# Patient Record
Sex: MALE | Race: WHITE | NOT HISPANIC OR LATINO | Employment: OTHER | URBAN - METROPOLITAN AREA
[De-identification: names, ages, dates, MRNs, and addresses within clinical notes are randomized per-mention and may not be internally consistent; named-entity substitution may affect disease eponyms.]

---

## 2019-11-19 ENCOUNTER — HOSPITAL ENCOUNTER (EMERGENCY)
Facility: HOSPITAL | Age: 64
Discharge: HOME | End: 2019-11-19
Attending: EMERGENCY MEDICINE
Payer: MEDICARE

## 2019-11-19 VITALS
HEART RATE: 77 BPM | RESPIRATION RATE: 18 BRPM | SYSTOLIC BLOOD PRESSURE: 148 MMHG | TEMPERATURE: 98 F | BODY MASS INDEX: 31.81 KG/M2 | WEIGHT: 240 LBS | OXYGEN SATURATION: 94 % | DIASTOLIC BLOOD PRESSURE: 71 MMHG | HEIGHT: 73 IN

## 2019-11-19 DIAGNOSIS — K56.609 SMALL BOWEL OBSTRUCTION: ICD-10-CM

## 2019-11-19 DIAGNOSIS — R10.9 ABDOMINAL PAIN, UNSPECIFIED ABDOMINAL LOCATION: Primary | ICD-10-CM

## 2019-11-19 DIAGNOSIS — B19.20 HEPATITIS C VIRUS INFECTION WITHOUT HEPATIC COMA, UNSPECIFIED CHRONICITY: ICD-10-CM

## 2019-11-19 DIAGNOSIS — K74.60 HEPATIC CIRRHOSIS, UNSPECIFIED HEPATIC CIRRHOSIS TYPE, UNSPECIFIED WHETHER ASCITES PRESENT: ICD-10-CM

## 2019-11-19 LAB
ALBUMIN SERPL BCP-MCNC: 3.8 G/DL (ref 3.5–5.2)
ALP SERPL-CCNC: 84 U/L (ref 55–135)
ALT SERPL W/O P-5'-P-CCNC: 26 U/L (ref 10–44)
ANION GAP SERPL CALC-SCNC: 11 MMOL/L (ref 8–16)
AST SERPL-CCNC: 31 U/L (ref 10–40)
BASOPHILS # BLD AUTO: 0.02 K/UL (ref 0–0.2)
BASOPHILS NFR BLD: 0.3 % (ref 0–1.9)
BILIRUB SERPL-MCNC: 1.5 MG/DL (ref 0.1–1)
BILIRUB UR QL STRIP: NEGATIVE
BNP SERPL-MCNC: 49 PG/ML (ref 0–99)
BUN SERPL-MCNC: 16 MG/DL (ref 8–23)
CALCIUM SERPL-MCNC: 8.6 MG/DL (ref 8.7–10.5)
CHLORIDE SERPL-SCNC: 103 MMOL/L (ref 95–110)
CLARITY UR: CLEAR
CO2 SERPL-SCNC: 26 MMOL/L (ref 23–29)
COLOR UR: YELLOW
CREAT SERPL-MCNC: 1 MG/DL (ref 0.5–1.4)
DIFFERENTIAL METHOD: ABNORMAL
EOSINOPHIL # BLD AUTO: 0.1 K/UL (ref 0–0.5)
EOSINOPHIL NFR BLD: 0.8 % (ref 0–8)
ERYTHROCYTE [DISTWIDTH] IN BLOOD BY AUTOMATED COUNT: 14.2 % (ref 11.5–14.5)
EST. GFR  (AFRICAN AMERICAN): >60 ML/MIN/1.73 M^2
EST. GFR  (NON AFRICAN AMERICAN): >60 ML/MIN/1.73 M^2
GLUCOSE SERPL-MCNC: 117 MG/DL (ref 70–110)
GLUCOSE UR QL STRIP: NEGATIVE
HCT VFR BLD AUTO: 41.9 % (ref 40–54)
HGB BLD-MCNC: 13.8 G/DL (ref 14–18)
HGB UR QL STRIP: ABNORMAL
IMM GRANULOCYTES # BLD AUTO: 0.03 K/UL (ref 0–0.04)
IMM GRANULOCYTES NFR BLD AUTO: 0.4 % (ref 0–0.5)
KETONES UR QL STRIP: NEGATIVE
LEUKOCYTE ESTERASE UR QL STRIP: NEGATIVE
LIPASE SERPL-CCNC: 38 U/L (ref 4–60)
LYMPHOCYTES # BLD AUTO: 0.3 K/UL (ref 1–4.8)
LYMPHOCYTES NFR BLD: 3.2 % (ref 18–48)
MCH RBC QN AUTO: 29.6 PG (ref 27–31)
MCHC RBC AUTO-ENTMCNC: 32.9 G/DL (ref 32–36)
MCV RBC AUTO: 90 FL (ref 82–98)
MONOCYTES # BLD AUTO: 0.4 K/UL (ref 0.3–1)
MONOCYTES NFR BLD: 4.7 % (ref 4–15)
NEUTROPHILS # BLD AUTO: 7.2 K/UL (ref 1.8–7.7)
NEUTROPHILS NFR BLD: 90.6 % (ref 38–73)
NITRITE UR QL STRIP: NEGATIVE
NRBC BLD-RTO: 0 /100 WBC
PH UR STRIP: 6 [PH] (ref 5–8)
PLATELET # BLD AUTO: 112 K/UL (ref 150–350)
PMV BLD AUTO: 11 FL (ref 9.2–12.9)
POTASSIUM SERPL-SCNC: 3.5 MMOL/L (ref 3.5–5.1)
PROT SERPL-MCNC: 7.3 G/DL (ref 6–8.4)
PROT UR QL STRIP: NEGATIVE
RBC # BLD AUTO: 4.67 M/UL (ref 4.6–6.2)
SODIUM SERPL-SCNC: 140 MMOL/L (ref 136–145)
SP GR UR STRIP: >=1.03 (ref 1–1.03)
TROPONIN I SERPL DL<=0.01 NG/ML-MCNC: <0.03 NG/ML (ref 0.02–0.04)
TROPONIN I SERPL DL<=0.01 NG/ML-MCNC: <0.03 NG/ML (ref 0.02–0.04)
URN SPEC COLLECT METH UR: ABNORMAL
UROBILINOGEN UR STRIP-ACNC: NEGATIVE EU/DL
WBC # BLD AUTO: 7.93 K/UL (ref 3.9–12.7)

## 2019-11-19 PROCEDURE — 25500020 PHARM REV CODE 255: Performed by: EMERGENCY MEDICINE

## 2019-11-19 PROCEDURE — 93005 ELECTROCARDIOGRAM TRACING: CPT

## 2019-11-19 PROCEDURE — 84484 ASSAY OF TROPONIN QUANT: CPT | Mod: 91

## 2019-11-19 PROCEDURE — 96361 HYDRATE IV INFUSION ADD-ON: CPT

## 2019-11-19 PROCEDURE — 99285 EMERGENCY DEPT VISIT HI MDM: CPT | Mod: 25

## 2019-11-19 PROCEDURE — 96375 TX/PRO/DX INJ NEW DRUG ADDON: CPT

## 2019-11-19 PROCEDURE — 85025 COMPLETE CBC W/AUTO DIFF WBC: CPT

## 2019-11-19 PROCEDURE — C9113 INJ PANTOPRAZOLE SODIUM, VIA: HCPCS | Performed by: EMERGENCY MEDICINE

## 2019-11-19 PROCEDURE — 96374 THER/PROPH/DIAG INJ IV PUSH: CPT

## 2019-11-19 PROCEDURE — 36415 COLL VENOUS BLD VENIPUNCTURE: CPT

## 2019-11-19 PROCEDURE — 83880 ASSAY OF NATRIURETIC PEPTIDE: CPT

## 2019-11-19 PROCEDURE — 63600175 PHARM REV CODE 636 W HCPCS: Performed by: EMERGENCY MEDICINE

## 2019-11-19 PROCEDURE — 83690 ASSAY OF LIPASE: CPT

## 2019-11-19 PROCEDURE — 80053 COMPREHEN METABOLIC PANEL: CPT

## 2019-11-19 PROCEDURE — 81003 URINALYSIS AUTO W/O SCOPE: CPT

## 2019-11-19 RX ORDER — PANTOPRAZOLE SODIUM 40 MG/10ML
40 INJECTION, POWDER, LYOPHILIZED, FOR SOLUTION INTRAVENOUS
Status: COMPLETED | OUTPATIENT
Start: 2019-11-19 | End: 2019-11-19

## 2019-11-19 RX ORDER — ONDANSETRON 2 MG/ML
4 INJECTION INTRAMUSCULAR; INTRAVENOUS
Status: COMPLETED | OUTPATIENT
Start: 2019-11-19 | End: 2019-11-19

## 2019-11-19 RX ORDER — HYDROMORPHONE HYDROCHLORIDE 1 MG/ML
0.5 INJECTION, SOLUTION INTRAMUSCULAR; INTRAVENOUS; SUBCUTANEOUS
Status: COMPLETED | OUTPATIENT
Start: 2019-11-19 | End: 2019-11-19

## 2019-11-19 RX ORDER — SODIUM CHLORIDE, SODIUM LACTATE, POTASSIUM CHLORIDE, CALCIUM CHLORIDE 600; 310; 30; 20 MG/100ML; MG/100ML; MG/100ML; MG/100ML
1000 INJECTION, SOLUTION INTRAVENOUS
Status: COMPLETED | OUTPATIENT
Start: 2019-11-19 | End: 2019-11-19

## 2019-11-19 RX ADMIN — SODIUM CHLORIDE, SODIUM LACTATE, POTASSIUM CHLORIDE, AND CALCIUM CHLORIDE 1000 ML: .6; .31; .03; .02 INJECTION, SOLUTION INTRAVENOUS at 09:11

## 2019-11-19 RX ADMIN — HYDROMORPHONE HYDROCHLORIDE 0.5 MG: 1 INJECTION, SOLUTION INTRAMUSCULAR; INTRAVENOUS; SUBCUTANEOUS at 09:11

## 2019-11-19 RX ADMIN — IOHEXOL 100 ML: 350 INJECTION, SOLUTION INTRAVENOUS at 10:11

## 2019-11-19 RX ADMIN — PANTOPRAZOLE SODIUM 40 MG: 40 INJECTION, POWDER, LYOPHILIZED, FOR SOLUTION INTRAVENOUS at 09:11

## 2019-11-19 RX ADMIN — ONDANSETRON 4 MG: 2 INJECTION INTRAMUSCULAR; INTRAVENOUS at 09:11

## 2019-11-19 NOTE — ED PROVIDER NOTES
Encounter Date: 11/19/2019       History     Chief Complaint   Patient presents with    Abdominal Pain     ONSET THIS AM @ 3    Vomiting     64-year-old male with history of hepatitis-C cirrhotic liver disease, ileocolonic Crohn's disease diagnosed at the age of 9 s/p multiple abdominal surgeries for SBO, recurring small-bowel obstructions.  never Patient followed by [facility].  Patient presents to the emergency department with complaint of nausea vomiting abdominal pain which began approximately 3 him last night.  Patient states has had numerous episodes of vomiting.  Last emesis was 1 hr prior to arrival.  Patient denied fever, no melena, no diarrhea.  No other constitutional symptoms. Patient states pain is diffuse in nature.  Patient is requesting if he has to be admitted he wishes transferred to [facility] for continuity of care.  States that he cannot drive himself to [facility] secondary to persistent pain and vomiting.        Review of patient's allergies indicates:  No Known Allergies  Past Medical History:   Diagnosis Date    Crohn disease     Hard of hearing     Hepatitis C     Hypertension      Past Surgical History:   Procedure Laterality Date    ABDOMINAL SURGERY      BOWEL RESECTION     No family history on file.  Social History     Tobacco Use    Smoking status: Never Smoker   Substance Use Topics    Alcohol use: Never     Frequency: Never    Drug use: Not on file     Review of Systems   Constitutional: Negative for fever.   HENT: Negative for sore throat.    Respiratory: Negative for shortness of breath.    Cardiovascular: Negative for chest pain.   Gastrointestinal: Positive for abdominal pain, nausea and vomiting. Negative for blood in stool and diarrhea.   Genitourinary: Negative for dysuria.   Musculoskeletal: Negative for back pain.   Skin: Negative for rash.   Neurological: Negative for weakness and light-headedness.   Hematological: Does not bruise/bleed easily.   Psychiatric/Behavioral: The patient  is not nervous/anxious.        Physical Exam     Initial Vitals [11/19/19 0823]   BP Pulse Resp Temp SpO2   (!) 149/76 85 18 98 °F (36.7 °C) 96 %      MAP       --         Physical Exam    Nursing note and vitals reviewed.  Constitutional: He appears well-developed and well-nourished. He appears ill.   HENT:   Head: Normocephalic and atraumatic.   Nose: Nose normal.   Mouth/Throat: Oropharynx is clear and moist.   Eyes: Conjunctivae and EOM are normal. Pupils are equal, round, and reactive to light. No scleral icterus.   Neck: Normal range of motion. Neck supple.   Cardiovascular: Normal rate, regular rhythm, normal heart sounds and intact distal pulses. Exam reveals no gallop and no friction rub.    No murmur heard.  Pulmonary/Chest: Breath sounds normal. No stridor. No respiratory distress.   Abdominal: Soft. Bowel sounds are normal. He exhibits distension. He exhibits no shifting dullness and no mass. There is hepatosplenomegaly. There is generalized tenderness. There is no rebound and no guarding. No hernia.   Musculoskeletal: Normal range of motion. He exhibits no edema.   Lymphadenopathy:     He has no cervical adenopathy.   Neurological: He is alert and oriented to person, place, and time. He has normal strength and normal reflexes. No cranial nerve deficit or sensory deficit. GCS score is 15. GCS eye subscore is 4. GCS verbal subscore is 5. GCS motor subscore is 6.   Skin: Skin is warm and dry. Capillary refill takes less than 2 seconds. No rash noted.   Psychiatric: He has a normal mood and affect. His behavior is normal. Judgment and thought content normal.         ED Course   Procedures  Labs Reviewed   CBC W/ AUTO DIFFERENTIAL   COMPREHENSIVE METABOLIC PANEL   URINALYSIS, REFLEX TO URINE CULTURE   LIPASE   TROPONIN I   B-TYPE NATRIURETIC PEPTIDE          Imaging Results          X-Ray Chest AP Portable (Final result)  Result time 11/19/19 08:45:21    Final result by Kolby Mathias MD (11/19/19  08:45:21)                 Impression:      1. No acute radiographic abnormalities.      Electronically signed by: Kolby Mathias MD  Date:    11/19/2019  Time:    08:45             Narrative:    EXAMINATION:  XR CHEST AP PORTABLE    CLINICAL HISTORY:  Chest pain    COMPARISON:  March 2018    FINDINGS:  Heart size is normal.  The mediastinum is unremarkable.  No infiltrates or effusions are identified.                                 Medical Decision Making:   Initial Assessment:   64-year-old male with history of hepatitis cirrhotic liver disease, Crohn's disease with recurrence palpitations.  Patient presents with nausea vomiting generalized abdominal pain since this morning.  Differential Diagnosis:   Colitis, bowel obstruction, Crohn's exacerbation, enteritis, viral syndrome  Clinical Tests:   Lab Tests: Ordered and Reviewed  Radiological Study: Ordered and Reviewed  Medical Tests: Ordered and Reviewed  ED Management:  Patient evaluated emergency department found to have subsequent small bowel obstruction.  The patient with noticeable treatment in care being done at [facility].  At this time patient was offered transfer to [facility] and had gained acceptance. However patient had a bowel movement in the emergency department status abdominal pain had decreased markedly.  Patient at this time decided not to be transferred or admitted for small-bowel obstruction.  Patient was given risks benefits of signing against medical advice.  Patient was advised to return to the emergency department if increased abdominal pain, vomiting, fever, symptoms persist or worsen.  Patient instructed to follow up with [facility] this week general surgery.  And primary care physician.                                 Clinical Impression:       ICD-10-CM ICD-9-CM   1. Abdominal pain, unspecified abdominal location R10.9 789.00   2. Small bowel obstruction K56.609 560.9   3. Hepatitis C virus infection without hepatic coma, unspecified chronicity B19.20  070.70   4. Hepatic cirrhosis, unspecified hepatic cirrhosis type, unspecified whether ascites present K74.60 571.5                             Arslan Craig MD  11/19/19 5332

## 2019-11-19 NOTE — ED NOTES
Severe abdominal pain reported since 200 am.  +  N/V PTA.  Abdomen obese.  Protruding area to the right of midline incisional type scar.  States old finding.  BS x 4 WNL.  Last BM 0600.  Reports HX of Crohns disease.

## 2021-02-13 ENCOUNTER — HOSPITAL ENCOUNTER (INPATIENT)
Facility: HOSPITAL | Age: 66
LOS: 1 days | Discharge: HOME | DRG: 683 | End: 2021-02-14
Attending: EMERGENCY MEDICINE | Admitting: STUDENT IN AN ORGANIZED HEALTH CARE EDUCATION/TRAINING PROGRAM
Payer: MEDICARE

## 2021-02-13 DIAGNOSIS — N17.0 ACUTE RENAL FAILURE WITH TUBULAR NECROSIS: ICD-10-CM

## 2021-02-13 DIAGNOSIS — E87.1 HYPONATREMIA: ICD-10-CM

## 2021-02-13 DIAGNOSIS — K52.9 GASTROENTERITIS: ICD-10-CM

## 2021-02-13 DIAGNOSIS — R19.7 VOMITING AND DIARRHEA: ICD-10-CM

## 2021-02-13 DIAGNOSIS — E86.0 DEHYDRATION: Primary | ICD-10-CM

## 2021-02-13 DIAGNOSIS — K56.600 PARTIAL INTESTINAL OBSTRUCTION, UNSPECIFIED CAUSE: ICD-10-CM

## 2021-02-13 DIAGNOSIS — E87.6 HYPOKALEMIA: ICD-10-CM

## 2021-02-13 DIAGNOSIS — R11.10 VOMITING AND DIARRHEA: ICD-10-CM

## 2021-02-13 DIAGNOSIS — N17.9 AKI (ACUTE KIDNEY INJURY): ICD-10-CM

## 2021-02-13 PROBLEM — A41.9 SEPSIS: Status: ACTIVE | Noted: 2021-02-13

## 2021-02-13 PROBLEM — K56.609 SMALL BOWEL OBSTRUCTION: Status: ACTIVE | Noted: 2021-02-13

## 2021-02-13 LAB
ALBUMIN SERPL BCP-MCNC: 4.2 G/DL (ref 3.5–5.2)
ALP SERPL-CCNC: 99 U/L (ref 55–135)
ALT SERPL W/O P-5'-P-CCNC: 21 U/L (ref 10–44)
ANION GAP SERPL CALC-SCNC: 22 MMOL/L (ref 8–16)
AST SERPL-CCNC: 26 U/L (ref 10–40)
BASOPHILS # BLD AUTO: 0.08 K/UL (ref 0–0.2)
BASOPHILS NFR BLD: 0.6 % (ref 0–1.9)
BILIRUB SERPL-MCNC: 2.7 MG/DL (ref 0.1–1)
BUN SERPL-MCNC: 19 MG/DL (ref 8–23)
CALCIUM SERPL-MCNC: 8.9 MG/DL (ref 8.7–10.5)
CHLORIDE SERPL-SCNC: 84 MMOL/L (ref 95–110)
CO2 SERPL-SCNC: 28 MMOL/L (ref 23–29)
CREAT SERPL-MCNC: 2.8 MG/DL (ref 0.5–1.4)
DIFFERENTIAL METHOD: ABNORMAL
EOSINOPHIL # BLD AUTO: 0 K/UL (ref 0–0.5)
EOSINOPHIL NFR BLD: 0.2 % (ref 0–8)
ERYTHROCYTE [DISTWIDTH] IN BLOOD BY AUTOMATED COUNT: 14.3 % (ref 11.5–14.5)
EST. GFR  (AFRICAN AMERICAN): 25.8 ML/MIN/1.73 M^2
EST. GFR  (NON AFRICAN AMERICAN): 22.3 ML/MIN/1.73 M^2
GLUCOSE SERPL-MCNC: 138 MG/DL (ref 70–110)
HCT VFR BLD AUTO: 46.2 % (ref 40–54)
HGB BLD-MCNC: 15.8 G/DL (ref 14–18)
IMM GRANULOCYTES # BLD AUTO: 0.06 K/UL (ref 0–0.04)
IMM GRANULOCYTES NFR BLD AUTO: 0.5 % (ref 0–0.5)
LACTATE SERPL-SCNC: 3.2 MMOL/L (ref 0.5–1.9)
LIPASE SERPL-CCNC: 21 U/L (ref 4–60)
LYMPHOCYTES # BLD AUTO: 0.9 K/UL (ref 1–4.8)
LYMPHOCYTES NFR BLD: 7.3 % (ref 18–48)
MAGNESIUM SERPL-MCNC: 1 MG/DL (ref 1.6–2.6)
MCH RBC QN AUTO: 28.8 PG (ref 27–31)
MCHC RBC AUTO-ENTMCNC: 34.2 G/DL (ref 32–36)
MCV RBC AUTO: 84 FL (ref 82–98)
MONOCYTES # BLD AUTO: 0.7 K/UL (ref 0.3–1)
MONOCYTES NFR BLD: 5.7 % (ref 4–15)
NEUTROPHILS # BLD AUTO: 11 K/UL (ref 1.8–7.7)
NEUTROPHILS NFR BLD: 85.7 % (ref 38–73)
NRBC BLD-RTO: 0 /100 WBC
OB PNL STL: POSITIVE
PLATELET # BLD AUTO: 345 K/UL (ref 150–350)
PMV BLD AUTO: 11.6 FL (ref 9.2–12.9)
POTASSIUM SERPL-SCNC: 2.5 MMOL/L (ref 3.5–5.1)
PROT SERPL-MCNC: 9 G/DL (ref 6–8.4)
RBC # BLD AUTO: 5.49 M/UL (ref 4.6–6.2)
SARS-COV-2 RDRP RESP QL NAA+PROBE: NEGATIVE
SODIUM SERPL-SCNC: 134 MMOL/L (ref 136–145)
WBC # BLD AUTO: 12.81 K/UL (ref 3.9–12.7)
WBC #/AREA STL HPF: NORMAL /[HPF]

## 2021-02-13 PROCEDURE — 12000002 HC ACUTE/MED SURGE SEMI-PRIVATE ROOM

## 2021-02-13 PROCEDURE — 25000003 PHARM REV CODE 250: Performed by: EMERGENCY MEDICINE

## 2021-02-13 PROCEDURE — 87045 FECES CULTURE AEROBIC BACT: CPT

## 2021-02-13 PROCEDURE — 85025 COMPLETE CBC W/AUTO DIFF WBC: CPT

## 2021-02-13 PROCEDURE — 89055 LEUKOCYTE ASSESSMENT FECAL: CPT

## 2021-02-13 PROCEDURE — C9113 INJ PANTOPRAZOLE SODIUM, VIA: HCPCS | Performed by: EMERGENCY MEDICINE

## 2021-02-13 PROCEDURE — U0002 COVID-19 LAB TEST NON-CDC: HCPCS

## 2021-02-13 PROCEDURE — 96360 HYDRATION IV INFUSION INIT: CPT

## 2021-02-13 PROCEDURE — S0030 INJECTION, METRONIDAZOLE: HCPCS | Performed by: EMERGENCY MEDICINE

## 2021-02-13 PROCEDURE — 82272 OCCULT BLD FECES 1-3 TESTS: CPT

## 2021-02-13 PROCEDURE — 87209 SMEAR COMPLEX STAIN: CPT

## 2021-02-13 PROCEDURE — 80053 COMPREHEN METABOLIC PANEL: CPT

## 2021-02-13 PROCEDURE — 87040 BLOOD CULTURE FOR BACTERIA: CPT

## 2021-02-13 PROCEDURE — 99285 EMERGENCY DEPT VISIT HI MDM: CPT | Mod: 25

## 2021-02-13 PROCEDURE — 83735 ASSAY OF MAGNESIUM: CPT

## 2021-02-13 PROCEDURE — 83605 ASSAY OF LACTIC ACID: CPT

## 2021-02-13 PROCEDURE — 87015 SPECIMEN INFECT AGNT CONCNTJ: CPT

## 2021-02-13 PROCEDURE — 63600175 PHARM REV CODE 636 W HCPCS: Performed by: NURSE PRACTITIONER

## 2021-02-13 PROCEDURE — 87046 STOOL CULTR AEROBIC BACT EA: CPT | Mod: 59

## 2021-02-13 PROCEDURE — 36415 COLL VENOUS BLD VENIPUNCTURE: CPT

## 2021-02-13 PROCEDURE — 96374 THER/PROPH/DIAG INJ IV PUSH: CPT

## 2021-02-13 PROCEDURE — 63600175 PHARM REV CODE 636 W HCPCS: Performed by: EMERGENCY MEDICINE

## 2021-02-13 PROCEDURE — 96375 TX/PRO/DX INJ NEW DRUG ADDON: CPT

## 2021-02-13 PROCEDURE — 83690 ASSAY OF LIPASE: CPT

## 2021-02-13 RX ORDER — MAGNESIUM SULFATE HEPTAHYDRATE 40 MG/ML
2 INJECTION, SOLUTION INTRAVENOUS
Status: DISCONTINUED | OUTPATIENT
Start: 2021-02-13 | End: 2021-02-15 | Stop reason: HOSPADM

## 2021-02-13 RX ORDER — POTASSIUM CHLORIDE 20 MEQ/1
20 TABLET, EXTENDED RELEASE ORAL
Status: DISCONTINUED | OUTPATIENT
Start: 2021-02-13 | End: 2021-02-15 | Stop reason: HOSPADM

## 2021-02-13 RX ORDER — ERGOCALCIFEROL 1.25 MG/1
50000 CAPSULE ORAL
COMMUNITY

## 2021-02-13 RX ORDER — CALCIUM CHLORIDE IN 0.9 % NACL 1 G/100 ML
1 INTRAVENOUS SOLUTION, PIGGYBACK (ML) INTRAVENOUS
Status: DISCONTINUED | OUTPATIENT
Start: 2021-02-13 | End: 2021-02-15 | Stop reason: HOSPADM

## 2021-02-13 RX ORDER — AMLODIPINE BESYLATE 5 MG/1
10 TABLET ORAL DAILY
Status: DISCONTINUED | OUTPATIENT
Start: 2021-02-14 | End: 2021-02-15 | Stop reason: HOSPADM

## 2021-02-13 RX ORDER — POTASSIUM CHLORIDE 7.45 MG/ML
40 INJECTION INTRAVENOUS
Status: DISCONTINUED | OUTPATIENT
Start: 2021-02-13 | End: 2021-02-15 | Stop reason: HOSPADM

## 2021-02-13 RX ORDER — MAGNESIUM SULFATE 1 G/100ML
1 INJECTION INTRAVENOUS
Status: DISCONTINUED | OUTPATIENT
Start: 2021-02-13 | End: 2021-02-15 | Stop reason: HOSPADM

## 2021-02-13 RX ORDER — ONDANSETRON 2 MG/ML
4 INJECTION INTRAMUSCULAR; INTRAVENOUS
Status: COMPLETED | OUTPATIENT
Start: 2021-02-13 | End: 2021-02-13

## 2021-02-13 RX ORDER — ONDANSETRON 2 MG/ML
4 INJECTION INTRAMUSCULAR; INTRAVENOUS EVERY 8 HOURS PRN
Status: DISCONTINUED | OUTPATIENT
Start: 2021-02-13 | End: 2021-02-15 | Stop reason: HOSPADM

## 2021-02-13 RX ORDER — SODIUM CHLORIDE AND POTASSIUM CHLORIDE 150; 900 MG/100ML; MG/100ML
INJECTION, SOLUTION INTRAVENOUS
Status: COMPLETED | OUTPATIENT
Start: 2021-02-13 | End: 2021-02-13

## 2021-02-13 RX ORDER — PANTOPRAZOLE SODIUM 40 MG/10ML
40 INJECTION, POWDER, LYOPHILIZED, FOR SOLUTION INTRAVENOUS
Status: COMPLETED | OUTPATIENT
Start: 2021-02-13 | End: 2021-02-13

## 2021-02-13 RX ORDER — ACETAMINOPHEN 325 MG/1
650 TABLET ORAL EVERY 4 HOURS PRN
Status: DISCONTINUED | OUTPATIENT
Start: 2021-02-13 | End: 2021-02-15 | Stop reason: HOSPADM

## 2021-02-13 RX ORDER — LEVOFLOXACIN 5 MG/ML
500 INJECTION, SOLUTION INTRAVENOUS
Status: DISCONTINUED | OUTPATIENT
Start: 2021-02-14 | End: 2021-02-15 | Stop reason: HOSPADM

## 2021-02-13 RX ORDER — AMLODIPINE BESYLATE 10 MG/1
10 TABLET ORAL DAILY
COMMUNITY

## 2021-02-13 RX ORDER — MAGNESIUM SULFATE HEPTAHYDRATE 40 MG/ML
2 INJECTION, SOLUTION INTRAVENOUS ONCE
Status: COMPLETED | OUTPATIENT
Start: 2021-02-14 | End: 2021-02-14

## 2021-02-13 RX ORDER — LANOLIN ALCOHOL/MO/W.PET/CERES
800 CREAM (GRAM) TOPICAL
Status: DISCONTINUED | OUTPATIENT
Start: 2021-02-13 | End: 2021-02-15 | Stop reason: HOSPADM

## 2021-02-13 RX ORDER — MAGNESIUM SULFATE HEPTAHYDRATE 40 MG/ML
4 INJECTION, SOLUTION INTRAVENOUS
Status: DISCONTINUED | OUTPATIENT
Start: 2021-02-13 | End: 2021-02-15 | Stop reason: HOSPADM

## 2021-02-13 RX ORDER — MORPHINE SULFATE 4 MG/ML
4 INJECTION, SOLUTION INTRAMUSCULAR; INTRAVENOUS
Status: COMPLETED | OUTPATIENT
Start: 2021-02-13 | End: 2021-02-13

## 2021-02-13 RX ORDER — SODIUM CHLORIDE 0.9 % (FLUSH) 0.9 %
10 SYRINGE (ML) INJECTION
Status: DISCONTINUED | OUTPATIENT
Start: 2021-02-13 | End: 2021-02-15 | Stop reason: HOSPADM

## 2021-02-13 RX ORDER — POTASSIUM CHLORIDE 20 MEQ/1
40 TABLET, EXTENDED RELEASE ORAL
Status: DISCONTINUED | OUTPATIENT
Start: 2021-02-13 | End: 2021-02-15 | Stop reason: HOSPADM

## 2021-02-13 RX ORDER — POTASSIUM CHLORIDE 7.45 MG/ML
20 INJECTION INTRAVENOUS
Status: DISCONTINUED | OUTPATIENT
Start: 2021-02-13 | End: 2021-02-15 | Stop reason: HOSPADM

## 2021-02-13 RX ORDER — ACETAMINOPHEN 325 MG/1
650 TABLET ORAL EVERY 8 HOURS PRN
Status: DISCONTINUED | OUTPATIENT
Start: 2021-02-13 | End: 2021-02-15 | Stop reason: HOSPADM

## 2021-02-13 RX ORDER — SODIUM CHLORIDE AND POTASSIUM CHLORIDE 150; 900 MG/100ML; MG/100ML
INJECTION, SOLUTION INTRAVENOUS CONTINUOUS
Status: DISCONTINUED | OUTPATIENT
Start: 2021-02-14 | End: 2021-02-14 | Stop reason: HOSPADM

## 2021-02-13 RX ORDER — LEVOFLOXACIN 5 MG/ML
500 INJECTION, SOLUTION INTRAVENOUS
Status: COMPLETED | OUTPATIENT
Start: 2021-02-13 | End: 2021-02-13

## 2021-02-13 RX ORDER — METRONIDAZOLE 500 MG/100ML
500 INJECTION, SOLUTION INTRAVENOUS
Status: DISCONTINUED | OUTPATIENT
Start: 2021-02-15 | End: 2021-02-15 | Stop reason: HOSPADM

## 2021-02-13 RX ORDER — METRONIDAZOLE 500 MG/100ML
500 INJECTION, SOLUTION INTRAVENOUS
Status: COMPLETED | OUTPATIENT
Start: 2021-02-13 | End: 2021-02-13

## 2021-02-13 RX ADMIN — LEVOFLOXACIN 500 MG: 500 INJECTION, SOLUTION INTRAVENOUS at 10:02

## 2021-02-13 RX ADMIN — PANTOPRAZOLE SODIUM 40 MG: 40 INJECTION, POWDER, FOR SOLUTION INTRAVENOUS at 08:02

## 2021-02-13 RX ADMIN — SODIUM CHLORIDE AND POTASSIUM CHLORIDE: .9; .15 SOLUTION INTRAVENOUS at 09:02

## 2021-02-13 RX ADMIN — SODIUM CHLORIDE, SODIUM LACTATE, POTASSIUM CHLORIDE, AND CALCIUM CHLORIDE 1000 ML: .6; .31; .03; .02 INJECTION, SOLUTION INTRAVENOUS at 08:02

## 2021-02-13 RX ADMIN — SODIUM CHLORIDE 1000 ML: 0.9 INJECTION, SOLUTION INTRAVENOUS at 09:02

## 2021-02-13 RX ADMIN — ONDANSETRON 4 MG: 2 INJECTION INTRAMUSCULAR; INTRAVENOUS at 08:02

## 2021-02-13 RX ADMIN — MORPHINE SULFATE 4 MG: 4 INJECTION INTRAVENOUS at 08:02

## 2021-02-13 RX ADMIN — METRONIDAZOLE 500 MG: 500 INJECTION, SOLUTION INTRAVENOUS at 09:02

## 2021-02-13 RX ADMIN — SODIUM CHLORIDE AND POTASSIUM CHLORIDE: .9; .15 SOLUTION INTRAVENOUS at 11:02

## 2021-02-14 VITALS
DIASTOLIC BLOOD PRESSURE: 77 MMHG | OXYGEN SATURATION: 94 % | HEIGHT: 73 IN | TEMPERATURE: 97 F | RESPIRATION RATE: 18 BRPM | HEART RATE: 74 BPM | BODY MASS INDEX: 31.14 KG/M2 | SYSTOLIC BLOOD PRESSURE: 137 MMHG | WEIGHT: 235 LBS

## 2021-02-14 PROBLEM — K56.609 SMALL BOWEL OBSTRUCTION: Status: RESOLVED | Noted: 2021-02-13 | Resolved: 2021-02-14

## 2021-02-14 PROBLEM — K52.9 GASTROENTERITIS: Status: RESOLVED | Noted: 2021-02-13 | Resolved: 2021-02-14

## 2021-02-14 PROBLEM — A41.9 SEPSIS: Status: RESOLVED | Noted: 2021-02-13 | Resolved: 2021-02-14

## 2021-02-14 PROBLEM — N17.9 AKI (ACUTE KIDNEY INJURY): Status: RESOLVED | Noted: 2021-02-13 | Resolved: 2021-02-14

## 2021-02-14 LAB
ANION GAP SERPL CALC-SCNC: 14 MMOL/L (ref 8–16)
ANION GAP SERPL CALC-SCNC: 15 MMOL/L (ref 8–16)
BASOPHILS # BLD AUTO: 0.02 K/UL (ref 0–0.2)
BASOPHILS NFR BLD: 0.3 % (ref 0–1.9)
BUN SERPL-MCNC: 24 MG/DL (ref 8–23)
BUN SERPL-MCNC: 25 MG/DL (ref 8–23)
CALCIUM SERPL-MCNC: 7.4 MG/DL (ref 8.7–10.5)
CALCIUM SERPL-MCNC: 7.5 MG/DL (ref 8.7–10.5)
CHLORIDE SERPL-SCNC: 93 MMOL/L (ref 95–110)
CHLORIDE SERPL-SCNC: 93 MMOL/L (ref 95–110)
CO2 SERPL-SCNC: 26 MMOL/L (ref 23–29)
CO2 SERPL-SCNC: 28 MMOL/L (ref 23–29)
CREAT SERPL-MCNC: 1.7 MG/DL (ref 0.5–1.4)
CREAT SERPL-MCNC: 2.2 MG/DL (ref 0.5–1.4)
DIFFERENTIAL METHOD: ABNORMAL
EOSINOPHIL # BLD AUTO: 0 K/UL (ref 0–0.5)
EOSINOPHIL NFR BLD: 0 % (ref 0–8)
ERYTHROCYTE [DISTWIDTH] IN BLOOD BY AUTOMATED COUNT: 14.2 % (ref 11.5–14.5)
EST. GFR  (AFRICAN AMERICAN): 34.6 ML/MIN/1.73 M^2
EST. GFR  (AFRICAN AMERICAN): 47.2 ML/MIN/1.73 M^2
EST. GFR  (NON AFRICAN AMERICAN): 29.9 ML/MIN/1.73 M^2
EST. GFR  (NON AFRICAN AMERICAN): 40.8 ML/MIN/1.73 M^2
GLUCOSE SERPL-MCNC: 86 MG/DL (ref 70–110)
GLUCOSE SERPL-MCNC: 93 MG/DL (ref 70–110)
HCT VFR BLD AUTO: 35.8 % (ref 40–54)
HGB BLD-MCNC: 11.7 G/DL (ref 14–18)
IMM GRANULOCYTES # BLD AUTO: 0.02 K/UL (ref 0–0.04)
IMM GRANULOCYTES NFR BLD AUTO: 0.3 % (ref 0–0.5)
LACTATE SERPL-SCNC: 1.2 MMOL/L (ref 0.5–1.9)
LYMPHOCYTES # BLD AUTO: 0.6 K/UL (ref 1–4.8)
LYMPHOCYTES NFR BLD: 10 % (ref 18–48)
MAGNESIUM SERPL-MCNC: 0.8 MG/DL (ref 1.6–2.6)
MAGNESIUM SERPL-MCNC: 1.4 MG/DL (ref 1.6–2.6)
MCH RBC QN AUTO: 28.1 PG (ref 27–31)
MCHC RBC AUTO-ENTMCNC: 32.7 G/DL (ref 32–36)
MCV RBC AUTO: 86 FL (ref 82–98)
MONOCYTES # BLD AUTO: 0.5 K/UL (ref 0.3–1)
MONOCYTES NFR BLD: 8.3 % (ref 4–15)
NEUTROPHILS # BLD AUTO: 4.9 K/UL (ref 1.8–7.7)
NEUTROPHILS NFR BLD: 81.1 % (ref 38–73)
NRBC BLD-RTO: 0 /100 WBC
PLATELET # BLD AUTO: 161 K/UL (ref 150–350)
PMV BLD AUTO: 11.4 FL (ref 9.2–12.9)
POTASSIUM SERPL-SCNC: 3 MMOL/L (ref 3.5–5.1)
POTASSIUM SERPL-SCNC: 3.3 MMOL/L (ref 3.5–5.1)
RBC # BLD AUTO: 4.16 M/UL (ref 4.6–6.2)
SODIUM SERPL-SCNC: 133 MMOL/L (ref 136–145)
SODIUM SERPL-SCNC: 136 MMOL/L (ref 136–145)
WBC # BLD AUTO: 6.03 K/UL (ref 3.9–12.7)

## 2021-02-14 PROCEDURE — 63600175 PHARM REV CODE 636 W HCPCS: Performed by: NURSE PRACTITIONER

## 2021-02-14 PROCEDURE — 12000002 HC ACUTE/MED SURGE SEMI-PRIVATE ROOM

## 2021-02-14 PROCEDURE — 36415 COLL VENOUS BLD VENIPUNCTURE: CPT

## 2021-02-14 PROCEDURE — 99223 1ST HOSP IP/OBS HIGH 75: CPT | Mod: ,,, | Performed by: STUDENT IN AN ORGANIZED HEALTH CARE EDUCATION/TRAINING PROGRAM

## 2021-02-14 PROCEDURE — 83735 ASSAY OF MAGNESIUM: CPT

## 2021-02-14 PROCEDURE — 83735 ASSAY OF MAGNESIUM: CPT | Mod: 91

## 2021-02-14 PROCEDURE — 63600175 PHARM REV CODE 636 W HCPCS: Performed by: INTERNAL MEDICINE

## 2021-02-14 PROCEDURE — 99223 PR INITIAL HOSPITAL CARE,LEVL III: ICD-10-PCS | Mod: ,,, | Performed by: STUDENT IN AN ORGANIZED HEALTH CARE EDUCATION/TRAINING PROGRAM

## 2021-02-14 PROCEDURE — 80048 BASIC METABOLIC PNL TOTAL CA: CPT | Mod: 91

## 2021-02-14 PROCEDURE — 85025 COMPLETE CBC W/AUTO DIFF WBC: CPT

## 2021-02-14 PROCEDURE — 80048 BASIC METABOLIC PNL TOTAL CA: CPT

## 2021-02-14 PROCEDURE — 83605 ASSAY OF LACTIC ACID: CPT

## 2021-02-14 PROCEDURE — 25000003 PHARM REV CODE 250: Performed by: INTERNAL MEDICINE

## 2021-02-14 RX ORDER — MAGNESIUM SULFATE HEPTAHYDRATE 40 MG/ML
2 INJECTION, SOLUTION INTRAVENOUS ONCE
Status: COMPLETED | OUTPATIENT
Start: 2021-02-14 | End: 2021-02-14

## 2021-02-14 RX ORDER — TRAMADOL HYDROCHLORIDE 50 MG/1
50 TABLET ORAL EVERY 6 HOURS PRN
Status: DISCONTINUED | OUTPATIENT
Start: 2021-02-14 | End: 2021-02-15 | Stop reason: HOSPADM

## 2021-02-14 RX ORDER — POTASSIUM CHLORIDE 20 MEQ/1
40 TABLET, EXTENDED RELEASE ORAL
Status: COMPLETED | OUTPATIENT
Start: 2021-02-14 | End: 2021-02-14

## 2021-02-14 RX ORDER — DICYCLOMINE HYDROCHLORIDE 10 MG/1
10 CAPSULE ORAL 4 TIMES DAILY PRN
Status: DISCONTINUED | OUTPATIENT
Start: 2021-02-14 | End: 2021-02-15 | Stop reason: HOSPADM

## 2021-02-14 RX ORDER — POTASSIUM CHLORIDE 20 MEQ/1
20 TABLET, EXTENDED RELEASE ORAL 2 TIMES DAILY
Qty: 14 TABLET | Refills: 0 | Status: SHIPPED | OUTPATIENT
Start: 2021-02-14

## 2021-02-14 RX ORDER — LANOLIN ALCOHOL/MO/W.PET/CERES
400 CREAM (GRAM) TOPICAL ONCE
Status: COMPLETED | OUTPATIENT
Start: 2021-02-14 | End: 2021-02-14

## 2021-02-14 RX ORDER — LANOLIN ALCOHOL/MO/W.PET/CERES
400 CREAM (GRAM) TOPICAL DAILY
Qty: 7 TABLET | Refills: 0 | Status: SHIPPED | OUTPATIENT
Start: 2021-02-14

## 2021-02-14 RX ORDER — PROMETHAZINE HYDROCHLORIDE 25 MG/1
25 TABLET ORAL EVERY 6 HOURS PRN
Qty: 30 TABLET | Refills: 1 | Status: SHIPPED | OUTPATIENT
Start: 2021-02-14

## 2021-02-14 RX ADMIN — POTASSIUM CHLORIDE 40 MEQ: 20 TABLET, EXTENDED RELEASE ORAL at 04:02

## 2021-02-14 RX ADMIN — POTASSIUM CHLORIDE 40 MEQ: 7.46 INJECTION, SOLUTION INTRAVENOUS at 09:02

## 2021-02-14 RX ADMIN — SODIUM CHLORIDE AND POTASSIUM CHLORIDE: .9; .15 SOLUTION INTRAVENOUS at 02:02

## 2021-02-14 RX ADMIN — DICYCLOMINE HYDROCHLORIDE 10 MG: 10 CAPSULE ORAL at 12:02

## 2021-02-14 RX ADMIN — MAGNESIUM SULFATE 2 G: 2 INJECTION INTRAVENOUS at 02:02

## 2021-02-14 RX ADMIN — POTASSIUM CHLORIDE 40 MEQ: 20 TABLET, EXTENDED RELEASE ORAL at 03:02

## 2021-02-14 RX ADMIN — MAGNESIUM OXIDE 400 MG: 400 TABLET ORAL at 03:02

## 2021-02-14 RX ADMIN — MAGNESIUM SULFATE 2 G: 2 INJECTION INTRAVENOUS at 03:02

## 2021-02-15 LAB
STOOL CULTURE: NORMAL
STOOL CULTURE: NORMAL

## 2021-02-18 LAB
BACTERIA BLD CULT: NORMAL
BACTERIA BLD CULT: NORMAL
O+P STL TRI STN: NORMAL

## 2025-01-17 ENCOUNTER — HOSPITAL ENCOUNTER (INPATIENT)
Facility: HOSPITAL | Age: 70
LOS: 2 days | Discharge: HOME | DRG: 389 | End: 2025-01-19
Attending: STUDENT IN AN ORGANIZED HEALTH CARE EDUCATION/TRAINING PROGRAM
Payer: MEDICARE

## 2025-01-17 DIAGNOSIS — R10.9 ABDOMINAL PAIN, UNSPECIFIED ABDOMINAL LOCATION: Primary | ICD-10-CM

## 2025-01-17 DIAGNOSIS — R07.9 CHEST PAIN: ICD-10-CM

## 2025-01-17 DIAGNOSIS — N30.90 CYSTITIS: ICD-10-CM

## 2025-01-17 DIAGNOSIS — R91.1 PULMONARY NODULE: ICD-10-CM

## 2025-01-17 DIAGNOSIS — K50.919 CROHN'S DISEASE WITH COMPLICATION, UNSPECIFIED GASTROINTESTINAL TRACT LOCATION: ICD-10-CM

## 2025-01-17 DIAGNOSIS — K56.600 PARTIAL SMALL BOWEL OBSTRUCTION: ICD-10-CM

## 2025-01-17 LAB
ALBUMIN SERPL BCP-MCNC: 3.3 G/DL (ref 3.5–5.2)
ALP SERPL-CCNC: 82 U/L (ref 55–135)
ALT SERPL W/O P-5'-P-CCNC: 12 U/L (ref 10–44)
ANION GAP SERPL CALC-SCNC: 5 MMOL/L (ref 8–16)
AST SERPL-CCNC: 24 U/L (ref 10–40)
BACTERIA #/AREA URNS HPF: ABNORMAL /HPF
BASOPHILS # BLD AUTO: 0.01 K/UL (ref 0–0.2)
BASOPHILS NFR BLD: 0.2 % (ref 0–1.9)
BILIRUB SERPL-MCNC: 1.4 MG/DL (ref 0.1–1)
BILIRUB UR QL STRIP: ABNORMAL
BNP SERPL-MCNC: 80 PG/ML (ref 0–99)
BUN SERPL-MCNC: 10 MG/DL (ref 8–23)
CALCIUM SERPL-MCNC: 8.3 MG/DL (ref 8.7–10.5)
CHLORIDE SERPL-SCNC: 104 MMOL/L (ref 95–110)
CLARITY UR: ABNORMAL
CO2 SERPL-SCNC: 30 MMOL/L (ref 23–29)
COLOR UR: YELLOW
CREAT SERPL-MCNC: 0.9 MG/DL (ref 0.5–1.4)
CRP SERPL-MCNC: 0.8 MG/DL
DIFFERENTIAL METHOD BLD: ABNORMAL
EOSINOPHIL # BLD AUTO: 0.1 K/UL (ref 0–0.5)
EOSINOPHIL NFR BLD: 1.3 % (ref 0–8)
ERYTHROCYTE [DISTWIDTH] IN BLOOD BY AUTOMATED COUNT: 15 % (ref 11.5–14.5)
EST. GFR  (NO RACE VARIABLE): >60 ML/MIN/1.73 M^2
GLUCOSE SERPL-MCNC: 102 MG/DL (ref 70–110)
GLUCOSE UR QL STRIP: NEGATIVE
HCT VFR BLD AUTO: 32 % (ref 40–54)
HGB BLD-MCNC: 10 G/DL (ref 14–18)
HGB UR QL STRIP: ABNORMAL
IMM GRANULOCYTES # BLD AUTO: 0.01 K/UL (ref 0–0.04)
IMM GRANULOCYTES NFR BLD AUTO: 0.2 % (ref 0–0.5)
INR PPP: 1.1 (ref 0.8–1.2)
KETONES UR QL STRIP: NEGATIVE
LEUKOCYTE ESTERASE UR QL STRIP: ABNORMAL
LIPASE SERPL-CCNC: 40 U/L (ref 4–60)
LYMPHOCYTES # BLD AUTO: 0.2 K/UL (ref 1–4.8)
LYMPHOCYTES NFR BLD: 3.9 % (ref 18–48)
MCH RBC QN AUTO: 27.5 PG (ref 27–31)
MCHC RBC AUTO-ENTMCNC: 31.3 G/DL (ref 32–36)
MCV RBC AUTO: 88 FL (ref 82–98)
MICROSCOPIC COMMENT: ABNORMAL
MONOCYTES # BLD AUTO: 0.3 K/UL (ref 0.3–1)
MONOCYTES NFR BLD: 7.1 % (ref 4–15)
NEUTROPHILS # BLD AUTO: 4.1 K/UL (ref 1.8–7.7)
NEUTROPHILS NFR BLD: 87.3 % (ref 38–73)
NITRITE UR QL STRIP: POSITIVE
NRBC BLD-RTO: 0 /100 WBC
PH UR STRIP: 7 [PH] (ref 5–8)
PLATELET # BLD AUTO: 119 K/UL (ref 150–450)
PMV BLD AUTO: 12.4 FL (ref 9.2–12.9)
POTASSIUM SERPL-SCNC: 3.8 MMOL/L (ref 3.5–5.1)
PROT SERPL-MCNC: 6.4 G/DL (ref 6–8.4)
PROT UR QL STRIP: ABNORMAL
PROTHROMBIN TIME: 12.7 SEC (ref 9–12.5)
RBC # BLD AUTO: 3.64 M/UL (ref 4.6–6.2)
RBC #/AREA URNS HPF: 9 /HPF (ref 0–4)
SODIUM SERPL-SCNC: 139 MMOL/L (ref 136–145)
SP GR UR STRIP: 1.02 (ref 1–1.03)
TROPONIN I SERPL HS-MCNC: 12.9 PG/ML (ref 0–14.9)
UNIDENT CRYS URNS QL MICRO: 2
URN SPEC COLLECT METH UR: ABNORMAL
UROBILINOGEN UR STRIP-ACNC: ABNORMAL EU/DL
WBC # BLD AUTO: 4.65 K/UL (ref 3.9–12.7)
WBC #/AREA URNS HPF: >100 /HPF (ref 0–5)

## 2025-01-17 PROCEDURE — 25000003 PHARM REV CODE 250

## 2025-01-17 PROCEDURE — 86140 C-REACTIVE PROTEIN: CPT

## 2025-01-17 PROCEDURE — 83690 ASSAY OF LIPASE: CPT

## 2025-01-17 PROCEDURE — 63600175 PHARM REV CODE 636 W HCPCS

## 2025-01-17 PROCEDURE — 83880 ASSAY OF NATRIURETIC PEPTIDE: CPT

## 2025-01-17 PROCEDURE — 99285 EMERGENCY DEPT VISIT HI MDM: CPT | Mod: 25

## 2025-01-17 PROCEDURE — 85610 PROTHROMBIN TIME: CPT | Performed by: STUDENT IN AN ORGANIZED HEALTH CARE EDUCATION/TRAINING PROGRAM

## 2025-01-17 PROCEDURE — 96375 TX/PRO/DX INJ NEW DRUG ADDON: CPT

## 2025-01-17 PROCEDURE — 87086 URINE CULTURE/COLONY COUNT: CPT

## 2025-01-17 PROCEDURE — 12000002 HC ACUTE/MED SURGE SEMI-PRIVATE ROOM

## 2025-01-17 PROCEDURE — 85025 COMPLETE CBC W/AUTO DIFF WBC: CPT

## 2025-01-17 PROCEDURE — 87077 CULTURE AEROBIC IDENTIFY: CPT

## 2025-01-17 PROCEDURE — 96374 THER/PROPH/DIAG INJ IV PUSH: CPT

## 2025-01-17 PROCEDURE — 87147 CULTURE TYPE IMMUNOLOGIC: CPT

## 2025-01-17 PROCEDURE — 84484 ASSAY OF TROPONIN QUANT: CPT

## 2025-01-17 PROCEDURE — 25500020 PHARM REV CODE 255: Performed by: STUDENT IN AN ORGANIZED HEALTH CARE EDUCATION/TRAINING PROGRAM

## 2025-01-17 PROCEDURE — 87186 SC STD MICRODIL/AGAR DIL: CPT

## 2025-01-17 PROCEDURE — 81001 URINALYSIS AUTO W/SCOPE: CPT

## 2025-01-17 PROCEDURE — 80053 COMPREHEN METABOLIC PANEL: CPT

## 2025-01-17 RX ORDER — IBUPROFEN 200 MG
16 TABLET ORAL
Status: DISCONTINUED | OUTPATIENT
Start: 2025-01-17 | End: 2025-01-19 | Stop reason: HOSPADM

## 2025-01-17 RX ORDER — CEFTRIAXONE 1 G/1
1 INJECTION, POWDER, FOR SOLUTION INTRAMUSCULAR; INTRAVENOUS
Status: DISCONTINUED | OUTPATIENT
Start: 2025-01-18 | End: 2025-01-19 | Stop reason: HOSPADM

## 2025-01-17 RX ORDER — SODIUM,POTASSIUM PHOSPHATES 280-250MG
2 POWDER IN PACKET (EA) ORAL
Status: DISCONTINUED | OUTPATIENT
Start: 2025-01-17 | End: 2025-01-19 | Stop reason: HOSPADM

## 2025-01-17 RX ORDER — FAMOTIDINE 10 MG/ML
20 INJECTION INTRAVENOUS
Status: COMPLETED | OUTPATIENT
Start: 2025-01-17 | End: 2025-01-17

## 2025-01-17 RX ORDER — GLUCAGON 1 MG
1 KIT INJECTION
Status: DISCONTINUED | OUTPATIENT
Start: 2025-01-17 | End: 2025-01-19 | Stop reason: HOSPADM

## 2025-01-17 RX ORDER — ENOXAPARIN SODIUM 100 MG/ML
40 INJECTION SUBCUTANEOUS EVERY 24 HOURS
Status: DISCONTINUED | OUTPATIENT
Start: 2025-01-17 | End: 2025-01-19 | Stop reason: HOSPADM

## 2025-01-17 RX ORDER — ALUMINUM HYDROXIDE, MAGNESIUM HYDROXIDE, AND SIMETHICONE 1200; 120; 1200 MG/30ML; MG/30ML; MG/30ML
30 SUSPENSION ORAL 4 TIMES DAILY PRN
Status: DISCONTINUED | OUTPATIENT
Start: 2025-01-17 | End: 2025-01-19 | Stop reason: HOSPADM

## 2025-01-17 RX ORDER — LANOLIN ALCOHOL/MO/W.PET/CERES
800 CREAM (GRAM) TOPICAL
Status: DISCONTINUED | OUTPATIENT
Start: 2025-01-17 | End: 2025-01-19 | Stop reason: HOSPADM

## 2025-01-17 RX ORDER — AMOXICILLIN 250 MG
1 CAPSULE ORAL DAILY PRN
Status: DISCONTINUED | OUTPATIENT
Start: 2025-01-17 | End: 2025-01-19 | Stop reason: HOSPADM

## 2025-01-17 RX ORDER — TALC
6 POWDER (GRAM) TOPICAL NIGHTLY PRN
Status: DISCONTINUED | OUTPATIENT
Start: 2025-01-17 | End: 2025-01-19 | Stop reason: HOSPADM

## 2025-01-17 RX ORDER — ACETAMINOPHEN 325 MG/1
650 TABLET ORAL EVERY 8 HOURS PRN
Status: DISCONTINUED | OUTPATIENT
Start: 2025-01-17 | End: 2025-01-19 | Stop reason: HOSPADM

## 2025-01-17 RX ORDER — ONDANSETRON HYDROCHLORIDE 2 MG/ML
4 INJECTION, SOLUTION INTRAVENOUS EVERY 6 HOURS PRN
Status: DISCONTINUED | OUTPATIENT
Start: 2025-01-17 | End: 2025-01-19 | Stop reason: HOSPADM

## 2025-01-17 RX ORDER — IBUPROFEN 200 MG
24 TABLET ORAL
Status: DISCONTINUED | OUTPATIENT
Start: 2025-01-17 | End: 2025-01-19 | Stop reason: HOSPADM

## 2025-01-17 RX ORDER — SODIUM CHLORIDE, SODIUM LACTATE, POTASSIUM CHLORIDE, CALCIUM CHLORIDE 600; 310; 30; 20 MG/100ML; MG/100ML; MG/100ML; MG/100ML
INJECTION, SOLUTION INTRAVENOUS CONTINUOUS
Status: DISCONTINUED | OUTPATIENT
Start: 2025-01-17 | End: 2025-01-18

## 2025-01-17 RX ORDER — MORPHINE SULFATE 2 MG/ML
2 INJECTION, SOLUTION INTRAMUSCULAR; INTRAVENOUS
Status: DISCONTINUED | OUTPATIENT
Start: 2025-01-17 | End: 2025-01-19 | Stop reason: HOSPADM

## 2025-01-17 RX ORDER — ACETAMINOPHEN 500 MG
1000 TABLET ORAL
Status: COMPLETED | OUTPATIENT
Start: 2025-01-17 | End: 2025-01-17

## 2025-01-17 RX ORDER — CEFTRIAXONE 2 G/1
2 INJECTION, POWDER, FOR SOLUTION INTRAMUSCULAR; INTRAVENOUS ONCE
Status: COMPLETED | OUTPATIENT
Start: 2025-01-17 | End: 2025-01-17

## 2025-01-17 RX ORDER — ONDANSETRON HYDROCHLORIDE 2 MG/ML
4 INJECTION, SOLUTION INTRAVENOUS
Status: COMPLETED | OUTPATIENT
Start: 2025-01-17 | End: 2025-01-17

## 2025-01-17 RX ORDER — NALOXONE HCL 0.4 MG/ML
0.02 VIAL (ML) INJECTION
Status: DISCONTINUED | OUTPATIENT
Start: 2025-01-17 | End: 2025-01-19 | Stop reason: HOSPADM

## 2025-01-17 RX ORDER — ACETAMINOPHEN 325 MG/1
650 TABLET ORAL EVERY 4 HOURS PRN
Status: DISCONTINUED | OUTPATIENT
Start: 2025-01-17 | End: 2025-01-19 | Stop reason: HOSPADM

## 2025-01-17 RX ORDER — HYDROCODONE BITARTRATE AND ACETAMINOPHEN 5; 325 MG/1; MG/1
1 TABLET ORAL EVERY 6 HOURS PRN
Status: DISCONTINUED | OUTPATIENT
Start: 2025-01-17 | End: 2025-01-19 | Stop reason: HOSPADM

## 2025-01-17 RX ADMIN — IOHEXOL 100 ML: 350 INJECTION, SOLUTION INTRAVENOUS at 05:01

## 2025-01-17 RX ADMIN — ONDANSETRON 4 MG: 2 INJECTION INTRAMUSCULAR; INTRAVENOUS at 04:01

## 2025-01-17 RX ADMIN — FAMOTIDINE 20 MG: 10 INJECTION, SOLUTION INTRAVENOUS at 04:01

## 2025-01-17 RX ADMIN — CEFTRIAXONE 2 G: 2 INJECTION, POWDER, FOR SOLUTION INTRAMUSCULAR; INTRAVENOUS at 06:01

## 2025-01-17 RX ADMIN — SODIUM CHLORIDE, POTASSIUM CHLORIDE, SODIUM LACTATE AND CALCIUM CHLORIDE: 600; 310; 30; 20 INJECTION, SOLUTION INTRAVENOUS at 05:01

## 2025-01-17 RX ADMIN — SODIUM CHLORIDE, POTASSIUM CHLORIDE, SODIUM LACTATE AND CALCIUM CHLORIDE: 600; 310; 30; 20 INJECTION, SOLUTION INTRAVENOUS at 09:01

## 2025-01-17 RX ADMIN — ACETAMINOPHEN 1000 MG: 500 TABLET, FILM COATED ORAL at 04:01

## 2025-01-17 RX ADMIN — ENOXAPARIN SODIUM 40 MG: 40 INJECTION SUBCUTANEOUS at 05:01

## 2025-01-17 NOTE — ED PROVIDER NOTES
Encounter Date: 1/17/2025       History     Chief Complaint   Patient presents with    Abdominal Pain     Abd pain along with N/V denies diarrhea. Hx. Of chrons. 4 mg of zofran given by EMS. Pt. Concerned for food poisoning      HPI  Patient is a 69-year-old male with past medical history of Crohn's disease presenting for abdominal pain.  Reports that he suffers from long-term abdominal pain.  He reports that this episode started 2 days ago.  He describes epigastric abdominal pain that is unbearable with abdominal distention.  Symptoms are worse after eating and improved after vomiting.  Reports has had 7 episodes of emesis today.  Last bowel movement was today and passing gas.  He also reports that he has been dealing with a urinary infection for the past 2 months with on and off dysuria.  He denies any chest pain shortness of breath.      Review of patient's allergies indicates:  No Known Allergies  Past Medical History:   Diagnosis Date    Colon polyp     Crohn disease     Hard of hearing     Hepatitis C     Hepatitis C     Hypertension     TB lung, latent      Past Surgical History:   Procedure Laterality Date    ABDOMINAL SURGERY      BOWEL RESECTION    APPENDECTOMY      CHOLECYSTECTOMY       Family History   Problem Relation Name Age of Onset    COPD Mother      Cancer Mother       Social History     Tobacco Use    Smoking status: Never    Smokeless tobacco: Never   Substance Use Topics    Alcohol use: Never    Drug use: Never     Review of Systems   Constitutional:  Negative for fever.   HENT:  Negative for sore throat.    Respiratory:  Negative for shortness of breath.    Cardiovascular:  Negative for chest pain.   Gastrointestinal:  Positive for abdominal pain, nausea and vomiting.   Genitourinary:  Negative for dysuria.   Musculoskeletal:  Negative for back pain.   Skin:  Negative for rash.   Neurological:  Negative for weakness.   Hematological:  Does not bruise/bleed easily.       Physical Exam      Initial Vitals [01/17/25 0307]   BP Pulse Resp Temp SpO2   (!) 164/72 96 18 98.7 °F (37.1 °C) (!) 91 %      MAP       --         Physical Exam    Nursing note and vitals reviewed.  Constitutional: He appears well-developed.   HENT:   Head: Normocephalic.   Eyes: Pupils are equal, round, and reactive to light.   Neck:   Normal range of motion.  Cardiovascular:  Normal rate and normal heart sounds.           Pulmonary/Chest: Breath sounds normal.   Abdominal: Abdomen is soft.   Midline surgical scar clean dry intact.  Abdomen is distended.  Epigastric abdominal tenderness.  No rebound or guarding   Musculoskeletal:         General: Normal range of motion.      Cervical back: Normal range of motion.     Neurological: He is alert and oriented to person, place, and time.   Skin: Skin is warm.   Left lower extremity with previous skin graft.  Healing ulcer in the right shin         ED Course   Procedures  Labs Reviewed   CBC W/ AUTO DIFFERENTIAL - Abnormal       Result Value    WBC 4.65      RBC 3.64 (*)     Hemoglobin 10.0 (*)     Hematocrit 32.0 (*)     MCV 88      MCH 27.5      MCHC 31.3 (*)     RDW 15.0 (*)     Platelets 119 (*)     MPV 12.4      Immature Granulocytes 0.2      Gran # (ANC) 4.1      Immature Grans (Abs) 0.01      Lymph # 0.2 (*)     Mono # 0.3      Eos # 0.1      Baso # 0.01      nRBC 0      Gran % 87.3 (*)     Lymph % 3.9 (*)     Mono % 7.1      Eosinophil % 1.3      Basophil % 0.2      Differential Method Automated      Narrative:     Release to patient->Immediate   COMPREHENSIVE METABOLIC PANEL - Abnormal    Sodium 139      Potassium 3.8      Chloride 104      CO2 30 (*)     Glucose 102      BUN 10      Creatinine 0.9      Calcium 8.3 (*)     Total Protein 6.4      Albumin 3.3 (*)     Total Bilirubin 1.4 (*)     Alkaline Phosphatase 82      AST 24      ALT 12      eGFR >60.0      Anion Gap 5 (*)     Narrative:     Release to patient->Immediate   URINALYSIS, REFLEX TO URINE CULTURE - Abnormal     Specimen UA Urine, Clean Catch      Color, UA Yellow      Appearance, UA Hazy (*)     pH, UA 7.0      Specific Gravity, UA 1.020      Protein, UA Trace (*)     Glucose, UA Negative      Ketones, UA Negative      Bilirubin (UA) 1+ (*)     Occult Blood UA TRACE      Nitrite, UA Positive (*)     Urobilinogen, UA 2.0-3.0 (*)     Leukocytes, UA 3+ (*)     Narrative:     Specimen Source->Urine   PROTIME-INR - Abnormal    Prothrombin Time 12.7 (*)     INR 1.1     URINALYSIS MICROSCOPIC - Abnormal    RBC, UA 9 (*)     WBC, UA >100 (*)     Bacteria Few (*)     Unclass Noemi UA 2      Microscopic Comment SEE COMMENT      Narrative:     Specimen Source->Urine   CULTURE, URINE   LIPASE    Lipase 40      Narrative:     Release to patient->Immediate   C-REACTIVE PROTEIN    CRP 0.80      Narrative:     Release to patient->Immediate   TROPONIN I HIGH SENSITIVITY    Troponin I High Sensitivity 12.9     B-TYPE NATRIURETIC PEPTIDE    BNP 80     HEPATITIS C ANTIBODY   HIV 1 / 2 ANTIBODY          Imaging Results              CT Abdomen Pelvis With IV Contrast NO Oral Contrast (Final result)  Result time 01/17/25 07:08:58      Final result by Matthew Elder MD (01/17/25 07:08:58)                   Impression:      1. Dilated small intestines containing scattered air-fluid levels.  This pattern remains unchanged since 02/13/2021.  Small bowel obstruction or ileus can be considered in the appropriate clinical setting.  2. Subtle nodularity of liver surface contour with increased relative volume of left hepatic lobe.  This can be seen with cirrhosis in the appropriate clinical setting.  3. Increased splenomegaly.  4. Bladder wall thickening can be seen with infectious or inflammatory cystitis in the appropriate clinical setting.  Alternatively, this could be due to chronic bladder outlet obstruction.  5. Trace ascites.  6. New clustered reticulonodular opacities in posterior basilar right lower lobe.  Infectious or inflammatory bronchiolitis  is most likely and clinical correlation is requested.  CT thorax without IV contrast follow-up is recommended in 3 months to evaluate for resolution.      Electronically signed by: Matthew Elder  Date:    01/17/2025  Time:    07:08               Narrative:    EXAMINATION:  CT ABDOMEN PELVIS WITH IV CONTRAST    CLINICAL HISTORY:  Epigastric abdominal pain;    TECHNIQUE:  CT abdomen and pelvis with 100 mL Omnipaque 350.    COMPARISON:  12/31/2021    FINDINGS:  CT ABDOMEN:    Clustered reticulonodular opacities are new and posterior basilar right lower lobe.  Minimal subpleural reticular opacities in basilar left lower lobe.    There is question of nodularity of liver surface contour with relative increased volume of left hepatic lobe.  Scattered tiny hypodensities throughout the right hepatic lobe have not significantly changed.  Portal vein remains patent.  Spleen is enlarged, measuring 18.7 cm, increased from prior measurement of 16.8 cm.  Gallbladder is absent.  Pancreas is normal.  Bilateral adrenal glands are normal.  Tiny renal cortical hypodensities are too small to characterize but likely to reflect cysts.    Minor aortoiliac calcifications are present.    Dilated loops of small intestines reach up to 3.9 cm in diameter, with scattered small intestine air-fluid levels.  The appearance remains similar to that on prior exam.  Postsurgical changes of distal small intestine and ascending colon anastomosis is intact.  Large intestines otherwise unremarkable.  No free intraperitoneal gas.  Trace free intraperitoneal fluid is present.    Multifocal small to moderate size fat containing ventral hernias occur in supraumbilical location, unchanged.    No acute osseous abnormality.    CT PELVIS:    Circumferential bladder wall thickening is mild.  Prostate is slightly enlarged.  Small fat containing bilateral inguinal hernias are unchanged.  Surgical hardware transfixing right proximal femur with osseous remodeling from  old healed fracture is evident in partially visualized.  No acute osseous abnormality.                                       Medications   melatonin tablet 6 mg (has no administration in time range)   ondansetron injection 4 mg (has no administration in time range)   senna-docusate 8.6-50 mg per tablet 1 tablet (has no administration in time range)   acetaminophen tablet 650 mg (has no administration in time range)   aluminum-magnesium hydroxide-simethicone 200-200-20 mg/5 mL suspension 30 mL (has no administration in time range)   acetaminophen tablet 650 mg (has no administration in time range)   HYDROcodone-acetaminophen 5-325 mg per tablet 1 tablet (has no administration in time range)   naloxone 0.4 mg/mL injection 0.02 mg (has no administration in time range)   potassium bicarbonate disintegrating tablet 50 mEq (has no administration in time range)   potassium bicarbonate disintegrating tablet 35 mEq (has no administration in time range)   potassium bicarbonate disintegrating tablet 60 mEq (has no administration in time range)   magnesium oxide tablet 800 mg (has no administration in time range)   magnesium oxide tablet 800 mg (has no administration in time range)   potassium, sodium phosphates 280-160-250 mg packet 2 packet (has no administration in time range)   potassium, sodium phosphates 280-160-250 mg packet 2 packet (has no administration in time range)   potassium, sodium phosphates 280-160-250 mg packet 2 packet (has no administration in time range)   glucose chewable tablet 16 g (has no administration in time range)   glucose chewable tablet 24 g (has no administration in time range)   dextrose 50% injection 12.5 g (has no administration in time range)   dextrose 50% injection 25 g (has no administration in time range)   glucagon (human recombinant) injection 1 mg (has no administration in time range)   enoxaparin injection 40 mg (40 mg Subcutaneous Given 1/17/25 9720)   lactated ringers infusion (  "Intravenous New Bag 1/17/25 4384)   cefTRIAXone injection 1 g (has no administration in time range)   ondansetron injection 4 mg (4 mg Intravenous Given 1/17/25 0105)   acetaminophen tablet 1,000 mg (1,000 mg Oral Given 1/17/25 9254)   famotidine (PF) injection 20 mg (20 mg Intravenous Given 1/17/25 2126)   iohexoL (OMNIPAQUE 350) injection 100 mL (100 mLs Intravenous Given 1/17/25 8507)   cefTRIAXone injection 2 g (2 g Intravenous Given 1/17/25 6711)       [facility]  Patient is a 69 y.o. male past medical history of Crohn's disease presenting for abdominal pain.  Patient reports that he suffers from chronic lower abdominal pain however today his pain episode is more epigastric.    Initial Vitals:  BP: (!) 164/72  Pulse: 96  Temp: 98.7 °F (37.1 °C)  Resp: 18  Height: 6' 1" (185.4 cm)  Weight: 106.6 kg (235 lb 0.2 oz)  BMI (Calculated): 31  SpO2: (!) 91 % physical exam as above    Differential Diagnosis: I considered Crohn's disease, pancreatitis, small-bowel obstruction, ACS, among other diagnoses    Update:  CBC shows stable hemoglobin and no leukocytosis.  CMP without acute electrolyte derangement except for slightly elevated bilirubin of 1.4.  Lipase 40.  UA shows infection with positive nitrites, leukocytes, and white blood cells.  Started on ceftriaxone.    Cardiac workup reassuring given normal EKG, i-STAT troponin 12.9, BNP of 80.    CT abdomen and pelvis shows dilated small bowel with air-fluid levels concerning for small bowel obstruction/ileus, increased splenomegaly, cystitis, trace ascites, new clustered reticular nodule opacities in the posterior basilar right lower lobe.    Medicine consulted for admission.    See ED course for updates. Patients seen and discussed with attending physician. Attestation to follow.    Jomar Ying MD  [facility]      Medical Decision Making  Amount and/or Complexity of Data Reviewed  Labs: ordered.  Radiology: ordered.    Risk  OTC " drugs.  Prescription drug management.  Decision regarding hospitalization.              Attending Attestation:   Physician Attestation Statement for Resident:  As the supervising MD   Physician Attestation Statement: I have personally seen and examined this patient.   I agree with the above history.  -:   As the supervising MD I agree with the above PE.     As the supervising MD I agree with the above treatment, course, plan, and disposition.   -: I had a face to face encounter with the patient.  I examined the patient.  I have reviewed and agree with the resident's findings, including all diagnostic interpretations and plans as written and medical decision making.  I was present for the key portions of the exam and procedures.     Abdominal pain nausea or concerned he may have a UTI, vitals are within normal limits, on exam his abdomen is distended but soft, he says this is how it always looks.  She has imaging shows possible ileus versus small-bowel obstruction.  It looks unchanged from 2021.  He said he always has issues like this.  He reports passing a normal bowel movement today, clinically I do not think he is obstructed.  He may have a UA developing partial obstruction or this may be a chronic finding.  He also has a UTI.  With his history I think it is reasonable to bring him in for admission.  I do not have an indication for antibiotics.  I do not think he needs an NG tube at this time.  Discussed with [facility] medicine    Enrique Palacoi  Chart dictated using voice recognition software, possible dictation errors present      I have reviewed the following: old records at this facility.                ED Course as of 01/17/25 2010 Fri Jan 17, 2025   0325 EKG shows normal sinus rhythm with ventricular rate of 90.  Right bundle branch block.  No STEMI. []      ED Course User Index  [AH] Jomar Ying MD                           Clinical Impression:  Final diagnoses:  [R10.9] Abdominal pain, unspecified  abdominal location (Primary)  [K56.600] Partial small bowel obstruction  [N30.90] Cystitis  [K50.919] Crohn's disease with complication, unspecified gastrointestinal tract location  [R91.1] Pulmonary nodule          ED Disposition Condition    Admit                 Jomar Ying MD  Resident  01/17/25 0722       Enrique Palacio MD  01/17/25 2010

## 2025-01-17 NOTE — SUBJECTIVE & OBJECTIVE
Past Medical History:   Diagnosis Date    Colon polyp     Crohn disease     Hard of hearing     Hepatitis C     Hepatitis C     Hypertension     TB lung, latent        Past Surgical History:   Procedure Laterality Date    ABDOMINAL SURGERY      BOWEL RESECTION    APPENDECTOMY      CHOLECYSTECTOMY         Review of patient's allergies indicates:  No Known Allergies    No current facility-administered medications on file prior to encounter.     Current Outpatient Medications on File Prior to Encounter   Medication Sig    methadone HCl (METHADONE ORAL) Take 1 tablet by mouth once daily.    [DISCONTINUED] amLODIPine (NORVASC) 10 MG tablet Take 10 mg by mouth once daily.    [DISCONTINUED] ergocalciferol (ERGOCALCIFEROL) 50,000 unit Cap Take 50,000 Units by mouth every 7 days.    [DISCONTINUED] magnesium oxide (MAG-OX) 400 mg (241.3 mg magnesium) tablet Take 1 tablet (400 mg total) by mouth once daily.    [DISCONTINUED] potassium chloride SA (K-DUR,KLOR-CON) 20 MEQ tablet Take 1 tablet (20 mEq total) by mouth 2 (two) times daily.    [DISCONTINUED] promethazine (PHENERGAN) 25 MG tablet Take 1 tablet (25 mg total) by mouth every 6 (six) hours as needed for Nausea.     Family History       Problem Relation (Age of Onset)    COPD Mother    Cancer Mother          Tobacco Use    Smoking status: Never    Smokeless tobacco: Never   Substance and Sexual Activity    Alcohol use: Never    Drug use: Never    Sexual activity: Not on file     Review of Systems   Constitutional:  Negative for chills, fatigue and fever.   Respiratory:  Negative for shortness of breath.    Cardiovascular:  Negative for chest pain and leg swelling.   Gastrointestinal:  Positive for abdominal pain, nausea and vomiting.   Neurological:  Positive for weakness. Negative for dizziness.   Psychiatric/Behavioral:  Negative for agitation and confusion.      Objective:     Vital Signs (Most Recent):  Temp: 98.7 °F (37.1 °C) (01/17/25 0307)  Pulse: 85 (01/17/25  0615)  Resp: 18 (01/17/25 0615)  BP: (!) 142/67 (01/17/25 0615)  SpO2: (!) 94 % (01/17/25 0615) Vital Signs (24h Range):  Temp:  [98.7 °F (37.1 °C)] 98.7 °F (37.1 °C)  Pulse:  [85-96] 85  Resp:  [18] 18  SpO2:  [91 %-96 %] 94 %  BP: (142-164)/(67-72) 142/67        Body mass index is 31 kg/m².     Physical Exam  Constitutional:       General: He is not in acute distress.     Appearance: Normal appearance. He is not toxic-appearing.   HENT:      Head: Normocephalic and atraumatic.      Nose: No rhinorrhea.   Eyes:      Extraocular Movements: Extraocular movements intact.   Cardiovascular:      Rate and Rhythm: Normal rate.   Pulmonary:      Effort: Pulmonary effort is normal. No respiratory distress.      Breath sounds: Normal breath sounds. No wheezing or rales.   Abdominal:      General: There is distension.      Tenderness: There is no abdominal tenderness. There is no guarding or rebound.   Musculoskeletal:      Right lower leg: No edema.      Left lower leg: No edema.   Skin:     General: Skin is warm and dry.   Neurological:      General: No focal deficit present.      Mental Status: He is oriented to person, place, and time.   Psychiatric:         Mood and Affect: Mood normal.         Behavior: Behavior normal.                Significant Labs: All pertinent labs within the past 24 hours have been reviewed.  BMP:   Recent Labs   Lab 01/17/25  0341         K 3.8      CO2 30*   BUN 10   CREATININE 0.9   CALCIUM 8.3*     CBC:   Recent Labs   Lab 01/17/25  0341   WBC 4.65   HGB 10.0*   HCT 32.0*   *       Significant Imaging: I have reviewed all pertinent imaging results/findings within the past 24 hours.

## 2025-01-17 NOTE — H&P
[facility] - Emergency Dept  Hospital Medicine  History & Physical    Patient Name: Stevie Cristina  MRN: [id number]  Patient Class: IP- Inpatient  Admission Date: 1/17/2025  Attending Physician: Shereen Salmon MD   Primary Care Provider: [facility]         Patient information was obtained from patient and ER records.     Subjective:     Principal Problem:Partial small bowel obstruction    Chief Complaint:   Chief Complaint   Patient presents with    Abdominal Pain     Abd pain along with N/V denies diarrhea. Hx. Of chrons. 4 mg of zofran given by EMS. Pt. Concerned for food poisoning         HPI: Very pleasant 69-year-old gentleman with history of Crohn's disease no longer follows with gastroenterologist he states that all of his previous provider so since passed away.  He does not take any disease modifying agents for this.  Who presents with complaints of nausea and emesis of 1 day duration.  He denies any subjective fever, chills.  Abdominal pain is felt to be mild.  He states that his distention on exam is at his baseline.  Imaging consistent partial small-bowel, however, he is passing stool.  We will admit for partial small bowel obstruction.  No indication for NG tube, bowel rest, antiemetics and IV fluids.  Additionally, patient is found to have a UTI, following urine culture and continuing Rocephin.    Past Medical History:   Diagnosis Date    Colon polyp     Crohn disease     Hard of hearing     Hepatitis C     Hepatitis C     Hypertension     TB lung, latent        Past Surgical History:   Procedure Laterality Date    ABDOMINAL SURGERY      BOWEL RESECTION    APPENDECTOMY      CHOLECYSTECTOMY         Review of patient's allergies indicates:  No Known Allergies    No current facility-administered medications on file prior to encounter.     Current Outpatient Medications on File Prior to Encounter   Medication Sig    methadone HCl (METHADONE ORAL)  Take 1 tablet by mouth once daily.    [DISCONTINUED] amLODIPine (NORVASC) 10 MG tablet Take 10 mg by mouth once daily.    [DISCONTINUED] ergocalciferol (ERGOCALCIFEROL) 50,000 unit Cap Take 50,000 Units by mouth every 7 days.    [DISCONTINUED] magnesium oxide (MAG-OX) 400 mg (241.3 mg magnesium) tablet Take 1 tablet (400 mg total) by mouth once daily.    [DISCONTINUED] potassium chloride SA (K-DUR,KLOR-CON) 20 MEQ tablet Take 1 tablet (20 mEq total) by mouth 2 (two) times daily.    [DISCONTINUED] promethazine (PHENERGAN) 25 MG tablet Take 1 tablet (25 mg total) by mouth every 6 (six) hours as needed for Nausea.     Family History       Problem Relation (Age of Onset)    COPD Mother    Cancer Mother          Tobacco Use    Smoking status: Never    Smokeless tobacco: Never   Substance and Sexual Activity    Alcohol use: Never    Drug use: Never    Sexual activity: Not on file     Review of Systems   Constitutional:  Negative for chills, fatigue and fever.   Respiratory:  Negative for shortness of breath.    Cardiovascular:  Negative for chest pain and leg swelling.   Gastrointestinal:  Positive for abdominal pain, nausea and vomiting.   Neurological:  Positive for weakness. Negative for dizziness.   Psychiatric/Behavioral:  Negative for agitation and confusion.      Objective:     Vital Signs (Most Recent):  Temp: 98.7 °F (37.1 °C) (01/17/25 0307)  Pulse: 85 (01/17/25 0615)  Resp: 18 (01/17/25 0615)  BP: (!) 142/67 (01/17/25 0615)  SpO2: (!) 94 % (01/17/25 0615) Vital Signs (24h Range):  Temp:  [98.7 °F (37.1 °C)] 98.7 °F (37.1 °C)  Pulse:  [85-96] 85  Resp:  [18] 18  SpO2:  [91 %-96 %] 94 %  BP: (142-164)/(67-72) 142/67        Body mass index is 31 kg/m².     Physical Exam  Constitutional:       General: He is not in acute distress.     Appearance: Normal appearance. He is not toxic-appearing.   HENT:      Head: Normocephalic and atraumatic.      Nose: No rhinorrhea.   Eyes:      Extraocular Movements: Extraocular  movements intact.   Cardiovascular:      Rate and Rhythm: Normal rate.   Pulmonary:      Effort: Pulmonary effort is normal. No respiratory distress.      Breath sounds: Normal breath sounds. No wheezing or rales.   Abdominal:      General: There is distension.      Tenderness: There is no abdominal tenderness. There is no guarding or rebound.   Musculoskeletal:      Right lower leg: No edema.      Left lower leg: No edema.   Skin:     General: Skin is warm and dry.   Neurological:      General: No focal deficit present.      Mental Status: He is oriented to person, place, and time.   Psychiatric:         Mood and Affect: Mood normal.         Behavior: Behavior normal.                Significant Labs: All pertinent labs within the past 24 hours have been reviewed.  BMP:   Recent Labs   Lab 01/17/25  0341         K 3.8      CO2 30*   BUN 10   CREATININE 0.9   CALCIUM 8.3*     CBC:   Recent Labs   Lab 01/17/25  0341   WBC 4.65   HGB 10.0*   HCT 32.0*   *       Significant Imaging: I have reviewed all pertinent imaging results/findings within the past 24 hours.  Assessment/Plan:     * Partial small bowel obstruction  Recurrent admissions for the same, has previously self resolved  Currently passing stool  Bowel rest  IVF  Antiemetics  No NG indicated at this time, will hold off on general surgery      Pulmonary nodule  Incidental finding, will need repeat CT in 3 months with PCP      Cystitis  Continue ceftriaxone, follow culture        VTE Risk Mitigation (From admission, onward)           Ordered     enoxaparin injection 40 mg  Daily         01/17/25 0742     IP VTE HIGH RISK PATIENT  Once         01/17/25 0742     Place sequential compression device  Until discontinued         01/17/25 0742                                    Shereen Salmon MD  Department of Hospital Medicine  [facility] - Emergency Dept

## 2025-01-17 NOTE — PLAN OF CARE
[facility] - Emergency Dept  Initial Discharge Assessment     [facility] called daughter to complete assessment. Left vm.  Primary Care Provider: [facility] - Family    Admission Diagnosis: Abdominal pain, unspecified abdominal location [R10.9]    Admission Date: 1/17/2025  Expected Discharge Date: 1/20/2025         Payor: [employer] MGD [facility] / Plan: [employer] / Product Type: Medicare Advantage /     Extended Emergency Contact Information  Primary Emergency Contact: Carey Lovelace  Address: [address] [place] [address] [place] [address] [facility]  Home Phone: [phone]  Mobile Phone: [phone]  Relation: Daughter  Preferred language: English   needed? No              [facility] [place] - [address]  [address] [address] [facility]  [place] [address]  Phone: [phone] Fax: [phone]    [id number] - [place] - [address] [facility] AT [facility] [address] [place] [address]  Phone: [phone] Fax: [phone]      Initial Assessment (most recent)       Adult Discharge Assessment - 01/17/25 1440          Discharge Assessment    Assessment Type Discharge Planning Assessment

## 2025-01-17 NOTE — HPI
Very pleasant 69-year-old gentleman with history of Crohn's disease no longer follows with gastroenterologist he states that all of his previous provider so since passed away.  He does not take any disease modifying agents for this.  Who presents with complaints of nausea and emesis of 1 day duration.  He denies any subjective fever, chills.  Abdominal pain is felt to be mild.  He states that his distention on exam is at his baseline.  Imaging consistent partial small-bowel, however, he is passing stool.  We will admit for partial small bowel obstruction.  No indication for NG tube, bowel rest, antiemetics and IV fluids.  Additionally, patient is found to have a UTI, following urine culture and continuing Rocephin.

## 2025-01-17 NOTE — ASSESSMENT & PLAN NOTE
Recurrent admissions for the same, has previously self resolved  Currently passing stool  Bowel rest  IVF  Antiemetics  No NG indicated at this time, will hold off on general surgery

## 2025-01-18 LAB
ALBUMIN SERPL BCP-MCNC: 2.8 G/DL (ref 3.5–5.2)
ALP SERPL-CCNC: 63 U/L (ref 55–135)
ALT SERPL W/O P-5'-P-CCNC: 9 U/L (ref 10–44)
ANION GAP SERPL CALC-SCNC: 4 MMOL/L (ref 8–16)
ANISOCYTOSIS BLD QL SMEAR: SLIGHT
AST SERPL-CCNC: 17 U/L (ref 10–40)
BASOPHILS # BLD AUTO: ABNORMAL K/UL (ref 0–0.2)
BASOPHILS NFR BLD: 0 % (ref 0–1.9)
BILIRUB SERPL-MCNC: 1.1 MG/DL (ref 0.1–1)
BUN SERPL-MCNC: 9 MG/DL (ref 8–23)
CALCIUM SERPL-MCNC: 7.9 MG/DL (ref 8.7–10.5)
CHLORIDE SERPL-SCNC: 105 MMOL/L (ref 95–110)
CO2 SERPL-SCNC: 29 MMOL/L (ref 23–29)
CREAT SERPL-MCNC: 0.8 MG/DL (ref 0.5–1.4)
DIFFERENTIAL METHOD BLD: ABNORMAL
EOSINOPHIL # BLD AUTO: ABNORMAL K/UL (ref 0–0.5)
EOSINOPHIL NFR BLD: 5 % (ref 0–8)
ERYTHROCYTE [DISTWIDTH] IN BLOOD BY AUTOMATED COUNT: 15.2 % (ref 11.5–14.5)
EST. GFR  (NO RACE VARIABLE): >60 ML/MIN/1.73 M^2
GLUCOSE SERPL-MCNC: 64 MG/DL (ref 70–110)
HCT VFR BLD AUTO: 27.6 % (ref 40–54)
HGB BLD-MCNC: 8.4 G/DL (ref 14–18)
HYPOCHROMIA BLD QL SMEAR: ABNORMAL
IMM GRANULOCYTES # BLD AUTO: ABNORMAL K/UL (ref 0–0.04)
IMM GRANULOCYTES NFR BLD AUTO: ABNORMAL % (ref 0–0.5)
LYMPHOCYTES # BLD AUTO: ABNORMAL K/UL (ref 1–4.8)
LYMPHOCYTES NFR BLD: 20 % (ref 18–48)
MAGNESIUM SERPL-MCNC: 1.5 MG/DL (ref 1.6–2.6)
MCH RBC QN AUTO: 26.5 PG (ref 27–31)
MCHC RBC AUTO-ENTMCNC: 30.4 G/DL (ref 32–36)
MCV RBC AUTO: 87 FL (ref 82–98)
MONOCYTES # BLD AUTO: ABNORMAL K/UL (ref 0.3–1)
MONOCYTES NFR BLD: 6 % (ref 4–15)
NEUTROPHILS NFR BLD: 69 % (ref 38–73)
NRBC BLD-RTO: 0 /100 WBC
PLATELET # BLD AUTO: 84 K/UL (ref 150–450)
PLATELET BLD QL SMEAR: ABNORMAL
PMV BLD AUTO: 12.1 FL (ref 9.2–12.9)
POTASSIUM SERPL-SCNC: 3.7 MMOL/L (ref 3.5–5.1)
PROT SERPL-MCNC: 5.5 G/DL (ref 6–8.4)
RBC # BLD AUTO: 3.17 M/UL (ref 4.6–6.2)
SODIUM SERPL-SCNC: 138 MMOL/L (ref 136–145)
WBC # BLD AUTO: 1.36 K/UL (ref 3.9–12.7)

## 2025-01-18 PROCEDURE — 36415 COLL VENOUS BLD VENIPUNCTURE: CPT

## 2025-01-18 PROCEDURE — 12000002 HC ACUTE/MED SURGE SEMI-PRIVATE ROOM

## 2025-01-18 PROCEDURE — 63600175 PHARM REV CODE 636 W HCPCS

## 2025-01-18 PROCEDURE — 85027 COMPLETE CBC AUTOMATED: CPT

## 2025-01-18 PROCEDURE — 25000003 PHARM REV CODE 250: Performed by: INTERNAL MEDICINE

## 2025-01-18 PROCEDURE — 80053 COMPREHEN METABOLIC PANEL: CPT

## 2025-01-18 PROCEDURE — 85007 BL SMEAR W/DIFF WBC COUNT: CPT

## 2025-01-18 PROCEDURE — 83735 ASSAY OF MAGNESIUM: CPT

## 2025-01-18 RX ORDER — METHADONE HYDROCHLORIDE 10 MG/1
110 TABLET ORAL DAILY
Status: DISCONTINUED | OUTPATIENT
Start: 2025-01-18 | End: 2025-01-19 | Stop reason: HOSPADM

## 2025-01-18 RX ORDER — METHADONE HYDROCHLORIDE 10 MG/1
110 TABLET ORAL DAILY
Status: DISCONTINUED | OUTPATIENT
Start: 2025-01-19 | End: 2025-01-18

## 2025-01-18 RX ADMIN — ENOXAPARIN SODIUM 40 MG: 40 INJECTION SUBCUTANEOUS at 05:01

## 2025-01-18 RX ADMIN — CEFTRIAXONE 1 G: 1 INJECTION, POWDER, FOR SOLUTION INTRAMUSCULAR; INTRAVENOUS at 06:01

## 2025-01-18 RX ADMIN — METHADONE HYDROCHLORIDE 110 MG: 10 TABLET ORAL at 05:01

## 2025-01-18 RX ADMIN — SODIUM CHLORIDE, POTASSIUM CHLORIDE, SODIUM LACTATE AND CALCIUM CHLORIDE: 600; 310; 30; 20 INJECTION, SOLUTION INTRAVENOUS at 09:01

## 2025-01-18 NOTE — SUBJECTIVE & OBJECTIVE
Interval History: Patient is feeling well. He is passing flatus. Abdominal pain and nausea and vomiting is resolved. He is requesting his chronic methadone. No other overnight events.     Review of Systems  Objective:     Vital Signs (Most Recent):  Temp: 99.4 °F (37.4 °C) (01/18/25 1603)  Pulse: 80 (01/18/25 1603)  Resp: 18 (01/18/25 1603)  BP: (!) 159/72 (01/18/25 1603)  SpO2: (!) 93 % (01/18/25 1603) Vital Signs (24h Range):  Temp:  [98.1 °F (36.7 °C)-99.4 °F (37.4 °C)] 99.4 °F (37.4 °C)  Pulse:  [70-80] 80  Resp:  [18-19] 18  SpO2:  [91 %-95 %] 93 %  BP: (113-159)/(57-77) 159/72     Weight: 106.6 kg (235 lb 0.2 oz)  Body mass index is 31.01 kg/m².    Intake/Output Summary (Last 24 hours) at 1/18/2025 1655  Last data filed at 1/18/2025 1300  Gross per 24 hour   Intake 0 ml   Output 1050 ml   Net -1050 ml         Physical Exam  Constitutional:       General: He is not in acute distress.     Appearance: Normal appearance. He is not toxic-appearing.   HENT:      Head: Normocephalic and atraumatic.      Nose: No rhinorrhea.   Eyes:      Extraocular Movements: Extraocular movements intact.   Cardiovascular:      Rate and Rhythm: Normal rate.   Pulmonary:      Effort: Pulmonary effort is normal. No respiratory distress.      Breath sounds: Normal breath sounds. No wheezing or rales.   Abdominal:      Tenderness: There is no abdominal tenderness. There is no guarding or rebound.   Musculoskeletal:      Right lower leg: No edema.      Left lower leg: No edema.   Skin:     General: Skin is warm and dry.   Neurological:      General: No focal deficit present.      Mental Status: He is oriented to person, place, and time.   Psychiatric:         Mood and Affect: Mood normal.         Behavior: Behavior normal.     Significant Labs: All pertinent labs within the past 24 hours have been reviewed.  CBC:   Recent Labs   Lab 01/17/25  0341 01/18/25  0723   WBC 4.65 1.36*   HGB 10.0* 8.4*   HCT 32.0* 27.6*   * 84*     CMP:    Recent Labs   Lab 01/17/25  0341 01/18/25  0723    138   K 3.8 3.7    105   CO2 30* 29    64*   BUN 10 9   CREATININE 0.9 0.8   CALCIUM 8.3* 7.9*   PROT 6.4 5.5*   ALBUMIN 3.3* 2.8*   BILITOT 1.4* 1.1*   ALKPHOS 82 63   AST 24 17   ALT 12 9*   ANIONGAP 5* 4*       Significant Imaging: I have reviewed all pertinent imaging results/findings within the past 24 hours.

## 2025-01-18 NOTE — PLAN OF CARE
[facility]  Initial Discharge Assessment       Primary Care Provider: [facility] - Family    Admission Diagnosis: Abdominal pain, unspecified abdominal location [R10.9]    Admission Date: 1/17/2025  Expected Discharge Date: 1/21/2025    met with Pt at bedside to complete discharge assessment. Pt AAOx4s. Demographics, PCP, and insurance verified. No home health. No dialysis. Pt reports ability to complete ADLs without assistance. Pt verbalized needing help  to discharge home  Pt has no other needs to be addressed at this time.    Transition of Care Barriers: None    Payor: [employer] MGD [facility] / Plan: [employer] SNP / Product Type: Medicare Advantage /     Extended Emergency Contact Information  Primary Emergency Contact: Carey Lovelace  Address: [address] [place] [address] [place] [address] [facility]  Home Phone: [phone]  Mobile Phone: [phone]  Relation: Daughter  Preferred language: English   needed? No    Discharge Plan A: Home with family  Discharge Plan B: Home with family      [facility] [place] - [address] [address]  [address] [facility] [address]  Phone: [phone] Fax: [phone]    [id number] [place], [place] - [address] [facility] AT [facility] & [address] [address]  Phone: [phone] Fax: [phone]      Initial Assessment (most recent)       Adult Discharge Assessment - 01/18/25 1008          Discharge Assessment    Assessment Type Discharge Planning Assessment     Confirmed/corrected address, phone number and insurance Yes     Confirmed Demographics Correct on Facesheet     Source of Information patient     Communicated JUNE with patient/caregiver Date not available/Unable to determine     Reason For Admission Partial small bowel obstruction     People in Home spouse;child(dior), adult     Do you expect to return to your  current living situation? Yes     Do you have help at home or someone to help you manage your care at home? Yes     Who are your caregiver(s) and their phone number(s)? Carey Lovelace (Daughter)  [phone]     Prior to hospitilization cognitive status: Alert/Oriented     Current cognitive status: Alert/Oriented     Walking or Climbing Stairs Difficulty yes     Dressing/Bathing Difficulty no     Home Accessibility wheelchair accessible     Home Layout Able to live on 1st floor     Equipment Currently Used at Home none     Readmission within 30 days? No     Patient currently being followed by outpatient case management? No     Do you currently have service(s) that help you manage your care at home? No     Do you take prescription medications? Yes     Do you have prescription coverage? Yes     Coverage Payor: [employer] [facility]GD [facility]     Do you have any problems affording any of your prescribed medications? No     Is the patient taking medications as prescribed? yes     Who is going to help you get home at discharge? Carey Lovelace (Daughter)  [phone]     How do you get to doctors appointments? family or friend will provide     Are you on dialysis? No     Do you take coumadin? No     Discharge Plan A Home with family     Discharge Plan B Home with family     DME Needed Upon Discharge  none     Discharge Plan discussed with: Patient     Transition of Care Barriers None

## 2025-01-18 NOTE — HOSPITAL COURSE
Patient is a 69 year old male with previous history of crohn's disease, admitted with partial SBO and UTI. SBO resolved spontaneously. Patient was started on liquid diet. Patient tolerating diet and advanced to soft low fiber diet. Urine culture is growing MSSA, patient received Rocephin and will DC with 5 more days of Keflex. Patient was found to be leukopenic, which seems to be chronic looking back at the labs from 2023. CBC will be repeated at PCP appt in a week.

## 2025-01-18 NOTE — PROGRESS NOTES
[facility]  Progress Note    Patient Name: Stevie Cristina  MRN: [id number]  Patient Class: IP- Inpatient   Admission Date: 1/17/2025  Length of Stay: 1 days  Attending Physician: Jennifer Crowley MD  Primary Care Provider: Medicine, [facility] - Family        Subjective     Principal Problem:Partial small bowel obstruction        HPI:  Very pleasant 69-year-old gentleman with history of Crohn's disease no longer follows with gastroenterologist he states that all of his previous provider so since passed away.  He does not take any disease modifying agents for this.  Who presents with complaints of nausea and emesis of 1 day duration.  He denies any subjective fever, chills.  Abdominal pain is felt to be mild.  He states that his distention on exam is at his baseline.  Imaging consistent partial small-bowel, however, he is passing stool.  We will admit for partial small bowel obstruction.  No indication for NG tube, bowel rest, antiemetics and IV fluids.  Additionally, patient is found to have a UTI, following urine culture and continuing Rocephin.    Overview/Hospital Course:  Patient is a 69 year old male with previous history of crohn's disease, admitted with partial SBO and UTI. SBO resolved spontaneously. Patient was started on liquid diet,.     Interval History: Patient is feeling well. He is passing flatus. Abdominal pain and nausea and vomiting is resolved. He is requesting his chronic methadone. No other overnight events.     Review of Systems  Objective:     Vital Signs (Most Recent):  Temp: 99.4 °F (37.4 °C) (01/18/25 1603)  Pulse: 80 (01/18/25 1603)  Resp: 18 (01/18/25 1603)  BP: (!) 159/72 (01/18/25 1603)  SpO2: (!) 93 % (01/18/25 1603) Vital Signs (24h Range):  Temp:  [98.1 °F (36.7 °C)-99.4 °F (37.4 °C)] 99.4 °F (37.4 °C)  Pulse:  [70-80] 80  Resp:  [18-19] 18  SpO2:  [91 %-95 %] 93 %  BP: (113-159)/(57-77) 159/72     Weight: 106.6 kg (235 lb 0.2  oz)  Body mass index is 31.01 kg/m².    Intake/Output Summary (Last 24 hours) at 1/18/2025 1655  Last data filed at 1/18/2025 1300  Gross per 24 hour   Intake 0 ml   Output 1050 ml   Net -1050 ml         Physical Exam  Constitutional:       General: He is not in acute distress.     Appearance: Normal appearance. He is not toxic-appearing.   HENT:      Head: Normocephalic and atraumatic.      Nose: No rhinorrhea.   Eyes:      Extraocular Movements: Extraocular movements intact.   Cardiovascular:      Rate and Rhythm: Normal rate.   Pulmonary:      Effort: Pulmonary effort is normal. No respiratory distress.      Breath sounds: Normal breath sounds. No wheezing or rales.   Abdominal:      Tenderness: There is no abdominal tenderness. There is no guarding or rebound.   Musculoskeletal:      Right lower leg: No edema.      Left lower leg: No edema.   Skin:     General: Skin is warm and dry.   Neurological:      General: No focal deficit present.      Mental Status: He is oriented to person, place, and time.   Psychiatric:         Mood and Affect: Mood normal.         Behavior: Behavior normal.     Significant Labs: All pertinent labs within the past 24 hours have been reviewed.  CBC:   Recent Labs   Lab 01/17/25  0341 01/18/25  0723   WBC 4.65 1.36*   HGB 10.0* 8.4*   HCT 32.0* 27.6*   * 84*     CMP:   Recent Labs   Lab 01/17/25  0341 01/18/25  0723    138   K 3.8 3.7    105   CO2 30* 29    64*   BUN 10 9   CREATININE 0.9 0.8   CALCIUM 8.3* 7.9*   PROT 6.4 5.5*   ALBUMIN 3.3* 2.8*   BILITOT 1.4* 1.1*   ALKPHOS 82 63   AST 24 17   ALT 12 9*   ANIONGAP 5* 4*       Significant Imaging: I have reviewed all pertinent imaging results/findings within the past 24 hours.    Assessment and Plan     * Partial small bowel obstruction  Hx of crohn's disease, now on remission   Resolved   Cont symptomatic Rx   Started clear liquid diet, advance as tolerated       Pulmonary nodule  Incidental finding, will  need repeat CT in 3 months with PCP      Cystitis  Continue ceftriaxone  Urine culture staph species, follow final         VTE Risk Mitigation (From admission, onward)           Ordered     enoxaparin injection 40 mg  Daily         01/17/25 0742     IP VTE HIGH RISK PATIENT  Once         01/17/25 0742     Place sequential compression device  Until discontinued         01/17/25 0742                    Discharge Planning   JUNE: 1/21/2025     Code Status: Full Code   Medical Readiness for Discharge Date:   Discharge Plan A: Home with family                        Jennifer Crowley MD  Department of Hospital Medicine   [facility]

## 2025-01-18 NOTE — ASSESSMENT & PLAN NOTE
Hx of crohn's disease, now on remission   Resolved   Cont symptomatic Rx   Started clear liquid diet, advance as tolerated

## 2025-01-19 VITALS
HEART RATE: 73 BPM | SYSTOLIC BLOOD PRESSURE: 151 MMHG | DIASTOLIC BLOOD PRESSURE: 74 MMHG | TEMPERATURE: 98 F | HEIGHT: 73 IN | BODY MASS INDEX: 31.14 KG/M2 | WEIGHT: 235 LBS | OXYGEN SATURATION: 93 % | RESPIRATION RATE: 18 BRPM

## 2025-01-19 LAB
ALBUMIN SERPL BCP-MCNC: 2.8 G/DL (ref 3.5–5.2)
ALP SERPL-CCNC: 59 U/L (ref 55–135)
ALT SERPL W/O P-5'-P-CCNC: 8 U/L (ref 10–44)
ANION GAP SERPL CALC-SCNC: 5 MMOL/L (ref 8–16)
ANISOCYTOSIS BLD QL SMEAR: SLIGHT
AST SERPL-CCNC: 15 U/L (ref 10–40)
BACTERIA UR CULT: ABNORMAL
BASOPHILS # BLD AUTO: 0.01 K/UL (ref 0–0.2)
BASOPHILS NFR BLD: 0.8 % (ref 0–1.9)
BILIRUB SERPL-MCNC: 0.9 MG/DL (ref 0.1–1)
BUN SERPL-MCNC: 8 MG/DL (ref 8–23)
CALCIUM SERPL-MCNC: 8 MG/DL (ref 8.7–10.5)
CHLORIDE SERPL-SCNC: 103 MMOL/L (ref 95–110)
CO2 SERPL-SCNC: 29 MMOL/L (ref 23–29)
CREAT SERPL-MCNC: 0.7 MG/DL (ref 0.5–1.4)
DIFFERENTIAL METHOD BLD: ABNORMAL
EOSINOPHIL # BLD AUTO: 0.1 K/UL (ref 0–0.5)
EOSINOPHIL NFR BLD: 3.9 % (ref 0–8)
ERYTHROCYTE [DISTWIDTH] IN BLOOD BY AUTOMATED COUNT: 15 % (ref 11.5–14.5)
EST. GFR  (NO RACE VARIABLE): >60 ML/MIN/1.73 M^2
GLUCOSE SERPL-MCNC: 82 MG/DL (ref 70–110)
HCT VFR BLD AUTO: 26.1 % (ref 40–54)
HGB BLD-MCNC: 8.3 G/DL (ref 14–18)
HYPOCHROMIA BLD QL SMEAR: ABNORMAL
IMM GRANULOCYTES # BLD AUTO: 0 K/UL (ref 0–0.04)
IMM GRANULOCYTES NFR BLD AUTO: 0 % (ref 0–0.5)
LYMPHOCYTES # BLD AUTO: 0.3 K/UL (ref 1–4.8)
LYMPHOCYTES NFR BLD: 20.9 % (ref 18–48)
MAGNESIUM SERPL-MCNC: 1.5 MG/DL (ref 1.6–2.6)
MCH RBC QN AUTO: 27.5 PG (ref 27–31)
MCHC RBC AUTO-ENTMCNC: 31.8 G/DL (ref 32–36)
MCV RBC AUTO: 86 FL (ref 82–98)
MONOCYTES # BLD AUTO: 0.1 K/UL (ref 0.3–1)
MONOCYTES NFR BLD: 10.1 % (ref 4–15)
NEUTROPHILS # BLD AUTO: 0.8 K/UL (ref 1.8–7.7)
NEUTROPHILS NFR BLD: 64.3 % (ref 38–73)
NRBC BLD-RTO: 0 /100 WBC
OVALOCYTES BLD QL SMEAR: ABNORMAL
PLATELET # BLD AUTO: 67 K/UL (ref 150–450)
PLATELET BLD QL SMEAR: ABNORMAL
PMV BLD AUTO: 12.8 FL (ref 9.2–12.9)
POTASSIUM SERPL-SCNC: 3.7 MMOL/L (ref 3.5–5.1)
PROT SERPL-MCNC: 5.4 G/DL (ref 6–8.4)
RBC # BLD AUTO: 3.02 M/UL (ref 4.6–6.2)
SODIUM SERPL-SCNC: 137 MMOL/L (ref 136–145)
WBC # BLD AUTO: 1.29 K/UL (ref 3.9–12.7)

## 2025-01-19 PROCEDURE — 25000003 PHARM REV CODE 250

## 2025-01-19 PROCEDURE — 80053 COMPREHEN METABOLIC PANEL: CPT

## 2025-01-19 PROCEDURE — 83735 ASSAY OF MAGNESIUM: CPT

## 2025-01-19 PROCEDURE — 25000003 PHARM REV CODE 250: Performed by: INTERNAL MEDICINE

## 2025-01-19 PROCEDURE — 85025 COMPLETE CBC W/AUTO DIFF WBC: CPT

## 2025-01-19 PROCEDURE — 63600175 PHARM REV CODE 636 W HCPCS

## 2025-01-19 PROCEDURE — 36415 COLL VENOUS BLD VENIPUNCTURE: CPT

## 2025-01-19 RX ORDER — CEPHALEXIN 250 MG/1
250 CAPSULE ORAL 4 TIMES DAILY
Qty: 20 CAPSULE | Refills: 0 | Status: SHIPPED | OUTPATIENT
Start: 2025-01-19 | End: 2025-01-24

## 2025-01-19 RX ORDER — CEPHALEXIN 250 MG/1
250 CAPSULE ORAL 4 TIMES DAILY
Qty: 20 CAPSULE | Refills: 0 | Status: SHIPPED | OUTPATIENT
Start: 2025-01-19 | End: 2025-01-19

## 2025-01-19 RX ADMIN — CEFTRIAXONE 1 G: 1 INJECTION, POWDER, FOR SOLUTION INTRAMUSCULAR; INTRAVENOUS at 06:01

## 2025-01-19 RX ADMIN — Medication 800 MG: at 11:01

## 2025-01-19 RX ADMIN — METHADONE HYDROCHLORIDE 110 MG: 10 TABLET ORAL at 11:01

## 2025-01-19 NOTE — NURSING
Discharge instructions reviewed with pt.  PIV's removed without difficulty, catheter intact.  Pt left unit in stable condition via wheelchair to [place] cab.

## 2025-01-19 NOTE — PLAN OF CARE
Patient cleared for discharge from case management standpoint.     called Pt's PCP office to schedule Pt follow-up appointment; office closed at time    Chart and discharge orders reviewed.  Patient discharged home with no further case management needs.       01/19/25 1053   Final Note   Assessment Type Final Discharge Note   Anticipated Discharge Disposition Home   Post-Acute Status   Discharge Delays None known at this time

## 2025-01-19 NOTE — DISCHARGE SUMMARY
[facility] Medicine  Discharge Summary      Patient Name: Stevie Cristina  MRN: [id number]  ASHLEY: [id number]  Patient Class: IP- Inpatient  Admission Date: 1/17/2025  Hospital Length of Stay: 2 days  Discharge Date and Time:  01/19/2025 10:56 AM  Attending Physician: Jennifer Crowley MD   Discharging Provider: Jennifer Crowley MD  Primary Care Provider: [facility]    Primary Care Team: Networked reference to record PCT     HPI:   Very pleasant 69-year-old gentleman with history of Crohn's disease no longer follows with gastroenterologist he states that all of his previous provider so since passed away.  He does not take any disease modifying agents for this.  Who presents with complaints of nausea and emesis of 1 day duration.  He denies any subjective fever, chills.  Abdominal pain is felt to be mild.  He states that his distention on exam is at his baseline.  Imaging consistent partial small-bowel, however, he is passing stool.  We will admit for partial small bowel obstruction.  No indication for NG tube, bowel rest, antiemetics and IV fluids.  Additionally, patient is found to have a UTI, following urine culture and continuing Rocephin.    * No surgery found *      Hospital Course:   Patient is a 69 year old male with previous history of crohn's disease, admitted with partial SBO and UTI. SBO resolved spontaneously. Patient was started on liquid diet. Patient tolerating diet and advanced to soft low fiber diet. Urine culture is growing MSSA, patient received Rocephin and will DC with 5 more days of Keflex. Patient was found to be leukopenic, which seems to be chronic looking back at the labs from 2023. CBC will be repeated at PCP appt in a week.      Goals of Care Treatment Preferences:  Code Status: Full Code    Physical Exam  Constitutional:       General: He is not in acute distress.     Appearance: Normal appearance. He is not  toxic-appearing.   HENT:      Head: Normocephalic and atraumatic.      Nose: No rhinorrhea.   Eyes:      Extraocular Movements: Extraocular movements intact.   Cardiovascular:      Rate and Rhythm: Normal rate.   Pulmonary:      Effort: Pulmonary effort is normal. No respiratory distress.      Breath sounds: Normal breath sounds. No wheezing or rales.   Abdominal:      Tenderness: There is no abdominal tenderness. There is no guarding or rebound.   Musculoskeletal:      Right lower leg: No edema.      Left lower leg: No edema.   Skin:     General: Skin is warm and dry.   Neurological:      General: No focal deficit present.      Mental Status: He is oriented to person, place, and time.   Psychiatric:         Mood and Affect: Mood normal.         Behavior: Behavior normal.        SDOH Screening:  The patient was screened for utility difficulties, food insecurity, transport difficulties, housing insecurity, and interpersonal safety and there were no concerns identified this admission.     Consults:     * Partial small bowel obstruction  Resolved      Pulmonary nodule  Incidental finding, will need repeat CT in 3 months with PCP      Cystitis  Urine culture MSSA  Received two days of Rocephin, DC with 5 more days of Keflex           Final Active Diagnoses:    Diagnosis Date Noted POA    PRINCIPAL PROBLEM:  Partial small bowel obstruction [K56.600] 02/13/2021 Yes    Cystitis [N30.90] 01/17/2025 Yes    Pulmonary nodule [R91.1] 01/17/2025 Yes      Problems Resolved During this Admission:       Discharged Condition: good    Disposition: Home or Self Care    Follow Up:   Follow-up Information       [facility]    Contact information:  [place] [id number] [phone]                           Patient Instructions:      CBC auto differential   Standing Status: Future Standing Exp. Date: 04/19/26       Significant Diagnostic Studies: Labs: CMP    Recent Labs   Lab 01/18/25  0723 01/19/25  0614    137   K 3.7 3.7    103   CO2 29 29   GLU 64* 82   BUN 9 8   CREATININE 0.8 0.7   CALCIUM 7.9* 8.0*   PROT 5.5* 5.4*   ALBUMIN 2.8* 2.8*   BILITOT 1.1* 0.9   ALKPHOS 63 59   AST 17 15   ALT 9* 8*   ANIONGAP 4* 5*    and CBC   Recent Labs   Lab 01/18/25 0723 01/19/25  0614   WBC 1.36* 1.29*   HGB 8.4* 8.3*   HCT 27.6* 26.1*   PLT 84* 67*       Pending Diagnostic Studies:       Procedure Component Value Units Date/Time    HIV 1/2 Ag/Ab (4th Gen) [[id number]] Collected: 01/17/25 0341    Order Status: Sent Lab Status: No result     Specimen: Blood     Hepatitis C Antibody [[id number]] Collected: 01/17/25 0341    Order Status: Sent Lab Status: No result     Specimen: Blood            Medications:  Reconciled Home Medications:      Medication List        START taking these medications      cephALEXin 250 MG capsule  Commonly known as: KEFLEX  Take 1 capsule (250 mg total) by mouth 4 (four) times daily. for 5 days            CONTINUE taking these medications      METHADONE ORAL  Take 1 tablet by mouth once daily.              Indwelling Lines/Drains at time of discharge:   Lines/Drains/Airways       None                   Time spent on the discharge of patient: 40 minutes         Jennifer Crowley MD  Department of Hospital Medicine  [facility]

## 2025-01-20 LAB
OHS QRS DURATION: 126 MS
OHS QTC CALCULATION: 472 MS

## 2025-06-01 ENCOUNTER — HOSPITAL ENCOUNTER (EMERGENCY)
Facility: HOSPITAL | Age: 70
Discharge: HOME | End: 2025-06-02
Attending: EMERGENCY MEDICINE
Payer: MEDICARE

## 2025-06-01 DIAGNOSIS — M79.89 LEG SWELLING: ICD-10-CM

## 2025-06-01 DIAGNOSIS — T14.8XXA CHRONIC WOUND: ICD-10-CM

## 2025-06-01 DIAGNOSIS — D61.818 PANCYTOPENIA: Primary | ICD-10-CM

## 2025-06-01 LAB
ALBUMIN SERPL-MCNC: 3.4 G/DL (ref 3.5–5.2)
ALP SERPL-CCNC: 86 UNIT/L (ref 55–135)
ALT SERPL-CCNC: 10 UNIT/L (ref 10–44)
ANION GAP (SMH): 5 MMOL/L (ref 8–16)
AST SERPL-CCNC: 20 UNIT/L (ref 10–40)
BILIRUB SERPL-MCNC: 1 MG/DL (ref 0.1–1)
BNP SERPL-MCNC: 149 PG/ML
BUN SERPL-MCNC: 9 MG/DL (ref 8–23)
C-REACTIVE PROTEIN (SMH): 0.3 MG/DL
CALCIUM SERPL-MCNC: 8.4 MG/DL (ref 8.7–10.5)
CHLORIDE SERPL-SCNC: 106 MMOL/L (ref 95–110)
CO2 SERPL-SCNC: 29 MMOL/L (ref 23–29)
CREAT SERPL-MCNC: 0.8 MG/DL (ref 0.5–1.4)
ERYTHROCYTE [DISTWIDTH] IN BLOOD BY AUTOMATED COUNT: 16.6 % (ref 11.5–14.5)
ERYTHROCYTE [SEDIMENTATION RATE] IN BLOOD: 9 MM/HR (ref 0–10)
GFR SERPLBLD CREATININE-BSD FMLA CKD-EPI: >60 ML/MIN/1.73/M2
GLUCOSE SERPL-MCNC: 95 MG/DL (ref 70–110)
HCT VFR BLD AUTO: 27.6 % (ref 40–54)
HGB BLD-MCNC: 8.2 GM/DL (ref 14–18)
MCH RBC QN AUTO: 23.8 PG (ref 27–31)
MCHC RBC AUTO-ENTMCNC: 29.7 G/DL (ref 32–36)
MCV RBC AUTO: 80 FL (ref 82–98)
NUCLEATED RBC (/100WBC) (SMH): 0 /100 WBC
PLATELET # BLD AUTO: 105 K/UL (ref 150–450)
PMV BLD AUTO: ABNORMAL FL
POTASSIUM SERPL-SCNC: 3.3 MMOL/L (ref 3.5–5.1)
PROT SERPL-MCNC: 6.9 GM/DL (ref 6–8.4)
RBC # BLD AUTO: 3.44 M/UL (ref 4.6–6.2)
SODIUM SERPL-SCNC: 140 MMOL/L (ref 136–145)
WBC # BLD AUTO: 1.71 K/UL (ref 3.9–12.7)

## 2025-06-01 PROCEDURE — 86803 HEPATITIS C AB TEST: CPT | Performed by: EMERGENCY MEDICINE

## 2025-06-01 PROCEDURE — 87389 HIV-1 AG W/HIV-1&-2 AB AG IA: CPT | Performed by: EMERGENCY MEDICINE

## 2025-06-01 PROCEDURE — 86140 C-REACTIVE PROTEIN: CPT | Performed by: STUDENT IN AN ORGANIZED HEALTH CARE EDUCATION/TRAINING PROGRAM

## 2025-06-01 PROCEDURE — 93010 ELECTROCARDIOGRAM REPORT: CPT | Mod: ,,, | Performed by: INTERNAL MEDICINE

## 2025-06-01 PROCEDURE — 93005 ELECTROCARDIOGRAM TRACING: CPT | Performed by: INTERNAL MEDICINE

## 2025-06-01 PROCEDURE — 25000003 PHARM REV CODE 250: Performed by: STUDENT IN AN ORGANIZED HEALTH CARE EDUCATION/TRAINING PROGRAM

## 2025-06-01 PROCEDURE — 99285 EMERGENCY DEPT VISIT HI MDM: CPT | Mod: 25

## 2025-06-01 PROCEDURE — 85651 RBC SED RATE NONAUTOMATED: CPT | Performed by: STUDENT IN AN ORGANIZED HEALTH CARE EDUCATION/TRAINING PROGRAM

## 2025-06-01 PROCEDURE — 85045 AUTOMATED RETICULOCYTE COUNT: CPT | Performed by: STUDENT IN AN ORGANIZED HEALTH CARE EDUCATION/TRAINING PROGRAM

## 2025-06-01 PROCEDURE — 87522 HEPATITIS C REVRS TRNSCRPJ: CPT | Performed by: EMERGENCY MEDICINE

## 2025-06-01 PROCEDURE — 80053 COMPREHEN METABOLIC PANEL: CPT | Performed by: STUDENT IN AN ORGANIZED HEALTH CARE EDUCATION/TRAINING PROGRAM

## 2025-06-01 PROCEDURE — 83880 ASSAY OF NATRIURETIC PEPTIDE: CPT | Performed by: STUDENT IN AN ORGANIZED HEALTH CARE EDUCATION/TRAINING PROGRAM

## 2025-06-01 PROCEDURE — 85025 COMPLETE CBC W/AUTO DIFF WBC: CPT | Performed by: STUDENT IN AN ORGANIZED HEALTH CARE EDUCATION/TRAINING PROGRAM

## 2025-06-01 RX ADMIN — POTASSIUM BICARBONATE 20 MEQ: 782 TABLET, EFFERVESCENT ORAL at 11:06

## 2025-06-02 VITALS
RESPIRATION RATE: 16 BRPM | WEIGHT: 200 LBS | HEART RATE: 88 BPM | BODY MASS INDEX: 26.51 KG/M2 | HEIGHT: 73 IN | SYSTOLIC BLOOD PRESSURE: 142 MMHG | TEMPERATURE: 98 F | DIASTOLIC BLOOD PRESSURE: 70 MMHG | OXYGEN SATURATION: 95 %

## 2025-06-02 LAB
ANISOCYTOSIS BLD QL SMEAR: SLIGHT
EOSINOPHIL NFR BLD MANUAL: 2 % (ref 0–8)
HCV AB SERPL QL IA: REACTIVE
HIV 1+2 AB+HIV1 P24 AG SERPL QL IA: NORMAL
LYMPHOCYTES NFR BLD MANUAL: 13 % (ref 18–48)
MONOCYTES NFR BLD MANUAL: 9 % (ref 4–15)
NEUTROPHILS NFR BLD MANUAL: 76 % (ref 38–73)
PLATELET BLD QL SMEAR: ABNORMAL
RETICS/RBC NFR AUTO: 1 % (ref 0.4–2)

## 2025-06-03 ENCOUNTER — RESULTS FOLLOW-UP (OUTPATIENT)
Dept: EMERGENCY MEDICINE | Facility: HOSPITAL | Age: 70
End: 2025-06-03

## 2025-06-03 ENCOUNTER — TELEPHONE (OUTPATIENT)
Facility: CLINIC | Age: 70
End: 2025-06-03
Payer: MEDICAID

## 2025-06-04 ENCOUNTER — TELEPHONE (OUTPATIENT)
Facility: CLINIC | Age: 70
End: 2025-06-04
Payer: MEDICAID

## 2025-06-04 LAB — HCV RNA SERPL NAA+PROBE-ACNC: NORMAL IU/ML

## 2025-06-07 LAB
OHS QRS DURATION: 138 MS
OHS QTC CALCULATION: 500 MS

## 2025-07-11 ENCOUNTER — HOSPITAL ENCOUNTER (INPATIENT)
Facility: HOSPITAL | Age: 70
LOS: 7 days | Discharge: HOME | DRG: 871 | End: 2025-07-19
Attending: EMERGENCY MEDICINE
Payer: MEDICARE

## 2025-07-11 DIAGNOSIS — Z13.6 SCREENING FOR CARDIOVASCULAR CONDITION: ICD-10-CM

## 2025-07-11 DIAGNOSIS — R07.9 CHEST PAIN: ICD-10-CM

## 2025-07-11 DIAGNOSIS — R65.20 SEVERE SEPSIS: ICD-10-CM

## 2025-07-11 DIAGNOSIS — M79.606 LEG PAIN: ICD-10-CM

## 2025-07-11 DIAGNOSIS — A41.9 SEVERE SEPSIS: ICD-10-CM

## 2025-07-11 DIAGNOSIS — R41.82 ALTERED MENTAL STATUS, UNSPECIFIED ALTERED MENTAL STATUS TYPE: Primary | ICD-10-CM

## 2025-07-11 DIAGNOSIS — J18.9 PNEUMONIA: ICD-10-CM

## 2025-07-11 DIAGNOSIS — J18.9 PNEUMONIA DUE TO INFECTIOUS ORGANISM, UNSPECIFIED LATERALITY, UNSPECIFIED PART OF LUNG: ICD-10-CM

## 2025-07-11 DIAGNOSIS — L03.116 LEFT LEG CELLULITIS: ICD-10-CM

## 2025-07-11 DIAGNOSIS — R50.9 FEVER, UNSPECIFIED FEVER CAUSE: ICD-10-CM

## 2025-07-11 PROBLEM — F11.20 METHADONE DEPENDENCE: Status: ACTIVE | Noted: 2025-07-11

## 2025-07-11 PROBLEM — K50.90 CROHN'S DISEASE: Status: ACTIVE | Noted: 2025-07-11

## 2025-07-11 PROBLEM — S81.802A OPEN WOUND OF LEFT LOWER LEG: Status: ACTIVE | Noted: 2025-07-11

## 2025-07-11 PROBLEM — I21.4 NSTEMI (NON-ST ELEVATED MYOCARDIAL INFARCTION): Status: ACTIVE | Noted: 2025-07-11

## 2025-07-11 LAB
ABSOLUTE EOSINOPHIL (SMH): 0.02 K/UL
ABSOLUTE MONOCYTE (SMH): 0.26 K/UL (ref 0.3–1)
ABSOLUTE NEUTROPHIL COUNT (SMH): 4.8 K/UL (ref 1.8–7.7)
ALBUMIN SERPL-MCNC: 3.2 G/DL (ref 3.5–5.2)
ALP SERPL-CCNC: 112 UNIT/L (ref 55–135)
ALT SERPL-CCNC: 25 UNIT/L (ref 10–44)
ANION GAP (SMH): 6 MMOL/L (ref 8–16)
AST SERPL-CCNC: 69 UNIT/L (ref 10–40)
BASOPHILS # BLD AUTO: 0.01 K/UL
BASOPHILS NFR BLD AUTO: 0.2 %
BILIRUB SERPL-MCNC: 1 MG/DL (ref 0.1–1)
BILIRUB UR QL STRIP.AUTO: NEGATIVE
BNP SERPL-MCNC: 136 PG/ML
BUN SERPL-MCNC: 10 MG/DL (ref 8–23)
CALCIUM SERPL-MCNC: 8 MG/DL (ref 8.7–10.5)
CHLORIDE SERPL-SCNC: 104 MMOL/L (ref 95–110)
CLARITY UR: CLEAR
CO2 SERPL-SCNC: 27 MMOL/L (ref 23–29)
COLOR UR AUTO: YELLOW
CREAT SERPL-MCNC: 0.7 MG/DL (ref 0.5–1.4)
ERYTHROCYTE [DISTWIDTH] IN BLOOD BY AUTOMATED COUNT: 17.4 % (ref 11.5–14.5)
GFR SERPLBLD CREATININE-BSD FMLA CKD-EPI: >60 ML/MIN/1.73/M2
GLUCOSE SERPL-MCNC: 95 MG/DL (ref 70–110)
GLUCOSE UR QL STRIP: NEGATIVE
HCT VFR BLD AUTO: 28.3 % (ref 40–54)
HGB BLD-MCNC: 8.5 GM/DL (ref 14–18)
HGB UR QL STRIP: NEGATIVE
IMM GRANULOCYTES # BLD AUTO: 0.02 K/UL (ref 0–0.04)
IMM GRANULOCYTES NFR BLD AUTO: 0.4 % (ref 0–0.5)
INFLUENZA A MOLECULAR (OHS): NEGATIVE
INFLUENZA B MOLECULAR (OHS): NEGATIVE
KETONES UR QL STRIP: NEGATIVE
LDH SERPL L TO P-CCNC: 2.03 MMOL/L (ref 0.5–2.2)
LEUKOCYTE ESTERASE UR QL STRIP: NEGATIVE
LIPASE SERPL-CCNC: 58 U/L (ref 4–60)
LYMPHOCYTES # BLD AUTO: 0.15 K/UL (ref 1–4.8)
MAGNESIUM SERPL-MCNC: 1.4 MG/DL (ref 1.6–2.6)
MAGNESIUM SERPL-MCNC: 1.4 MG/DL (ref 1.6–2.6)
MCH RBC QN AUTO: 23.4 PG (ref 27–31)
MCHC RBC AUTO-ENTMCNC: 30 G/DL (ref 32–36)
MCV RBC AUTO: 78 FL (ref 82–98)
NITRITE UR QL STRIP: NEGATIVE
NUCLEATED RBC (/100WBC) (SMH): 0 /100 WBC
PH UR STRIP: 7 [PH]
PHOSPHATE SERPL-MCNC: 1.6 MG/DL (ref 2.7–4.5)
PLATELET # BLD AUTO: 178 K/UL (ref 150–450)
PMV BLD AUTO: 11.8 FL (ref 9.2–12.9)
POTASSIUM SERPL-SCNC: 3.8 MMOL/L (ref 3.5–5.1)
PROCALCITONIN SERPL-MCNC: 3.78 NG/ML
PROT SERPL-MCNC: 6.5 GM/DL (ref 6–8.4)
PROT UR QL STRIP: NEGATIVE
RBC # BLD AUTO: 3.64 M/UL (ref 4.6–6.2)
RELATIVE EOSINOPHIL (SMH): 0.4 % (ref 0–8)
RELATIVE LYMPHOCYTE (SMH): 2.9 % (ref 18–48)
RELATIVE MONOCYTE (SMH): 4.9 % (ref 4–15)
RELATIVE NEUTROPHIL (SMH): 91.2 % (ref 38–73)
SAMPLE: NORMAL
SARS-COV-2 RDRP RESP QL NAA+PROBE: NEGATIVE
SODIUM SERPL-SCNC: 137 MMOL/L (ref 136–145)
SP GR UR STRIP: 1.01
TROPONIN HIGH SENSITIVE (SMH): 354.2 PG/ML
TROPONIN HIGH SENSITIVE (SMH): 368.3 PG/ML
UROBILINOGEN UR STRIP-ACNC: NEGATIVE EU/DL
WBC # BLD AUTO: 5.26 K/UL (ref 3.9–12.7)

## 2025-07-11 PROCEDURE — 96372 THER/PROPH/DIAG INJ SC/IM: CPT | Performed by: INTERNAL MEDICINE

## 2025-07-11 PROCEDURE — 99900031 HC PATIENT EDUCATION (STAT)

## 2025-07-11 PROCEDURE — 83735 ASSAY OF MAGNESIUM: CPT | Performed by: EMERGENCY MEDICINE

## 2025-07-11 PROCEDURE — 87502 INFLUENZA DNA AMP PROBE: CPT | Performed by: EMERGENCY MEDICINE

## 2025-07-11 PROCEDURE — 83690 ASSAY OF LIPASE: CPT | Performed by: EMERGENCY MEDICINE

## 2025-07-11 PROCEDURE — 25500020 PHARM REV CODE 255: Performed by: EMERGENCY MEDICINE

## 2025-07-11 PROCEDURE — 84145 PROCALCITONIN (PCT): CPT | Performed by: EMERGENCY MEDICINE

## 2025-07-11 PROCEDURE — 84484 ASSAY OF TROPONIN QUANT: CPT | Mod: 91 | Performed by: INTERNAL MEDICINE

## 2025-07-11 PROCEDURE — 84100 ASSAY OF PHOSPHORUS: CPT | Performed by: NURSE PRACTITIONER

## 2025-07-11 PROCEDURE — 87040 BLOOD CULTURE FOR BACTERIA: CPT | Performed by: NURSE PRACTITIONER

## 2025-07-11 PROCEDURE — 99291 CRITICAL CARE FIRST HOUR: CPT

## 2025-07-11 PROCEDURE — 93010 ELECTROCARDIOGRAM REPORT: CPT | Mod: ,,, | Performed by: GENERAL PRACTICE

## 2025-07-11 PROCEDURE — 83735 ASSAY OF MAGNESIUM: CPT | Mod: 59 | Performed by: NURSE PRACTITIONER

## 2025-07-11 PROCEDURE — 36415 COLL VENOUS BLD VENIPUNCTURE: CPT | Performed by: INTERNAL MEDICINE

## 2025-07-11 PROCEDURE — 25000003 PHARM REV CODE 250: Performed by: INTERNAL MEDICINE

## 2025-07-11 PROCEDURE — 63600175 PHARM REV CODE 636 W HCPCS: Performed by: EMERGENCY MEDICINE

## 2025-07-11 PROCEDURE — 94761 N-INVAS EAR/PLS OXIMETRY MLT: CPT

## 2025-07-11 PROCEDURE — 84484 ASSAY OF TROPONIN QUANT: CPT | Performed by: EMERGENCY MEDICINE

## 2025-07-11 PROCEDURE — 85025 COMPLETE CBC W/AUTO DIFF WBC: CPT | Performed by: NURSE PRACTITIONER

## 2025-07-11 PROCEDURE — 83880 ASSAY OF NATRIURETIC PEPTIDE: CPT | Performed by: EMERGENCY MEDICINE

## 2025-07-11 PROCEDURE — U0002 COVID-19 LAB TEST NON-CDC: HCPCS | Performed by: EMERGENCY MEDICINE

## 2025-07-11 PROCEDURE — 93005 ELECTROCARDIOGRAM TRACING: CPT | Performed by: GENERAL PRACTICE

## 2025-07-11 PROCEDURE — G0378 HOSPITAL OBSERVATION PER HR: HCPCS

## 2025-07-11 PROCEDURE — 96366 THER/PROPH/DIAG IV INF ADDON: CPT

## 2025-07-11 PROCEDURE — 81003 URINALYSIS AUTO W/O SCOPE: CPT | Performed by: NURSE PRACTITIONER

## 2025-07-11 PROCEDURE — 96361 HYDRATE IV INFUSION ADD-ON: CPT

## 2025-07-11 PROCEDURE — 63600175 PHARM REV CODE 636 W HCPCS: Performed by: INTERNAL MEDICINE

## 2025-07-11 PROCEDURE — 96365 THER/PROPH/DIAG IV INF INIT: CPT | Mod: 59

## 2025-07-11 PROCEDURE — 25000003 PHARM REV CODE 250: Performed by: EMERGENCY MEDICINE

## 2025-07-11 PROCEDURE — 99900035 HC TECH TIME PER 15 MIN (STAT)

## 2025-07-11 PROCEDURE — 80053 COMPREHEN METABOLIC PANEL: CPT | Performed by: NURSE PRACTITIONER

## 2025-07-11 PROCEDURE — 36415 COLL VENOUS BLD VENIPUNCTURE: CPT | Performed by: NURSE PRACTITIONER

## 2025-07-11 RX ORDER — MAGNESIUM SULFATE HEPTAHYDRATE 40 MG/ML
2 INJECTION, SOLUTION INTRAVENOUS ONCE
Status: COMPLETED | OUTPATIENT
Start: 2025-07-11 | End: 2025-07-11

## 2025-07-11 RX ORDER — ENOXAPARIN SODIUM 100 MG/ML
40 INJECTION SUBCUTANEOUS EVERY 24 HOURS
Status: DISCONTINUED | OUTPATIENT
Start: 2025-07-11 | End: 2025-07-19 | Stop reason: HOSPADM

## 2025-07-11 RX ORDER — TALC
6 POWDER (GRAM) TOPICAL NIGHTLY PRN
Status: DISCONTINUED | OUTPATIENT
Start: 2025-07-11 | End: 2025-07-19 | Stop reason: HOSPADM

## 2025-07-11 RX ORDER — ALUMINUM HYDROXIDE, MAGNESIUM HYDROXIDE, AND SIMETHICONE 1200; 120; 1200 MG/30ML; MG/30ML; MG/30ML
30 SUSPENSION ORAL 4 TIMES DAILY PRN
Status: DISCONTINUED | OUTPATIENT
Start: 2025-07-11 | End: 2025-07-19 | Stop reason: HOSPADM

## 2025-07-11 RX ORDER — SODIUM,POTASSIUM PHOSPHATES 280-250MG
2 POWDER IN PACKET (EA) ORAL
Status: DISCONTINUED | OUTPATIENT
Start: 2025-07-11 | End: 2025-07-19 | Stop reason: HOSPADM

## 2025-07-11 RX ORDER — ACETAMINOPHEN 325 MG/1
650 TABLET ORAL EVERY 8 HOURS PRN
Status: DISCONTINUED | OUTPATIENT
Start: 2025-07-11 | End: 2025-07-19 | Stop reason: HOSPADM

## 2025-07-11 RX ORDER — SODIUM CHLORIDE 9 MG/ML
INJECTION, SOLUTION INTRAVENOUS CONTINUOUS
Status: DISCONTINUED | OUTPATIENT
Start: 2025-07-11 | End: 2025-07-12

## 2025-07-11 RX ORDER — LANOLIN ALCOHOL/MO/W.PET/CERES
800 CREAM (GRAM) TOPICAL
Status: DISCONTINUED | OUTPATIENT
Start: 2025-07-11 | End: 2025-07-19 | Stop reason: HOSPADM

## 2025-07-11 RX ORDER — NALOXONE HCL 0.4 MG/ML
0.02 VIAL (ML) INJECTION
Status: DISCONTINUED | OUTPATIENT
Start: 2025-07-11 | End: 2025-07-19 | Stop reason: HOSPADM

## 2025-07-11 RX ORDER — HYDROMORPHONE HYDROCHLORIDE 1 MG/ML
0.5 INJECTION, SOLUTION INTRAMUSCULAR; INTRAVENOUS; SUBCUTANEOUS EVERY 6 HOURS PRN
Refills: 0 | Status: DISCONTINUED | OUTPATIENT
Start: 2025-07-11 | End: 2025-07-19 | Stop reason: HOSPADM

## 2025-07-11 RX ORDER — ACETAMINOPHEN 325 MG/1
650 TABLET ORAL EVERY 4 HOURS PRN
Status: DISCONTINUED | OUTPATIENT
Start: 2025-07-11 | End: 2025-07-19 | Stop reason: HOSPADM

## 2025-07-11 RX ORDER — ONDANSETRON HYDROCHLORIDE 2 MG/ML
4 INJECTION, SOLUTION INTRAVENOUS EVERY 6 HOURS PRN
Status: DISCONTINUED | OUTPATIENT
Start: 2025-07-11 | End: 2025-07-19 | Stop reason: HOSPADM

## 2025-07-11 RX ORDER — PANTOPRAZOLE SODIUM 40 MG/1
40 TABLET, DELAYED RELEASE ORAL EVERY MORNING
Status: DISCONTINUED | OUTPATIENT
Start: 2025-07-12 | End: 2025-07-19 | Stop reason: HOSPADM

## 2025-07-11 RX ORDER — ENOXAPARIN SODIUM 100 MG/ML
40 INJECTION SUBCUTANEOUS EVERY 24 HOURS
Status: DISCONTINUED | OUTPATIENT
Start: 2025-07-11 | End: 2025-07-11

## 2025-07-11 RX ORDER — ACETAMINOPHEN 325 MG/1
650 TABLET ORAL
Status: COMPLETED | OUTPATIENT
Start: 2025-07-11 | End: 2025-07-11

## 2025-07-11 RX ADMIN — PIPERACILLIN SODIUM AND TAZOBACTAM SODIUM 4.5 G: 4; .5 INJECTION, POWDER, LYOPHILIZED, FOR SOLUTION INTRAVENOUS at 01:07

## 2025-07-11 RX ADMIN — ENOXAPARIN SODIUM 40 MG: 40 INJECTION SUBCUTANEOUS at 08:07

## 2025-07-11 RX ADMIN — ACETAMINOPHEN 650 MG: 325 TABLET ORAL at 01:07

## 2025-07-11 RX ADMIN — PIPERACILLIN SODIUM AND TAZOBACTAM SODIUM 4.5 G: 4; .5 INJECTION, POWDER, LYOPHILIZED, FOR SOLUTION INTRAVENOUS at 08:07

## 2025-07-11 RX ADMIN — IOHEXOL 100 ML: 350 INJECTION, SOLUTION INTRAVENOUS at 03:07

## 2025-07-11 RX ADMIN — VANCOMYCIN HYDROCHLORIDE 2000 MG: 2 INJECTION, POWDER, LYOPHILIZED, FOR SOLUTION INTRAVENOUS at 03:07

## 2025-07-11 RX ADMIN — SODIUM CHLORIDE, POTASSIUM CHLORIDE, SODIUM LACTATE AND CALCIUM CHLORIDE 2721 ML: 600; 310; 30; 20 INJECTION, SOLUTION INTRAVENOUS at 01:07

## 2025-07-11 RX ADMIN — MAGNESIUM SULFATE IN WATER 2 G: 40 INJECTION, SOLUTION INTRAVENOUS at 03:07

## 2025-07-11 RX ADMIN — SODIUM CHLORIDE: 9 INJECTION, SOLUTION INTRAVENOUS at 08:07

## 2025-07-11 NOTE — HPI
70 year old pt getting admitted with severe sepsis/pneumonia  Pt overall is a poor historian and was not confused when saw in ER  He said he started having Nausea/Vomiting/diarrhea to ER physician but denied this c/o to me  He said he only had fever/chills/cough  Imaging showed evidence of pneumonia   His troponin levels were elevated but denied chest pain  Pt will be admitted under observation status as per  instruction

## 2025-07-11 NOTE — ASSESSMENT & PLAN NOTE
Aware      Patient has given consent to record this visit for documentation in their clinical record.  Health Maintenance Due   Topic Date Due   • Traditional Medicare- Medicare Wellness Visit  Never done   • COVID-19 Vaccine (6 - 2023-24 season) 09/01/2023   • Depression Screening  03/03/2024       Patient is due for topics as listed above but is not proceeding with Immunization(s) COVID-19 and MWV (Medicare Wellness Visit) at this time. Education provided for Immunization(s) COVID-19 and MWV (Medicare Wellness Visit).  HD Flu immunization given. Patient tolerated without incident. See immunization grid for documentation.

## 2025-07-11 NOTE — H&P
[facility] - Emergency Dept  Hospital Medicine  History & Physical    Patient Name: Stevie Cristina  MRN: [id number]  Patient Class: OP- Observation  Admission Date: 7/11/2025  Attending Physician: Dominik Perez MD   Primary Care Provider: [facility]         Patient information was obtained from patient, past medical records, ER records, and ER MD .     Subjective:     Principal Problem:Severe sepsis    Chief Complaint:   Chief Complaint   Patient presents with    Altered Mental Status     Last known normal around 3 yesterday, getting more altered, vomited, diarrhea with incontinence. Fever 103 oral temp on arrival        HPI: 70 year old pt getting admitted with severe sepsis/pneumonia  Pt overall is a poor historian and was not confused when saw in ER  He said he started having Nausea/Vomiting/diarrhea to ER physician but denied this c/o to me  He said he only had fever/chills/cough  Imaging showed evidence of pneumonia   His troponin levels were elevated but denied chest pain  Pt will be admitted under observation status as per  instruction     Past Medical History:   Diagnosis Date    Colon polyp     Crohn disease     Hard of hearing     Hepatitis C     Hepatitis C     Hypertension     TB lung, latent        Past Surgical History:   Procedure Laterality Date    ABDOMINAL SURGERY      BOWEL RESECTION    APPENDECTOMY      CHOLECYSTECTOMY         Review of patient's allergies indicates:  No Known Allergies    No current facility-administered medications on file prior to encounter.     Current Outpatient Medications on File Prior to Encounter   Medication Sig    methadone HCl (METHADONE ORAL) Take 1 tablet by mouth once daily.     Family History       Problem Relation (Age of Onset)    COPD Mother    Cancer Mother          Tobacco Use    Smoking status: Never    Smokeless tobacco: Never   Substance and Sexual Activity    Alcohol use: Never    Drug use: Never    Sexual activity:  Not on file     Review of Systems   Constitutional:  Positive for chills, fatigue and fever. Negative for activity change and appetite change.   HENT:  Negative for congestion and dental problem.    Eyes:  Negative for discharge and itching.   Respiratory:  Negative for shortness of breath.    Cardiovascular:  Negative for chest pain.   Gastrointestinal:  Negative for abdominal distention and abdominal pain.   Endocrine: Negative for cold intolerance.   Genitourinary:  Negative for difficulty urinating and dysuria.   Musculoskeletal:  Negative for arthralgias and back pain.   Skin:  Negative for color change.   Neurological:  Negative for dizziness and facial asymmetry.   Hematological:  Negative for adenopathy.   Psychiatric/Behavioral:  Negative for agitation and behavioral problems.      Objective:     Vital Signs (Most Recent):  Temp: 99.8 °F (37.7 °C) (07/11/25 1428)  Pulse: 78 (07/11/25 1700)  Resp: 18 (07/11/25 1401)  BP: (!) 113/55 (07/11/25 1700)  SpO2: 95 % (07/11/25 1700) Vital Signs (24h Range):  Temp:  [99.8 °F (37.7 °C)-103 °F (39.4 °C)] 99.8 °F (37.7 °C)  Pulse:  [] 78  Resp:  [18-20] 18  SpO2:  [95 %-96 %] 95 %  BP: (113-155)/(55-65) 113/55     Weight: 90.7 kg (200 lb)  Body mass index is 26.39 kg/m².     Physical Exam  Vitals and nursing note reviewed.   Constitutional:       Appearance: He is well-developed.   HENT:      Head: Atraumatic.      Right Ear: External ear normal.      Left Ear: External ear normal.      Nose: Nose normal.      Mouth/Throat:      Mouth: Mucous membranes are dry.   Eyes:      Extraocular Movements: Extraocular movements intact.   Cardiovascular:      Rate and Rhythm: Regular rhythm. Tachycardia present.   Pulmonary:      Effort: Pulmonary effort is normal.   Abdominal:      Palpations: Abdomen is soft.   Musculoskeletal:         General: Normal range of motion.      Cervical back: Full passive range of motion without pain and normal range of motion.   Skin:      "General: Skin is warm.      Comments: LLE wound   Neurological:      Mental Status: He is alert and oriented to person, place, and time.   Psychiatric:         Behavior: Behavior normal.                Significant Labs: All pertinent labs within the past 24 hours have been reviewed.  CBC:   Recent Labs   Lab 07/11/25  1256   WBC 5.26   HGB 8.5*   HCT 28.3*        CMP:   Recent Labs   Lab 07/11/25  1256      K 3.8      CO2 27   GLU 95   BUN 10   CREATININE 0.7   CALCIUM 8.0*   PROT 6.5   ALBUMIN 3.2*   BILITOT 1.0   ALKPHOS 112   AST 69*   ALT 25   ANIONGAP 6*       Significant Imaging: I have reviewed all pertinent imaging results/findings within the past 24 hours.  Assessment/Plan:     Assessment & Plan  Severe sepsis  POA  Source mainly pneumonia and has to r/o aspiration pneumonia  Has LLE wound and has to r/o septic source from here  Maintain iv abx  Iv fluids  Follow up cultures  If in need transfer to ICU     Current Antibiotics    vancomycin - pharmacy to dose, pharmacy to manage frequency, Intravenous  piperacillin-tazobactam (ZOSYN) 4.5 g in D5W 100 mL IVPB (MB+), Every 8 hours (non-standard times), Intravenous    Lactate  Recent Labs   Lab 07/11/25  1305   POCLAC 2.03     Acute pneumonia      Antibiotics (From admission, onward)      Start     Stop Route Frequency Ordered    07/11/25 2100  piperacillin-tazobactam (ZOSYN) 4.5 g in D5W 100 mL IVPB (MB+)         -- IV Every 8 hours (non-standard times) 07/11/25 1705    07/11/25 1359  vancomycin - pharmacy to dose  (vancomycin IVPB (PEDS and ADULTS))        Placed in "And" Linked Group    -- IV pharmacy to manage frequency 07/11/25 1259            Microbiology Results (last 7 days)       Procedure Component Value Units Date/Time    Influenza A & B by Molecular [[id number]]  (Normal) Collected: 07/11/25 1409    Order Status: Completed Specimen: Nasopharyngeal Swab Updated: 07/11/25 1435     INFLUENZA A MOLECULAR Negative     INFLUENZA B " MOLECULAR  Negative    Blood culture x two cultures. Draw prior to antibiotics [[id number]] Collected: 07/11/25 1254    Order Status: Sent Specimen: Blood from Peripheral, Forearm, Left Updated: 07/11/25 1309    Blood culture x two cultures. Draw prior to antibiotics [[id number]] Collected: 07/11/25 1259    Order Status: Sent Specimen: Blood from Peripheral, Hand, Left Updated: 07/11/25 1309    Blood culture x two cultures. Draw prior to antibiotics. [[id number]]     Order Status: Canceled Specimen: Blood     Blood culture x two cultures. Draw prior to antibiotics. [[id number]]     Order Status: Canceled Specimen: Blood           Crohn's disease  Aware     Open wound of left lower leg  Chronic issue for 2 years     Methadone dependence  Aware     NSTEMI (non-ST elevated myocardial infarction)  Mostly type 2 MI from sepsis  Trend troponin's  EKG reviewed  ECHO ordered  If trops keep going up need to be started on heparin drip and cardiology consult       VTE Risk Mitigation (From admission, onward)           Ordered     enoxaparin injection 40 mg  Daily         07/11/25 1704     IP VTE HIGH RISK PATIENT  Once         07/11/25 1704     Place sequential compression device  Until discontinued         07/11/25 1704                                   On 07/11/2025, patient should be placed in hospital observation services under my care.             Dominik Perez MD  Department of Hospital Medicine  [facility] - Emergency Dept

## 2025-07-11 NOTE — SUBJECTIVE & OBJECTIVE
Past Medical History:   Diagnosis Date    Colon polyp     Crohn disease     Hard of hearing     Hepatitis C     Hepatitis C     Hypertension     TB lung, latent        Past Surgical History:   Procedure Laterality Date    ABDOMINAL SURGERY      BOWEL RESECTION    APPENDECTOMY      CHOLECYSTECTOMY         Review of patient's allergies indicates:  No Known Allergies    No current facility-administered medications on file prior to encounter.     Current Outpatient Medications on File Prior to Encounter   Medication Sig    methadone HCl (METHADONE ORAL) Take 1 tablet by mouth once daily.     Family History       Problem Relation (Age of Onset)    COPD Mother    Cancer Mother          Tobacco Use    Smoking status: Never    Smokeless tobacco: Never   Substance and Sexual Activity    Alcohol use: Never    Drug use: Never    Sexual activity: Not on file     Review of Systems   Constitutional:  Positive for chills, fatigue and fever. Negative for activity change and appetite change.   HENT:  Negative for congestion and dental problem.    Eyes:  Negative for discharge and itching.   Respiratory:  Negative for shortness of breath.    Cardiovascular:  Negative for chest pain.   Gastrointestinal:  Negative for abdominal distention and abdominal pain.   Endocrine: Negative for cold intolerance.   Genitourinary:  Negative for difficulty urinating and dysuria.   Musculoskeletal:  Negative for arthralgias and back pain.   Skin:  Negative for color change.   Neurological:  Negative for dizziness and facial asymmetry.   Hematological:  Negative for adenopathy.   Psychiatric/Behavioral:  Negative for agitation and behavioral problems.      Objective:     Vital Signs (Most Recent):  Temp: 99.8 °F (37.7 °C) (07/11/25 1428)  Pulse: 78 (07/11/25 1700)  Resp: 18 (07/11/25 1401)  BP: (!) 113/55 (07/11/25 1700)  SpO2: 95 % (07/11/25 1700) Vital Signs (24h Range):  Temp:  [99.8 °F (37.7 °C)-103 °F (39.4 °C)] 99.8 °F (37.7 °C)  Pulse:   [] 78  Resp:  [18-20] 18  SpO2:  [95 %-96 %] 95 %  BP: (113-155)/(55-65) 113/55     Weight: 90.7 kg (200 lb)  Body mass index is 26.39 kg/m².     Physical Exam  Vitals and nursing note reviewed.   Constitutional:       Appearance: He is well-developed.   HENT:      Head: Atraumatic.      Right Ear: External ear normal.      Left Ear: External ear normal.      Nose: Nose normal.      Mouth/Throat:      Mouth: Mucous membranes are dry.   Eyes:      Extraocular Movements: Extraocular movements intact.   Cardiovascular:      Rate and Rhythm: Regular rhythm. Tachycardia present.   Pulmonary:      Effort: Pulmonary effort is normal.   Abdominal:      Palpations: Abdomen is soft.   Musculoskeletal:         General: Normal range of motion.      Cervical back: Full passive range of motion without pain and normal range of motion.   Skin:     General: Skin is warm.      Comments: LLE wound   Neurological:      Mental Status: He is alert and oriented to person, place, and time.   Psychiatric:         Behavior: Behavior normal.                Significant Labs: All pertinent labs within the past 24 hours have been reviewed.  CBC:   Recent Labs   Lab 07/11/25  1256   WBC 5.26   HGB 8.5*   HCT 28.3*        CMP:   Recent Labs   Lab 07/11/25  1256      K 3.8      CO2 27   GLU 95   BUN 10   CREATININE 0.7   CALCIUM 8.0*   PROT 6.5   ALBUMIN 3.2*   BILITOT 1.0   ALKPHOS 112   AST 69*   ALT 25   ANIONGAP 6*       Significant Imaging: I have reviewed all pertinent imaging results/findings within the past 24 hours.

## 2025-07-11 NOTE — ASSESSMENT & PLAN NOTE
"    Antibiotics (From admission, onward)      Start     Stop Route Frequency Ordered    07/11/25 2100  piperacillin-tazobactam (ZOSYN) 4.5 g in D5W 100 mL IVPB (MB+)         -- IV Every 8 hours (non-standard times) 07/11/25 1705    07/11/25 1359  vancomycin - pharmacy to dose  (vancomycin IVPB (PEDS and ADULTS))        Placed in "And" Linked Group    -- IV pharmacy to manage frequency 07/11/25 1259            Microbiology Results (last 7 days)       Procedure Component Value Units Date/Time    Influenza A & B by Molecular [[id number]]  (Normal) Collected: 07/11/25 1409    Order Status: Completed Specimen: Nasopharyngeal Swab Updated: 07/11/25 1435     INFLUENZA A MOLECULAR Negative     INFLUENZA B MOLECULAR  Negative    Blood culture x two cultures. Draw prior to antibiotics [[id number]] Collected: 07/11/25 1254    Order Status: Sent Specimen: Blood from Peripheral, Forearm, Left Updated: 07/11/25 1309    Blood culture x two cultures. Draw prior to antibiotics [[id number]] Collected: 07/11/25 1259    Order Status: Sent Specimen: Blood from Peripheral, Hand, Left Updated: 07/11/25 1309    Blood culture x two cultures. Draw prior to antibiotics. [[id number]]     Order Status: Canceled Specimen: Blood     Blood culture x two cultures. Draw prior to antibiotics. [[id number]]     Order Status: Canceled Specimen: Blood           "

## 2025-07-11 NOTE — ASSESSMENT & PLAN NOTE
POA  Source mainly pneumonia and has to r/o aspiration pneumonia  Has LLE wound and has to r/o septic source from here  Maintain iv abx  Iv fluids  Follow up cultures  If in need transfer to ICU     Current Antibiotics    vancomycin - pharmacy to dose, pharmacy to manage frequency, Intravenous  piperacillin-tazobactam (ZOSYN) 4.5 g in D5W 100 mL IVPB (MB+), Every 8 hours (non-standard times), Intravenous    Lactate  Recent Labs   Lab 07/11/25  1305   POCLAC 2.03

## 2025-07-11 NOTE — ED PROVIDER NOTES
Encounter Date: 7/11/2025       History     Chief Complaint   Patient presents with    Altered Mental Status     Last known normal around 3 yesterday, getting more altered, vomited, diarrhea with incontinence. Fever 103 oral temp on arrival     Patient presents with history of Crohn's disease and chronic wound to the left lower extremity complaining of confusion, fever that started yesterday and seemed to be getting worse this morning.  Upon arrival to the ER patient is confused.  Patient with nausea, vomiting, diarrhea today.  At the worst symptoms are moderate to severe.  Nothing makes it better or worse.      Review of patient's allergies indicates:  No Known Allergies  Past Medical History:   Diagnosis Date    Colon polyp     Crohn disease     Hard of hearing     Hepatitis C     Hepatitis C     Hypertension     TB lung, latent      Past Surgical History:   Procedure Laterality Date    ABDOMINAL SURGERY      BOWEL RESECTION    APPENDECTOMY      CHOLECYSTECTOMY       Family History   Problem Relation Name Age of Onset    COPD Mother      Cancer Mother       Social History[1]  Review of Systems   All other systems reviewed and are negative.      Physical Exam     Initial Vitals [07/11/25 1243]   BP Pulse Resp Temp SpO2   (!) 155/65 (!) 111 20 (!) 103 °F (39.4 °C) 95 %      MAP       --         Physical Exam    Nursing note and vitals reviewed.  Constitutional: He appears well-developed and well-nourished. He is not diaphoretic. No distress.   Pleasant, polite.   HENT:   Head: Normocephalic and atraumatic.   Eyes: EOM are normal.   Neck: Neck supple.   Normal range of motion.  Cardiovascular:  Normal rate, regular rhythm, normal heart sounds and intact distal pulses.           Pulmonary/Chest: No respiratory distress.   Abdominal: Abdomen is soft. He exhibits no distension. There is abdominal tenderness. There is no rebound.   Musculoskeletal:      Cervical back: Normal range of motion and neck supple.       Comments: Please see attached pictures in the chart.  There is chronic left lower extremity redness and wound present.     Neurological: He is alert.   Patient confused to person place and time on arrival but after receiving some resuscitation he now is oriented to person and place.  He is still confused on the year and to the president.   Skin: Skin is warm and dry.   Psychiatric: He has a normal mood and affect. His behavior is normal. Judgment and thought content normal.         ED Course   Procedures  Labs Reviewed   COMPREHENSIVE METABOLIC PANEL - Abnormal       Result Value    Sodium 137      Potassium 3.8      Chloride 104      CO2 27      Glucose 95      BUN 10      Creatinine 0.7      Calcium 8.0 (*)     Protein Total 6.5      Albumin 3.2 (*)     Bilirubin Total 1.0            AST 69 (*)     ALT 25      Anion Gap 6 (*)     eGFR >60     MAGNESIUM - Abnormal    Magnesium 1.4 (*)    PHOSPHORUS - Abnormal    Phosphorus Level 1.6 (*)    CBC WITH DIFFERENTIAL - Abnormal    WBC 5.26      RBC 3.64 (*)     Hgb 8.5 (*)     Hct 28.3 (*)     MCV 78 (*)     MCH 23.4 (*)     MCHC 30.0 (*)     RDW 17.4 (*)     Platelet Count 178      MPV 11.8      Nucleated RBC 0      Neut % 91.2 (*)     Lymph % 2.9 (*)     Mono % 4.9      Eos % 0.4      Basophil % 0.2      Imm Grans % 0.4      Neut # 4.8      Lymph # 0.15 (*)     Mono # 0.26 (*)     Eos # 0.02      Baso # 0.01      Imm Grans # 0.02     B-TYPE NATRIURETIC PEPTIDE - Abnormal     (*)    TROPONIN I HIGH SENSITIVITY - Abnormal    Troponin High Sensitive 368.3 (*)    PROCALCITONIN - Abnormal    Procalcitonin 3.776 (*)    MAGNESIUM - Abnormal    Magnesium 1.4 (*)    INFLUENZA A & B BY MOLECULAR - Normal    INFLUENZA A MOLECULAR Negative      INFLUENZA B MOLECULAR  Negative     URINALYSIS, REFLEX TO URINE CULTURE - Normal    Color, UA Yellow      Appearance, UA Clear      Spec Grav UA 1.015      pH, UA 7.0      Protein, UA Negative      Glucose, UA Negative       Ketones, UA Negative      Blood, UA Negative      Bilirubin, UA Negative      Urobilinogen, UA Negative      Nitrites, UA Negative      Leukocyte Esterase, UA Negative     LIPASE - Normal    Lipase Level 58     SARS-COV-2 RNA AMPLIFICATION, QUAL - Normal    SARS COV-2 Molecular Negative     CULTURE, BLOOD   CULTURE, BLOOD   CBC W/ AUTO DIFFERENTIAL    Narrative:     The following orders were created for panel order CBC auto differential.  Procedure                               Abnormality         Status                     ---------                               -----------         ------                     CBC with Differential[[id number]]       Abnormal            Final result                 Please view results for these tests on the individual orders.   EXTRA TUBES    Narrative:     The following orders were created for panel order EXTRA TUBES.  Procedure                               Abnormality         Status                     ---------                               -----------         ------                     Light Blue Top Hold[[id number]]                             In process                 Gold Top Hold[[id number]]                                   In process                   Please view results for these tests on the individual orders.   LIGHT BLUE TOP HOLD   GOLD TOP HOLD   ISTAT LACTATE    POC Lactate 2.03      Sample VENOUS     POCT LACTATE        ECG Results              EKG 12-lead (In process)        Collection Time Result Time QRS Duration OHS QTC Calculation    07/11/25 13:07:05 07/11/25 13:41:43 130 467                     In process by Interface, Lab In [facility] (07/11/25 13:41:49)                   Narrative:    Test Reason : Z13.6,    Vent. Rate :  95 BPM     Atrial Rate :  95 BPM     P-R Int : 174 ms          QRS Dur : 130 ms      QT Int : 372 ms       P-R-T Axes :  72  71  72 degrees    QTcB Int : 467 ms    Normal sinus rhythm with sinus arrhythmia  Right bundle branch  block  Abnormal ECG  No previous ECGs available    Referred By: AAAREFERRAL SELF           Confirmed By:                                   Imaging Results              CT Chest Abdomen Pelvis With IV Contrast (XPD) NO Oral Contrast (In process)  Result time 07/11/25 15:06:14   Procedure changed from CT Abdomen Pelvis With IV Contrast NO Oral Contrast                    CT Head Without Contrast (Final result)  Result time 07/11/25 15:03:25      Final result by Lizzy Scott MD (07/11/25 15:03:25)                   Impression:      No acute intracranial abnormalities. Chronic findings as discussed above.      Electronically signed by: Lizzy Scott  Date:    07/11/2025  Time:    15:03               Narrative:    EXAMINATION:  CT HEAD WITHOUT CONTRAST    CLINICAL HISTORY:  Mental status change, unknown cause;.    TECHNIQUE:  Noninfusion images were obtained from the skull base to the vertex.    All CT scans at this facility utilize dose modulation, iterative reconstruction, and/or weight based dosing when appropriate to reduce radiation dose to as low as reasonably achievable    CMS MANDATED QUALITY DATA - CT RADIATION - 436    COMPARISON:  None.    FINDINGS:  There is no intracranial mass, hemorrhage, or midline shift. Ventricles and sulci are mildly prominent. There are no pathologic extra-axial fluid collections.    There is no evidence of cortical ischemic change. Changes of mild chronic microvascular white matter disease are noted. Cerebellum and brainstem are normal. The calvarium is intact.                                       X-Ray Tibia Fibula 2 View Left (Final result)  Result time 07/11/25 14:45:18      Final result by Lizzy Scott MD (07/11/25 14:45:18)                   Impression:      No acute fracture or dislocation.      Electronically signed by: Lizzy Scott  Date:    07/11/2025  Time:    14:45               Narrative:    CLINICAL HISTORY:  (MRN [id number] y/o  (1955)  M    Pain in leg, unspecified    TECHNIQUE:  (A# [id number], Exam time 7/11/2025 14:41)    EOX722 XR TIBIA FIBULA 2 VIEW LEFT  view(s) obtained.    COMPARISON:  06/01/2025    FINDINGS:  No acute fracture or dislocation. The joints and interspaces appear to be maintained.  Soft tissues appear radiographically within normal limits and no radiopaque foreign body is seen.                                       X-Ray Chest AP Portable (Final result)  Result time 07/11/25 13:32:42   Procedure changed from X-Ray Chest 1 View     Final result by Lizzy Scott MD (07/11/25 13:32:42)                   Impression:      No acute cardiopulmonary abnormality.      Electronically signed by: Lizzy Scott  Date:    07/11/2025  Time:    13:32               Narrative:    EXAMINATION:  XR CHEST AP PORTABLE    CLINICAL HISTORY:  Sepsis;    FINDINGS:  Portable chest at 13:12 hours is compared to 06/01/2025 shows normal cardiomediastinal silhouette.    Lungs are clear. Pulmonary vasculature is normal. No acute osseous abnormality.                                       Medications   vancomycin - pharmacy to dose (has no administration in time range)   vancomycin 2,000 mg in 0.9% NaCl 500 mL IVPB (admixture device) (2,000 mg Intravenous New Bag 7/11/25 1510)   magnesium sulfate 2g in water 50mL IVPB (premix) (2 g Intravenous New Bag 7/11/25 1515)   lactated ringers bolus 2,721 mL (2,721 mLs Intravenous New Bag 7/11/25 1309)   piperacillin-tazobactam (ZOSYN) 4.5 g in D5W 100 mL IVPB (MB+) (0 g Intravenous Stopped 7/11/25 1356)   acetaminophen tablet 650 mg (650 mg Oral Given 7/11/25 1309)   iohexoL (OMNIPAQUE 350) injection 100 mL (100 mLs Intravenous Given 7/11/25 1501)     Medical Decision Making  Patient presenting with fever and change in mental status    Considerations include but are not limited to urinary tract infection, left lower extremity cellulitis, bacteremia, sepsis, abscess, pneumonia, acute kidney injury,  "dehydration    MDM    Patient presents for emergent evaluation of acute fever, change in mental status that poses a possible threat to life and/or bodily function.    In the ED patient found to have acute fever, change in mental status.     Amount and/or complexity of data reviewed  I ordered labs and personally reviewed them.  Labs significant for normal lactate, normal white blood cell count, normal urinalysis.    I ordered X-rays and personally reviewed them and reviewed the radiologist interpretation.  Xray significant for no acute cardiopulmonary process.    I ordered EKG and personally reviewed it.  EKG significant for regular rate and rhythm, no ST elevation, ST changes.    I ordered CT scan and personally reviewed it and reviewed the radiologist interpretation.  CT significant for there is a consolidation pneumonia to both lower lung fields, await official report.      I did review prior medical records.    Admission MDM and Risk  I discussed the patient presentation labs, ekg, X-rays, CT findings with the consultant(s) hospitalist   Patient was managed in the ED with IV normal saline, broad-spectrum antibiotics.    The response to treatment was improved.    Shared decision-making with the patient regarding diagnosis, treatment, and plan was well received.    Patient required emergent consultation to hospitalist for admission.    Stevie Cristina presents with sepsis with organ dysfunction elevated troponin secondary to Unknown.    Interventions include:    Antibiotics:    vancomycin - pharmacy to dose, pharmacy to manage frequency, Intravenous  vancomycin 2,000 mg in 0.9% NaCl 500 mL IVPB (admixture device), Once, Intravenous    Fluid Resuscitation:   Fluid Not Needed - Patient is not hypotensive and/or lactate is less than 4.0.     Labs and Imaging:   Lab             07/11/25                       1305          POCLAC       2.03          No results found for: "CULTBLD"   Additional cultures were " collected as indicated and imaging reviewed to identify infection source.    Hemodynamic Support and Monitoring:  Vasopressors were not needed     The patient was re-evaluated at Admission Time and Date: 7/11/2025 12:38 PM and patient and/or surrogate was updated on plan of care.    The following services were consulted:Hospital Medicine     I suspect patient has elevated troponin is secondary to end-organ damage from sepsis.  We will need to trend the troponin.  EKGs does have ST changes there is no ST-elevation    Attending Critical Care:   Critical Care Times:   Direct Patient Care (initial evaluation, reassessments, and time considering the case)................................................................10 minutes.   Additional History from reviewing old medical records or taking additional history from the family, EMS, PCP, etc.......................10 minutes.   Ordering, Reviewing, and Interpreting Diagnostic Studies...............................................................................................................10 minutes.   Documentation..................................................................................................................................................................................5 minutes.   ==============================================================  · Total Critical Care Time - exclusive of procedural time: 35 minutes.  ==============================================================  Critical care was necessary to treat or prevent imminent or life-threatening deterioration of the following conditions:  Sepsis.   Critical care was time spent personally by me on the following activities: obtaining history from patient or relative, examination of patient, review of x-rays / CT sent with the patient, ordering lab, x-rays, and/or EKG, development of treatment plan with patient or relative, ordering and performing treatments and interventions, interpretation  of cardiac measurements and re-evaluation of patient's condition.   Critical Care Condition: potentially life-threatening     Amount and/or Complexity of Data Reviewed  Labs: ordered. Decision-making details documented in ED Course.  Radiology: ordered.    Risk  OTC drugs.  Prescription drug management.  Decision regarding hospitalization.               ED Course as of 07/11/25 1512   Fri Jul 11, 2025   1321 POC Lactate: 2.03 [AP]   1332 POC Lactate: 2.03 [AP]   1332 WBC: 5.26 [AP]   1332 Hemoglobin(!): 8.5 [AP]   1332 Hematocrit(!): 28.3 [AP]   1332 Platelet Count: 178 [AP]   1339 Leukocyte Esterase, UA: Negative [AP]   1340 NITRITE UA: Negative [AP]   1340 Urobilinogen, UA: Negative [AP]   1340 Bilirubin, UA: Negative [AP]   1340 Blood, UA: Negative [AP]   1340 Ketones, UA: Negative [AP]   1340 Glucose, UA: Negative [AP]   1340 Color, UA: Yellow [AP]   1340 WBC: 5.26 [AP]   1340 Hemoglobin(!): 8.5 [AP]   1340 Hematocrit(!): 28.3 [AP]   1340 Platelet Count: 178 [AP]   1426 eGFR: >60 [AP]   1426 Anion Gap(!): 6 [AP]   1426 ALT: 25 [AP]   1426 AST(!): 69 [AP]   1426 ALP: 112 [AP]   1426 BILIRUBIN TOTAL: 1.0 [AP]   1426 Albumin(!): 3.2 [AP]   1426 PROTEIN TOTAL: 6.5 [AP]   1426 Calcium(!): 8.0 [AP]   1426 Creatinine: 0.7 [AP]   1426 BUN: 10 [AP]   1426 Glucose: 95 [AP]   1426 CO2: 27 [AP]   1426 Chloride: 104 [AP]   1426 Potassium: 3.8 [AP]   1426 Sodium: 137 [AP]   1436 Troponin I High Sensitivity(!!): 368.3 [AP]   1436 BNP(!): 136 [AP]   1436 Magnesium (!): 1.4 [AP]   1436 Phosphorus Level(!): 1.6 [AP]   1448 Troponin I High Sensitivity(!!): 368.3 [AP]   1448 Procalcitonin(!): 3.776 [AP]   1448 BNP(!): 136 [AP]   1448 Magnesium (!): 1.4 [AP]      ED Course User Index  [AP] Homero Gerber MD               Medical Decision Making:   Clinical Tests:   Sepsis Perfusion Assessment: ED Sepsis Reassessment Timestamp             Clinical Impression:  Final diagnoses:  [Z13.6] Screening for cardiovascular  condition  [M79.606] Leg pain  [R41.82] Altered mental status, unspecified altered mental status type (Primary)  [L03.116] Left leg cellulitis  [R50.9] Fever, unspecified fever cause  [J18.9] Pneumonia due to infectious organism, unspecified laterality, unspecified part of lung          ED Disposition Condition    Admit                       [1]   Social History  Tobacco Use    Smoking status: Never    Smokeless tobacco: Never   Substance Use Topics    Alcohol use: Never    Drug use: Never        Homero Gerber MD  07/11/25 1512

## 2025-07-11 NOTE — ASSESSMENT & PLAN NOTE
Mostly type 2 MI from sepsis  Trend troponin's  EKG reviewed  ECHO ordered  If trops keep going up need to be started on heparin drip and cardiology consult

## 2025-07-12 PROBLEM — I21.4 NSTEMI (NON-ST ELEVATED MYOCARDIAL INFARCTION): Status: RESOLVED | Noted: 2025-07-11 | Resolved: 2025-07-12

## 2025-07-12 PROBLEM — R65.20 SEVERE SEPSIS: Status: RESOLVED | Noted: 2025-07-11 | Resolved: 2025-07-12

## 2025-07-12 PROBLEM — A41.9 SEVERE SEPSIS: Status: RESOLVED | Noted: 2025-07-11 | Resolved: 2025-07-12

## 2025-07-12 LAB
ABO + RH BLD: NORMAL
ABORH RETYPE: NORMAL
ABSOLUTE EOSINOPHIL (SMH): 0.02 K/UL
ABSOLUTE EOSINOPHIL (SMH): 0.02 K/UL
ABSOLUTE MONOCYTE (SMH): 0.17 K/UL (ref 0.3–1)
ABSOLUTE MONOCYTE (SMH): 0.17 K/UL (ref 0.3–1)
ABSOLUTE NEUTROPHIL COUNT (SMH): 1.9 K/UL (ref 1.8–7.7)
ABSOLUTE NEUTROPHIL COUNT (SMH): 1.9 K/UL (ref 1.8–7.7)
ALBUMIN SERPL-MCNC: 2.6 G/DL (ref 3.5–5.2)
ALP SERPL-CCNC: 68 UNIT/L (ref 55–135)
ALT SERPL-CCNC: 19 UNIT/L (ref 10–44)
ANION GAP (SMH): 4 MMOL/L (ref 8–16)
ANION GAP (SMH): 4 MMOL/L (ref 8–16)
AST SERPL-CCNC: 37 UNIT/L (ref 10–40)
BASOPHILS # BLD AUTO: 0 K/UL
BASOPHILS # BLD AUTO: 0.01 K/UL
BASOPHILS NFR BLD AUTO: 0 %
BASOPHILS NFR BLD AUTO: 0.4 %
BILIRUB SERPL-MCNC: 1.4 MG/DL (ref 0.1–1)
BLD PROD TYP BPU: NORMAL
BLOOD UNIT EXPIRATION DATE: NORMAL
BLOOD UNIT TYPE CODE: 5100
BUN SERPL-MCNC: 10 MG/DL (ref 8–23)
BUN SERPL-MCNC: 10 MG/DL (ref 8–23)
CALCIUM SERPL-MCNC: 7.3 MG/DL (ref 8.7–10.5)
CALCIUM SERPL-MCNC: 7.4 MG/DL (ref 8.7–10.5)
CHLORIDE SERPL-SCNC: 104 MMOL/L (ref 95–110)
CHLORIDE SERPL-SCNC: 104 MMOL/L (ref 95–110)
CO2 SERPL-SCNC: 27 MMOL/L (ref 23–29)
CO2 SERPL-SCNC: 27 MMOL/L (ref 23–29)
CREAT SERPL-MCNC: 0.7 MG/DL (ref 0.5–1.4)
CREAT SERPL-MCNC: 0.7 MG/DL (ref 0.5–1.4)
CROSSMATCH INTERPRETATION: NORMAL
DISPENSE STATUS: NORMAL
ERYTHROCYTE [DISTWIDTH] IN BLOOD BY AUTOMATED COUNT: 17.5 % (ref 11.5–14.5)
ERYTHROCYTE [DISTWIDTH] IN BLOOD BY AUTOMATED COUNT: 17.6 % (ref 11.5–14.5)
GFR SERPLBLD CREATININE-BSD FMLA CKD-EPI: >60 ML/MIN/1.73/M2
GFR SERPLBLD CREATININE-BSD FMLA CKD-EPI: >60 ML/MIN/1.73/M2
GLUCOSE SERPL-MCNC: 83 MG/DL (ref 70–110)
GLUCOSE SERPL-MCNC: 86 MG/DL (ref 70–110)
HCT VFR BLD AUTO: 21.7 % (ref 40–54)
HCT VFR BLD AUTO: 22.2 % (ref 40–54)
HGB BLD-MCNC: 6.6 GM/DL (ref 14–18)
HGB BLD-MCNC: 7 GM/DL (ref 14–18)
IMM GRANULOCYTES # BLD AUTO: 0.01 K/UL (ref 0–0.04)
IMM GRANULOCYTES # BLD AUTO: 0.01 K/UL (ref 0–0.04)
IMM GRANULOCYTES NFR BLD AUTO: 0.4 % (ref 0–0.5)
IMM GRANULOCYTES NFR BLD AUTO: 0.4 % (ref 0–0.5)
INDIRECT COOMBS: NORMAL
LACTATE SERPL-SCNC: 1.5 MMOL/L (ref 0.5–1.9)
LYMPHOCYTES # BLD AUTO: 0.29 K/UL (ref 1–4.8)
LYMPHOCYTES # BLD AUTO: 0.3 K/UL (ref 1–4.8)
MAGNESIUM SERPL-MCNC: 1.3 MG/DL (ref 1.6–2.6)
MCH RBC QN AUTO: 23.1 PG (ref 27–31)
MCH RBC QN AUTO: 23.5 PG (ref 27–31)
MCHC RBC AUTO-ENTMCNC: 29.7 G/DL (ref 32–36)
MCHC RBC AUTO-ENTMCNC: 30.4 G/DL (ref 32–36)
MCV RBC AUTO: 77 FL (ref 82–98)
MCV RBC AUTO: 78 FL (ref 82–98)
NUCLEATED RBC (/100WBC) (SMH): 0 /100 WBC
NUCLEATED RBC (/100WBC) (SMH): 0 /100 WBC
PLATELET # BLD AUTO: 71 K/UL (ref 150–450)
PLATELET # BLD AUTO: 76 K/UL (ref 150–450)
PLATELET BLD QL SMEAR: ABNORMAL
PMV BLD AUTO: ABNORMAL FL
PMV BLD AUTO: ABNORMAL FL
POTASSIUM SERPL-SCNC: 3.7 MMOL/L (ref 3.5–5.1)
POTASSIUM SERPL-SCNC: 3.8 MMOL/L (ref 3.5–5.1)
PROT SERPL-MCNC: 5.2 GM/DL (ref 6–8.4)
RBC # BLD AUTO: 2.81 M/UL (ref 4.6–6.2)
RBC # BLD AUTO: 2.86 M/UL (ref 4.6–6.2)
RELATIVE EOSINOPHIL (SMH): 0.8 % (ref 0–8)
RELATIVE EOSINOPHIL (SMH): 0.8 % (ref 0–8)
RELATIVE LYMPHOCYTE (SMH): 12.1 % (ref 18–48)
RELATIVE LYMPHOCYTE (SMH): 12.6 % (ref 18–48)
RELATIVE MONOCYTE (SMH): 7.1 % (ref 4–15)
RELATIVE MONOCYTE (SMH): 7.1 % (ref 4–15)
RELATIVE NEUTROPHIL (SMH): 79.1 % (ref 38–73)
RELATIVE NEUTROPHIL (SMH): 79.2 % (ref 38–73)
RH BLD: NORMAL
SODIUM SERPL-SCNC: 135 MMOL/L (ref 136–145)
SODIUM SERPL-SCNC: 135 MMOL/L (ref 136–145)
SPECIMEN OUTDATE: NORMAL
TROPONIN HIGH SENSITIVE (SMH): 235.2 PG/ML
UNIT NUMBER: NORMAL
WBC # BLD AUTO: 2.38 K/UL (ref 3.9–12.7)
WBC # BLD AUTO: 2.4 K/UL (ref 3.9–12.7)

## 2025-07-12 PROCEDURE — 99900035 HC TECH TIME PER 15 MIN (STAT)

## 2025-07-12 PROCEDURE — 63600175 PHARM REV CODE 636 W HCPCS

## 2025-07-12 PROCEDURE — 85025 COMPLETE CBC W/AUTO DIFF WBC: CPT | Mod: 91 | Performed by: INTERNAL MEDICINE

## 2025-07-12 PROCEDURE — 36430 TRANSFUSION BLD/BLD COMPNT: CPT

## 2025-07-12 PROCEDURE — 36415 COLL VENOUS BLD VENIPUNCTURE: CPT | Performed by: INTERNAL MEDICINE

## 2025-07-12 PROCEDURE — 25000003 PHARM REV CODE 250

## 2025-07-12 PROCEDURE — 84484 ASSAY OF TROPONIN QUANT: CPT | Performed by: INTERNAL MEDICINE

## 2025-07-12 PROCEDURE — 86920 COMPATIBILITY TEST SPIN: CPT

## 2025-07-12 PROCEDURE — 94761 N-INVAS EAR/PLS OXIMETRY MLT: CPT

## 2025-07-12 PROCEDURE — 36415 COLL VENOUS BLD VENIPUNCTURE: CPT

## 2025-07-12 PROCEDURE — 85025 COMPLETE CBC W/AUTO DIFF WBC: CPT | Performed by: INTERNAL MEDICINE

## 2025-07-12 PROCEDURE — 99900031 HC PATIENT EDUCATION (STAT)

## 2025-07-12 PROCEDURE — P9016 RBC LEUKOCYTES REDUCED: HCPCS

## 2025-07-12 PROCEDURE — 25000003 PHARM REV CODE 250: Performed by: INTERNAL MEDICINE

## 2025-07-12 PROCEDURE — 11000001 HC ACUTE MED/SURG PRIVATE ROOM

## 2025-07-12 PROCEDURE — 82310 ASSAY OF CALCIUM: CPT | Performed by: INTERNAL MEDICINE

## 2025-07-12 PROCEDURE — 83735 ASSAY OF MAGNESIUM: CPT | Performed by: INTERNAL MEDICINE

## 2025-07-12 PROCEDURE — 30233N1 TRANSFUSION OF NONAUTOLOGOUS RED BLOOD CELLS INTO PERIPHERAL VEIN, PERCUTANEOUS APPROACH: ICD-10-PCS

## 2025-07-12 PROCEDURE — 63600175 PHARM REV CODE 636 W HCPCS: Performed by: INTERNAL MEDICINE

## 2025-07-12 PROCEDURE — 80053 COMPREHEN METABOLIC PANEL: CPT | Performed by: INTERNAL MEDICINE

## 2025-07-12 PROCEDURE — 86850 RBC ANTIBODY SCREEN: CPT

## 2025-07-12 PROCEDURE — 83605 ASSAY OF LACTIC ACID: CPT | Performed by: INTERNAL MEDICINE

## 2025-07-12 PROCEDURE — 94799 UNLISTED PULMONARY SVC/PX: CPT

## 2025-07-12 RX ORDER — HYDROCODONE BITARTRATE AND ACETAMINOPHEN 500; 5 MG/1; MG/1
TABLET ORAL
Status: DISCONTINUED | OUTPATIENT
Start: 2025-07-12 | End: 2025-07-19 | Stop reason: HOSPADM

## 2025-07-12 RX ORDER — METHADONE HYDROCHLORIDE 10 MG/1
110 TABLET ORAL DAILY
Refills: 0 | Status: DISCONTINUED | OUTPATIENT
Start: 2025-07-12 | End: 2025-07-19 | Stop reason: HOSPADM

## 2025-07-12 RX ORDER — CEFEPIME HYDROCHLORIDE 2 G/1
2 INJECTION, POWDER, FOR SOLUTION INTRAVENOUS
Status: DISCONTINUED | OUTPATIENT
Start: 2025-07-12 | End: 2025-07-13

## 2025-07-12 RX ADMIN — VANCOMYCIN HYDROCHLORIDE 1750 MG: 500 INJECTION, POWDER, LYOPHILIZED, FOR SOLUTION INTRAVENOUS at 02:07

## 2025-07-12 RX ADMIN — CEFEPIME 2 G: 2 INJECTION, POWDER, FOR SOLUTION INTRAVENOUS at 01:07

## 2025-07-12 RX ADMIN — CEFEPIME 2 G: 2 INJECTION, POWDER, FOR SOLUTION INTRAVENOUS at 08:07

## 2025-07-12 RX ADMIN — PIPERACILLIN SODIUM AND TAZOBACTAM SODIUM 4.5 G: 4; .5 INJECTION, POWDER, LYOPHILIZED, FOR SOLUTION INTRAVENOUS at 05:07

## 2025-07-12 RX ADMIN — ENOXAPARIN SODIUM 40 MG: 40 INJECTION SUBCUTANEOUS at 05:07

## 2025-07-12 RX ADMIN — METHADONE HYDROCHLORIDE 110 MG: 10 TABLET ORAL at 01:07

## 2025-07-12 RX ADMIN — PANTOPRAZOLE SODIUM 40 MG: 40 TABLET, DELAYED RELEASE ORAL at 08:07

## 2025-07-12 RX ADMIN — SODIUM CHLORIDE: 9 INJECTION, SOLUTION INTRAVENOUS at 07:07

## 2025-07-12 NOTE — NURSING
Patient CBC and BMP labs resulted MD notified. STAT redraw placed. Will pass on to on coming nursing.

## 2025-07-12 NOTE — PROGRESS NOTES
"[facility]  Adult Nutrition   Progress Note (Initial Assessment)     Admit Date: 7/11/2025   Total duration of encounter: 1 day     SUMMARY   Recommendations  1. Continue Regular diet as tolerated. 2. Recommend Darron BID to assist with healing. 3. MST 2, patient reports he was trying to loose weight. 13%29 lbs in 6 months.    Nutrition Goals:   Goal #1: PO intake will meet greater than or equal to 50% of estimated needs by RD follow up   Nutrition Goal Status #1: new  Goal #2: Oral supplement consumption of greater than or equal to 50% of estimated needs by RD follow up  Nutrition Goal Status #2: new    Nutrition Interventions: General healthful diet, Medical food supplement therapy      Nutrition Diagnosis   Increased nutrient needs related to Wound healing as evidence by  (chronic LE wound)  Status: New    Dietitian Rounds Brief  RD screen for MST 2. Patient unsure of weight loss with no poor intake / appetite prior to admit. Patient reported he was trying to loose weight. Intake of lunch today was 100%. Pt agreed to try Darron 2 x/day. RD to follow for intake and status PRN.      Malnutrition Assessment     Skin (Micronutrient): wounds unhealed       Weight Loss (Malnutrition): greater than 10% in 6 months Planned       No evidence of malnutrition at this time.                 Diet order:   Diet Adult Regular  Dietary nutrition supplements By Mouth; BID; Darron - Any flavor   Oral Nutrition Supplement: Darron BID             Evaluation of Received Nutrient/Fluid Intake  Energy Calories Required: meeting needs  Protein Required: meeting needs  Fluid Required: meeting needs  Tolerance: tolerating     % Intake of Estimated Energy Needs: 75 - 100 %  % Meal Intake: 75 - 100 %      Intake/Output Summary (Last 24 hours) at 7/12/2025 1510  Last data filed at 7/12/2025 1331  Gross per 24 hour   Intake 1230 ml   Output 1450 ml   Net -220 ml        Anthropometrics  Height: 6' 1" (185.4 cm)  Height (inches): 73 " in  Height Method: Stated  Weight: 93.8 kg (206 lb 12.7 oz)  Weight (lb): 206.79 lb  Weight Method: Bed Scale  Ideal Body Weight (IBW), Male: 184 lb  % Ideal Body Weight, Male (lb): 112.39 %  BMI (Calculated): 27.3  BMI Grade: 25 - 29.9 - overweight       Estimated/Assessed Needs  Weight Used For Calorie Calculations: 93.8 kg (206 lb 12.7 oz)  Energy Calorie Requirements (kcal): 2177 kcal/day (23 kcal/kg)  Energy Need Method: [place]-St Jeor  Protein Requirements: 75-94 gm/day (0.8-1.0 gm/kg).  Weight Used For Protein Calculations: 93.8 kg (206 lb 12.7 oz)     Estimated Fluid Requirement Method: RDA Method  RDA Method (mL): 2177  CHO Requirement: n/a    Reason for Assessment  Reason For Assessment: identified at risk by screening criteria (MST 2)  Diagnosis: infection/sepsis  General Information Comments: Patient with history of Crohn's disease admitted for sepsis.    Final diagnoses:  [Z13.6] Screening for cardiovascular condition  [M79.606] Leg pain  [R41.82] Altered mental status, unspecified altered mental status type (Primary)  [L03.116] Left leg cellulitis  [R50.9] Fever, unspecified fever cause  [J18.9] Pneumonia due to infectious organism, unspecified laterality, unspecified part of lung  [A41.9, R65.20] Severe sepsis  [J18.9] Pneumonia     Past Medical History:   Diagnosis Date    Colon polyp     Crohn disease     Hard of hearing     Hepatitis C     Hepatitis C     Hypertension     TB lung, latent         Nutrition/Diet History  Nutrition Intake History: No reported poor intake or appeite prior to admit. Patient reports unplanned weight loss. Unsure how much.  Food Preferences:  obtained.  Spiritual, Cultural Beliefs, Sabianist Practices, Values that Affect Care: no  Food Allergies: NKFA  Factors Affecting Nutritional Intake: altered gastrointestinal function (nausea with diarrhea prior to admit.)  Nutrition-related SDOH: None Identified    Nutrition Risk Screen          Wound 07/11/25 1830 Other  (comment) Left anterior Leg-Wound Image: Images linked  MST Score: 2  Have you recently lost weight without trying?: Unsure  Weight loss score: 2  Have you been eating poorly because of a decreased appetite?: No  Appetite score: 0       Weight History:  Wt Readings from Last 10 Encounters:   07/11/25 93.8 kg (206 lb 12.7 oz)   06/01/25 90.7 kg (200 lb)   01/17/25 106.6 kg (235 lb 0.2 oz)   02/13/21 106.6 kg (235 lb)   11/19/19 108.9 kg (240 lb)   11/19/19 108.9 kg (240 lb)        Lab/Procedures/Meds:   Pertinent Labs Reviewed: reviewed  Pertinent Medications Reviewed: reviewed  Medications:Pertinent Medications Reviewed  Scheduled Meds:   ceFEPime IV (PEDS and ADULTS)  2 g Intravenous Q8H    enoxparin  40 mg Subcutaneous Daily    methadone  110 mg Oral Daily    pantoprazole  40 mg Oral QAM     Continuous Infusions:  PRN Meds:.  Current Facility-Administered Medications:     0.9%  NaCl infusion (for blood administration), , Intravenous, Q24H PRN    acetaminophen, 650 mg, Oral, Q8H PRN    acetaminophen, 650 mg, Oral, Q4H PRN    aluminum-magnesium hydroxide-simethicone, 30 mL, Oral, QID PRN    HYDROmorphone, 0.5 mg, Intravenous, Q6H PRN    magnesium oxide, 800 mg, Oral, PRN    magnesium oxide, 800 mg, Oral, PRN    melatonin, 6 mg, Oral, Nightly PRN    naloxone, 0.02 mg, Intravenous, PRN    ondansetron, 4 mg, Intravenous, Q6H PRN    potassium bicarbonate, 35 mEq, Oral, PRN    potassium bicarbonate, 50 mEq, Oral, PRN    potassium bicarbonate, 60 mEq, Oral, PRN    potassium, sodium phosphates, 2 packet, Oral, PRN    potassium, sodium phosphates, 2 packet, Oral, PRN    potassium, sodium phosphates, 2 packet, Oral, PRN    Labs: Pertinent Labs Reviewed  Clinical Chemistry:  Recent Labs   Lab 07/11/25  1256 07/11/25  1300 07/12/25  0430 07/12/25  0659     --  135* 135*   K 3.8  --  3.8 3.7     --  104 104   CO2 27  --  27 27   GLU 95  --  86 83   BUN 10  --  10 10   CREATININE 0.7  --  0.7 0.7   CALCIUM 8.0*  --   "7.4* 7.3*   PROT 6.5  --  5.2*  --    ALBUMIN 3.2*  --  2.6*  --    BILITOT 1.0  --  1.4*  --    ALKPHOS 112  --  68  --    AST 69*  --  37  --    ALT 25  --  19  --    ANIONGAP 6*  --  4* 4*   MG 1.4* 1.4* 1.3*  --    PHOS 1.6*  --   --   --    LIPASE  --  58  --   --      CBC:   Recent Labs   Lab 07/12/25  0659   WBC 2.38*   RBC 2.81*   HGB 6.6*   HCT 21.7*   PLT 71*   MCV 77*   MCH 23.5*   MCHC 30.4*     Lipid Panel:  No results for input(s): "CHOL", "HDL", "LDLCALC", "TRIG", "CHOLHDL" in the last 168 hours.  Cardiac Profile:  Recent Labs   Lab 07/11/25  1300   *     Inflammatory Labs:  No results for input(s): "CRP" in the last 168 hours.  Diabetes:  No results for input(s): "HGBA1C", "POCTGLUCOSE" in the last 168 hours.  Thyroid & Parathyroid:  No results for input(s): "TSH", "FREET4", "J0FIHVH", "N7LIYBL", "THYROIDAB" in the last 168 hours.    Monitor and Evaluation  Monitor and Evaluation: Food and beverage intake, Diet order     Discharge Planning  Nutrition Discharge Planning: General healthy diet, Oral supplement regimen (comments)    Nutrition Risk  Level of Risk/Frequency of Follow-up:  (1 x / week)     Nutrition Follow-Up  RD Follow-up?: Yes            "

## 2025-07-12 NOTE — HOSPITAL COURSE
70-year-old gentleman presents to the emergency department with complaints of fever, chills and dyspnea.  Imaging consistent with pneumonia patient is started on broad-spectrum antibiotics.  Patient noted to be in severe sepsis on presentation which quickly resolved after 1 day of antibiotics.  Patient quickly defervesced.Found to have drop in H/H with history of crohn's disease. He received 1 unti of PRBCs. No evidence of active bleeding. ID consulted.  Infectious Disease recommended completing antibiotics for total of 10 days.  Patient's symptoms improve, discharge with instructions follow up outpatient with PCP, Hematology, Infectious Disease.    Physical Exam.   Cardiovascular:      Rate and Rhythm: Normal rate and regular rhythm.      Heart sounds: Normal heart sounds.   Pulmonary:      Effort: Pulmonary effort is normal.      Breath sounds: Normal breath sounds.

## 2025-07-12 NOTE — PROGRESS NOTES
Therapy with Vancomycin order discontinued by Dr. Salmon on 07/12/2025 @ 12:00.     Pharmacy will sign off, please re-consult as needed.  Thank you for allowing us to participate in this patient's care.  Miroslava Mary 7/12/2025 12:05 PM  Dept of Pharmacy  Ext [phone]

## 2025-07-12 NOTE — PLAN OF CARE
07/12/25 1618   BILLS Message   Medicare Outpatient and Observation Notification regarding financial responsibility Given to patient/caregiver;Explained to patient/caregiver;Signed/date by patient/caregiver   Date BILLS was signed 07/12/25   Time BILLS was signed [phone]

## 2025-07-12 NOTE — PROGRESS NOTES
[facility] Medicine  Progress Note    Patient Name: Stevie Cristina  MRN: [id number]  Patient Class: OP- Observation   Admission Date: 7/11/2025  Length of Stay: 0 days  Attending Physician: Shereen Salmon MD  Primary Care Provider: Temitope, Primary Doctor        Subjective     Principal Problem:Severe sepsis        HPI:  70 year old pt getting admitted with severe sepsis/pneumonia  Pt overall is a poor historian and was not confused when saw in ER  He said he started having Nausea/Vomiting/diarrhea to ER physician but denied this c/o to me  He said he only had fever/chills/cough  Imaging showed evidence of pneumonia   His troponin levels were elevated but denied chest pain  Pt will be admitted under observation status as per  instruction     Overview/Hospital Course:  No notes on file    Interval History:  I have seen and examined the patient.  He is resting comfortably in no acute distress.  Patient is saturating well on room air.  He is hemodynamically stable.  He has defervesced.  We will deescalate to cefepime.  He states he feels excellent compared to yesterday.  Has no acute complaints at this time.    Review of Systems   All other systems reviewed and are negative.    Objective:     Vital Signs (Most Recent):  Temp: 98.2 °F (36.8 °C) (07/12/25 0703)  Pulse: 72 (07/12/25 0800)  Resp: 16 (07/12/25 0800)  BP: (!) 111/53 (07/12/25 0800)  SpO2: 98 % (07/12/25 0800) Vital Signs (24h Range):  Temp:  [98.2 °F (36.8 °C)-103 °F (39.4 °C)] 98.2 °F (36.8 °C)  Pulse:  [] 72  Resp:  [13-24] 16  SpO2:  [94 %-99 %] 98 %  BP: (111-155)/(53-65) 111/53     Weight: 93.8 kg (206 lb 12.7 oz)  Body mass index is 27.28 kg/m².    Intake/Output Summary (Last 24 hours) at 7/12/2025 1201  Last data filed at 7/12/2025 0914  Gross per 24 hour   Intake 3811 ml   Output 1050 ml   Net 2761 ml         Physical Exam  Vitals and nursing note reviewed.   Constitutional:       Appearance: He is  well-developed.   HENT:      Head: Atraumatic.      Right Ear: External ear normal.      Left Ear: External ear normal.      Nose: Nose normal.   Eyes:      Extraocular Movements: Extraocular movements intact.   Cardiovascular:      Rate and Rhythm: Normal rate and regular rhythm.   Pulmonary:      Effort: Pulmonary effort is normal.   Abdominal:      Palpations: Abdomen is soft.   Musculoskeletal:         General: Normal range of motion.      Cervical back: Full passive range of motion without pain and normal range of motion.   Skin:     General: Skin is warm.      Comments: LLE wound   Neurological:      Mental Status: He is alert and oriented to person, place, and time.   Psychiatric:         Behavior: Behavior normal.               Significant Labs: All pertinent labs within the past 24 hours have been reviewed.  BMP:   Recent Labs   Lab 07/12/25  0430 07/12/25  0659   GLU 86 83   * 135*   K 3.8 3.7    104   CO2 27 27   BUN 10 10   CREATININE 0.7 0.7   CALCIUM 7.4* 7.3*   MG 1.3*  --      CBC:   Recent Labs   Lab 07/11/25  1256 07/12/25  0430 07/12/25  0659   WBC 5.26 2.40* 2.38*   HGB 8.5* 7.0* 6.6*   HCT 28.3* 22.2* 21.7*    76* 71*       Significant Imaging: I have reviewed all pertinent imaging results/findings within the past 24 hours.      Assessment & Plan  Severe sepsis (Resolved: 7/12/2025)  Sepsis has resolved, deescalate antibiotics    Current Antibiotics    ceFEPIme injection 2 g, Every 8 hours (non-standard times), Intravenous    Lactate  Recent Labs   Lab 07/11/25  1305 07/12/25  0430   LACTATE  --  1.5   POCLAC 2.03  --      Acute pneumonia      Antibiotics (From admission, onward)      Start     Stop Route Frequency Ordered    07/12/25 1315  ceFEPIme injection 2 g         -- IV Every 8 hours (non-standard times) 07/12/25 1201            Microbiology Results (last 7 days)       Procedure Component Value Units Date/Time    MRSA Screen by PCR [[id number]]     Order Status:  Canceled Specimen: Nasal Swab     Blood culture x two cultures. Draw prior to antibiotics [[id number]]  (Normal) Collected: 07/11/25 1254    Order Status: Completed Specimen: Blood from Peripheral, Forearm, Left Updated: 07/11/25 2002     CULTURE, BLOOD ([facility]) No Growth After 6 Hours    Blood culture x two cultures. Draw prior to antibiotics [[id number]]  (Normal) Collected: 07/11/25 1259    Order Status: Completed Specimen: Blood from Peripheral, Hand, Left Updated: 07/11/25 2002     CULTURE, BLOOD ([facility]) No Growth After 6 Hours    Influenza A & B by Molecular [[id number]]  (Normal) Collected: 07/11/25 1409    Order Status: Completed Specimen: Nasopharyngeal Swab Updated: 07/11/25 1435     INFLUENZA A MOLECULAR Negative     INFLUENZA B MOLECULAR  Negative    Blood culture x two cultures. Draw prior to antibiotics. [[id number]]     Order Status: Canceled Specimen: Blood     Blood culture x two cultures. Draw prior to antibiotics. [[id number]]     Order Status: Canceled Specimen: Blood           Deescalate as above  Crohn's disease  Aware     Open wound of left lower leg  Chronic issue for 2 years     Methadone dependence  Aware     VTE Risk Mitigation (From admission, onward)           Ordered     enoxaparin injection 40 mg  Daily         07/11/25 2045     IP VTE HIGH RISK PATIENT  Once         07/11/25 1704     Place sequential compression device  Until discontinued         07/11/25 1704                    Discharge Planning   JUNE: 7/13/2025     Code Status: Full Code   Medical Readiness for Discharge Date:                            Shereen Salmon MD  Department of Hospital Medicine   [facility]

## 2025-07-12 NOTE — SUBJECTIVE & OBJECTIVE
Interval History:  I have seen and examined the patient.  He is resting comfortably in no acute distress.  Patient is saturating well on room air.  He is hemodynamically stable.  He has defervesced.  We will deescalate to cefepime.  He states he feels excellent compared to yesterday.  Has no acute complaints at this time.    Review of Systems   All other systems reviewed and are negative.    Objective:     Vital Signs (Most Recent):  Temp: 98.2 °F (36.8 °C) (07/12/25 0703)  Pulse: 72 (07/12/25 0800)  Resp: 16 (07/12/25 0800)  BP: (!) 111/53 (07/12/25 0800)  SpO2: 98 % (07/12/25 0800) Vital Signs (24h Range):  Temp:  [98.2 °F (36.8 °C)-103 °F (39.4 °C)] 98.2 °F (36.8 °C)  Pulse:  [] 72  Resp:  [13-24] 16  SpO2:  [94 %-99 %] 98 %  BP: (111-155)/(53-65) 111/53     Weight: 93.8 kg (206 lb 12.7 oz)  Body mass index is 27.28 kg/m².    Intake/Output Summary (Last 24 hours) at 7/12/2025 1201  Last data filed at 7/12/2025 0914  Gross per 24 hour   Intake 3811 ml   Output 1050 ml   Net 2761 ml         Physical Exam  Vitals and nursing note reviewed.   Constitutional:       Appearance: He is well-developed.   HENT:      Head: Atraumatic.      Right Ear: External ear normal.      Left Ear: External ear normal.      Nose: Nose normal.   Eyes:      Extraocular Movements: Extraocular movements intact.   Cardiovascular:      Rate and Rhythm: Normal rate and regular rhythm.   Pulmonary:      Effort: Pulmonary effort is normal.   Abdominal:      Palpations: Abdomen is soft.   Musculoskeletal:         General: Normal range of motion.      Cervical back: Full passive range of motion without pain and normal range of motion.   Skin:     General: Skin is warm.      Comments: LLE wound   Neurological:      Mental Status: He is alert and oriented to person, place, and time.   Psychiatric:         Behavior: Behavior normal.               Significant Labs: All pertinent labs within the past 24 hours have been reviewed.  BMP:   Recent  Labs   Lab 07/12/25  0430 07/12/25  0659   GLU 86 83   * 135*   K 3.8 3.7    104   CO2 27 27   BUN 10 10   CREATININE 0.7 0.7   CALCIUM 7.4* 7.3*   MG 1.3*  --      CBC:   Recent Labs   Lab 07/11/25  1256 07/12/25  0430 07/12/25  0659   WBC 5.26 2.40* 2.38*   HGB 8.5* 7.0* 6.6*   HCT 28.3* 22.2* 21.7*    76* 71*       Significant Imaging: I have reviewed all pertinent imaging results/findings within the past 24 hours.

## 2025-07-12 NOTE — CARE UPDATE
07/11/25 2100   Patient Assessment/Suction   Level of Consciousness (AVPU) alert   Respiratory Effort Normal   Expansion/Accessory Muscles/Retractions no use of accessory muscles   PRE-TX-O2   Device (Oxygen Therapy) room air   Pulse Oximetry Type Continuous   $ Pulse Oximetry - Multiple Charge Pulse Oximetry - Multiple   Positioning   Head of Bed (HOB) Positioning HOB at 20-30 degrees   Positioning/Transfer Devices pillows;in use   Education   $ Education Other (see comment);15 min   Respiratory Evaluation   $ Care Plan Tech Time 15 min

## 2025-07-12 NOTE — PLAN OF CARE
Problem: Wound  Goal: Improved Oral Intake  Intervention: Promote and Optimize Oral Intake  Flowsheets (Taken 7/12/2025 1511)  Nutrition Interventions:   supplemental drinks provided   food preferences provided   diet liberalized

## 2025-07-12 NOTE — PROGRESS NOTES
"Pharmacokinetic Initial Assessment: IV Vancomycin    Assessment/Plan:    Initiate intravenous vancomycin with loading dose of 2000 mg once followed by a maintenance dose of vancomycin 1750mg IV every 12 hours  Desired empiric serum trough concentration is 15 to 20 mcg/mL  Draw vancomycin trough level 60 min prior to third dose on 07/12/2025 at approximately 14:00.  Pharmacy will continue to follow and monitor vancomycin.      Please contact pharmacy at extension [phone] with any questions regarding this assessment.     Thank you for the consult,   Miroslava Mary       Patient brief summary:  Stevie Cristina is a 70 y.o. male initiated on antimicrobial therapy with IV Vancomycin for treatment of suspected bacteremia    Drug Allergies:   Review of patient's allergies indicates:  No Known Allergies    Actual Body Weight:   93.8 kg    Renal Function:   Estimated Creatinine Clearance: 107.8 mL/min (based on SCr of 0.7 mg/dL).,     Dialysis Method (if applicable):  N/A    CBC (last 72 hours):  Recent Labs   Lab Result Units 07/11/25  1256   WBC K/uL 5.26   Hgb gm/dL 8.5*   Hct % 28.3*   Platelet Count K/uL 178   Mono % % 4.9   Eos % % 0.4   Basophil % % 0.2       Metabolic Panel (last 72 hours):  Recent Labs   Lab Result Units 07/11/25  1256 07/11/25  1300   Sodium mmol/L 137  --    Potassium mmol/L 3.8  --    Chloride mmol/L 104  --    CO2 mmol/L 27  --    Glucose mg/dL 95  --    Glucose, UA  Negative  --    BUN mg/dL 10  --    Creatinine mg/dL 0.7  --    Albumin g/dL 3.2*  --    Bilirubin Total mg/dL 1.0  --    ALP unit/L 112  --    AST unit/L 69*  --    ALT unit/L 25  --    Magnesium mg/dL 1.4* 1.4*   Phosphorus Level mg/dL 1.6*  --        Drug levels (last 3 results):  No results for input(s): "VANCOMYCINRA", "VANCORANDOM", "VANCOMYCINPE", "VANCOPEAK", "VANCOMYCINTR", "VANCOTROUGH" in the last 72 hours.    Microbiologic Results:  Microbiology Results (last 7 days)       Procedure Component Value Units Date/Time    " Influenza A & B by Molecular [[id number]]  (Normal) Collected: 07/11/25 1409    Order Status: Completed Specimen: Nasopharyngeal Swab Updated: 07/11/25 1435     INFLUENZA A MOLECULAR Negative     INFLUENZA B MOLECULAR  Negative    Blood culture x two cultures. Draw prior to antibiotics [[id number]] Collected: 07/11/25 1254    Order Status: Sent Specimen: Blood from Peripheral, Forearm, Left Updated: 07/11/25 1309    Blood culture x two cultures. Draw prior to antibiotics [[id number]] Collected: 07/11/25 1259    Order Status: Sent Specimen: Blood from Peripheral, Hand, Left Updated: 07/11/25 1309    Blood culture x two cultures. Draw prior to antibiotics. [[id number]]     Order Status: Canceled Specimen: Blood     Blood culture x two cultures. Draw prior to antibiotics. [[id number]]     Order Status: Canceled Specimen: Blood

## 2025-07-12 NOTE — ASSESSMENT & PLAN NOTE
Sepsis has resolved, deescalate antibiotics    Current Antibiotics    ceFEPIme injection 2 g, Every 8 hours (non-standard times), Intravenous    Lactate  Recent Labs   Lab 07/11/25  1305 07/12/25  0430   LACTATE  --  1.5   POCLAC 2.03  --

## 2025-07-12 NOTE — ASSESSMENT & PLAN NOTE
Antibiotics (From admission, onward)      Start     Stop Route Frequency Ordered    07/12/25 1315  ceFEPIme injection 2 g         -- IV Every 8 hours (non-standard times) 07/12/25 1201            Microbiology Results (last 7 days)       Procedure Component Value Units Date/Time    MRSA Screen by PCR [[id number]]     Order Status: Canceled Specimen: Nasal Swab     Blood culture x two cultures. Draw prior to antibiotics [[id number]]  (Normal) Collected: 07/11/25 1254    Order Status: Completed Specimen: Blood from Peripheral, Forearm, Left Updated: 07/11/25 2002     CULTURE, BLOOD ([facility]) No Growth After 6 Hours    Blood culture x two cultures. Draw prior to antibiotics [[id number]]  (Normal) Collected: 07/11/25 1259    Order Status: Completed Specimen: Blood from Peripheral, Hand, Left Updated: 07/11/25 2002     CULTURE, BLOOD ([facility]) No Growth After 6 Hours    Influenza A & B by Molecular [[id number]]  (Normal) Collected: 07/11/25 1409    Order Status: Completed Specimen: Nasopharyngeal Swab Updated: 07/11/25 1435     INFLUENZA A MOLECULAR Negative     INFLUENZA B MOLECULAR  Negative    Blood culture x two cultures. Draw prior to antibiotics. [[id number]]     Order Status: Canceled Specimen: Blood     Blood culture x two cultures. Draw prior to antibiotics. [[id number]]     Order Status: Canceled Specimen: Blood           Deescalate as above

## 2025-07-12 NOTE — PLAN OF CARE
[facility]  Initial Discharge Assessment       Primary Care Provider: No, Primary Doctor    Admission Diagnosis: Severe sepsis [A41.9, R65.20]  Pneumonia [J18.9]    Admission Date: 7/11/2025  Expected Discharge Date: 7/13/2025  SW met with patient at bedside to complete assessment. No POA, living will or advance directive. No HD, HH, DME or Coumadin. Patient's wife will bring him home when she is discharged. No needs identified.    Transition of Care Barriers: None    Payor: [employer] MANAGED MEDICARE / Plan: [employer] HMO PPO SPECIAL NEEDS / Product Type: Medicare Advantage /     Extended Emergency Contact Information  Primary Emergency Contact: Carey Lovelace  Address: [address] [place] [address] [place]  Work Phone: [phone]  Mobile Phone: [phone]  Relation: Daughter  Preferred language: English   needed? No  Secondary Emergency Contact: Danii Cristina  Relation: Spouse   needed? No    Discharge Plan A: Home with family  Discharge Plan B: Home with family      [facility]  [place], [place] - [address] [place]  [address] [address] [address]  Phone: [phone] Fax: [phone]    [id number] - [place], [place]  [address]  [address] & [address]  [address] [address]  Phone: [phone] Fax: [phone]      Initial Assessment (most recent)       Adult Discharge Assessment - 07/12/25 1620          Discharge Assessment    Assessment Type Discharge Planning Assessment     Confirmed/corrected address, phone number and insurance Yes     Confirmed Demographics Correct on Facesheet     Source of Information patient     Communicated JUNE with patient/caregiver Yes     People in Home spouse     Name(s) of People in Home Elsarani Fraire/spouse [phone]     Facility Arrived From: home     Do you expect to return to your current living situation? Yes     Do you have help at  home or someone to help you manage your care at home? Yes     Who are your caregiver(s) and their phone number(s)? Elsa Mustafacarina/spouse [phone]     Prior to hospitilization cognitive status: Alert/Oriented;No Deficits     Current cognitive status: Alert/Oriented;No Deficits     Walking or Climbing Stairs Difficulty no     Dressing/Bathing Difficulty no     Equipment Currently Used at Home none     Readmission within 30 days? No     Patient currently being followed by outpatient case management? No     Do you currently have service(s) that help you manage your care at home? No     Do you take prescription medications? Yes     Do you have prescription coverage? Yes     Do you have any problems affording any of your prescribed medications? No     Is the patient taking medications as prescribed? yes     Who is going to help you get home at discharge? Elsa Fraire/spouse [phone]     How do you get to doctors appointments? other (see comments)   cab    Are you on dialysis? No     Do you take coumadin? No     Discharge Plan A Home with family     Discharge Plan B Home with family     DME Needed Upon Discharge  none     Discharge Plan discussed with: Patient     Transition of Care Barriers None

## 2025-07-13 PROBLEM — D50.9 IRON DEFICIENCY ANEMIA: Status: ACTIVE | Noted: 2025-07-13

## 2025-07-13 LAB
ABSOLUTE EOSINOPHIL (SMH): 0.05 K/UL
ABSOLUTE MONOCYTE (SMH): 0.02 K/UL (ref 0.3–1)
ABSOLUTE NEUTROPHIL COUNT (SMH): 1.9 K/UL (ref 1.8–7.7)
ALBUMIN SERPL-MCNC: 2.6 G/DL (ref 3.5–5.2)
ALP SERPL-CCNC: 63 UNIT/L (ref 55–135)
ALT SERPL-CCNC: 14 UNIT/L (ref 10–44)
ANION GAP (SMH): 4 MMOL/L (ref 8–16)
AST SERPL-CCNC: 22 UNIT/L (ref 10–40)
BASOPHILS # BLD AUTO: 0.01 K/UL
BASOPHILS NFR BLD AUTO: 0.4 %
BILIRUB SERPL-MCNC: 1.4 MG/DL (ref 0.1–1)
BUN SERPL-MCNC: 13 MG/DL (ref 8–23)
CALCIUM SERPL-MCNC: 7.5 MG/DL (ref 8.7–10.5)
CHLORIDE SERPL-SCNC: 103 MMOL/L (ref 95–110)
CO2 SERPL-SCNC: 28 MMOL/L (ref 23–29)
CREAT SERPL-MCNC: 0.7 MG/DL (ref 0.5–1.4)
ERYTHROCYTE [DISTWIDTH] IN BLOOD BY AUTOMATED COUNT: 17.4 % (ref 11.5–14.5)
GFR SERPLBLD CREATININE-BSD FMLA CKD-EPI: >60 ML/MIN/1.73/M2
GLUCOSE SERPL-MCNC: 90 MG/DL (ref 70–110)
HCT VFR BLD AUTO: 23.6 % (ref 40–54)
HGB BLD-MCNC: 7.3 GM/DL (ref 14–18)
IMM GRANULOCYTES # BLD AUTO: 0.01 K/UL (ref 0–0.04)
IMM GRANULOCYTES NFR BLD AUTO: 0.4 % (ref 0–0.5)
LYMPHOCYTES # BLD AUTO: 0.39 K/UL (ref 1–4.8)
MAGNESIUM SERPL-MCNC: 1.5 MG/DL (ref 1.6–2.6)
MCH RBC QN AUTO: 24.1 PG (ref 27–31)
MCHC RBC AUTO-ENTMCNC: 30.9 G/DL (ref 32–36)
MCV RBC AUTO: 78 FL (ref 82–98)
NUCLEATED RBC (/100WBC) (SMH): 0 /100 WBC
OHS QRS DURATION: 130 MS
OHS QTC CALCULATION: 467 MS
PLATELET # BLD AUTO: 81 K/UL (ref 150–450)
PMV BLD AUTO: ABNORMAL FL
POTASSIUM SERPL-SCNC: 3.8 MMOL/L (ref 3.5–5.1)
PROT SERPL-MCNC: 5.4 GM/DL (ref 6–8.4)
RBC # BLD AUTO: 3.03 M/UL (ref 4.6–6.2)
RELATIVE EOSINOPHIL (SMH): 2.1 % (ref 0–8)
RELATIVE LYMPHOCYTE (SMH): 16.7 % (ref 18–48)
RELATIVE MONOCYTE (SMH): 0.9 % (ref 4–15)
RELATIVE NEUTROPHIL (SMH): 79.5 % (ref 38–73)
SODIUM SERPL-SCNC: 135 MMOL/L (ref 136–145)
WBC # BLD AUTO: 2.33 K/UL (ref 3.9–12.7)

## 2025-07-13 PROCEDURE — 82310 ASSAY OF CALCIUM: CPT | Performed by: INTERNAL MEDICINE

## 2025-07-13 PROCEDURE — 25000003 PHARM REV CODE 250

## 2025-07-13 PROCEDURE — 63600175 PHARM REV CODE 636 W HCPCS

## 2025-07-13 PROCEDURE — 99900031 HC PATIENT EDUCATION (STAT)

## 2025-07-13 PROCEDURE — 11000001 HC ACUTE MED/SURG PRIVATE ROOM

## 2025-07-13 PROCEDURE — 25000003 PHARM REV CODE 250: Performed by: HOSPITALIST

## 2025-07-13 PROCEDURE — 63600175 PHARM REV CODE 636 W HCPCS: Performed by: INTERNAL MEDICINE

## 2025-07-13 PROCEDURE — 99900035 HC TECH TIME PER 15 MIN (STAT)

## 2025-07-13 PROCEDURE — 36415 COLL VENOUS BLD VENIPUNCTURE: CPT | Performed by: INTERNAL MEDICINE

## 2025-07-13 PROCEDURE — 94761 N-INVAS EAR/PLS OXIMETRY MLT: CPT

## 2025-07-13 PROCEDURE — 83735 ASSAY OF MAGNESIUM: CPT | Performed by: INTERNAL MEDICINE

## 2025-07-13 PROCEDURE — 25000003 PHARM REV CODE 250: Performed by: INTERNAL MEDICINE

## 2025-07-13 PROCEDURE — 85025 COMPLETE CBC W/AUTO DIFF WBC: CPT | Performed by: INTERNAL MEDICINE

## 2025-07-13 RX ORDER — AMOXICILLIN AND CLAVULANATE POTASSIUM 875; 125 MG/1; MG/1
1 TABLET, FILM COATED ORAL EVERY 12 HOURS
Status: DISCONTINUED | OUTPATIENT
Start: 2025-07-13 | End: 2025-07-14

## 2025-07-13 RX ORDER — AMOXICILLIN 250 MG
1 CAPSULE ORAL 2 TIMES DAILY
Status: DISCONTINUED | OUTPATIENT
Start: 2025-07-13 | End: 2025-07-19 | Stop reason: HOSPADM

## 2025-07-13 RX ORDER — POLYETHYLENE GLYCOL 3350 17 G/17G
17 POWDER, FOR SOLUTION ORAL DAILY
Status: DISCONTINUED | OUTPATIENT
Start: 2025-07-13 | End: 2025-07-19 | Stop reason: HOSPADM

## 2025-07-13 RX ADMIN — ENOXAPARIN SODIUM 40 MG: 40 INJECTION SUBCUTANEOUS at 04:07

## 2025-07-13 RX ADMIN — CEFEPIME 2 G: 2 INJECTION, POWDER, FOR SOLUTION INTRAVENOUS at 01:07

## 2025-07-13 RX ADMIN — POLYETHYLENE GLYCOL 3350 17 G: 17 POWDER, FOR SOLUTION ORAL at 10:07

## 2025-07-13 RX ADMIN — PANTOPRAZOLE SODIUM 40 MG: 40 TABLET, DELAYED RELEASE ORAL at 09:07

## 2025-07-13 RX ADMIN — SENNOSIDES AND DOCUSATE SODIUM 1 TABLET: 50; 8.6 TABLET ORAL at 08:07

## 2025-07-13 RX ADMIN — AMOXICILLIN AND CLAVULANATE POTASSIUM 1 TABLET: 875; 125 TABLET, FILM COATED ORAL at 08:07

## 2025-07-13 RX ADMIN — CEFEPIME 2 G: 2 INJECTION, POWDER, FOR SOLUTION INTRAVENOUS at 04:07

## 2025-07-13 RX ADMIN — METHADONE HYDROCHLORIDE 110 MG: 10 TABLET ORAL at 09:07

## 2025-07-13 RX ADMIN — Medication 800 MG: at 10:07

## 2025-07-13 RX ADMIN — POTASSIUM BICARBONATE 50 MEQ: 977.5 TABLET, EFFERVESCENT ORAL at 06:07

## 2025-07-13 RX ADMIN — SENNOSIDES AND DOCUSATE SODIUM 1 TABLET: 50; 8.6 TABLET ORAL at 10:07

## 2025-07-13 RX ADMIN — Medication 800 MG: at 06:07

## 2025-07-13 NOTE — PLAN OF CARE
Problem: Infection  Goal: Absence of Infection Signs and Symptoms  Outcome: Progressing     Problem: Adult Inpatient Plan of Care  Goal: Plan of Care Review  Outcome: Progressing  Goal: Patient-Specific Goal (Individualized)  Outcome: Progressing  Goal: Absence of Hospital-Acquired Illness or Injury  Outcome: Progressing  Goal: Optimal Comfort and Wellbeing  Outcome: Progressing  Goal: Readiness for Transition of Care  Outcome: Progressing     Problem: Sepsis/Septic Shock  Goal: Optimal Coping  Outcome: Progressing  Goal: Absence of Bleeding  Outcome: Progressing  Goal: Blood Glucose Level Within Targeted Range  Outcome: Progressing  Goal: Absence of Infection Signs and Symptoms  Outcome: Progressing  Goal: Optimal Nutrition Intake  Outcome: Progressing     Problem: Pneumonia  Goal: Fluid Balance  Outcome: Progressing  Goal: Resolution of Infection Signs and Symptoms  Outcome: Progressing  Goal: Effective Oxygenation and Ventilation  Outcome: Progressing     Problem: Wound  Goal: Optimal Coping  Outcome: Progressing  Goal: Optimal Functional Ability  Outcome: Progressing  Goal: Absence of Infection Signs and Symptoms  Outcome: Progressing  Goal: Improved Oral Intake  Outcome: Progressing  Goal: Optimal Pain Control and Function  Outcome: Progressing  Goal: Skin Health and Integrity  Outcome: Progressing  Goal: Optimal Wound Healing  Outcome: Progressing

## 2025-07-13 NOTE — CARE UPDATE
07/12/25 2015   Patient Assessment/Suction   Level of Consciousness (AVPU) alert   Respiratory Effort Normal   Expansion/Accessory Muscles/Retractions no use of accessory muscles   PRE-TX-O2   Device (Oxygen Therapy) room air   Pulse Oximetry Type Continuous   $ Pulse Oximetry - Multiple Charge Pulse Oximetry - Multiple   Positioning   Head of Bed (HOB) Positioning HOB at 30 degrees   Positioning/Transfer Devices pillows;in use   Education   $ Education Other (see comment);15 min   Respiratory Evaluation   $ Care Plan Tech Time 15 min

## 2025-07-13 NOTE — CARE UPDATE
07/13/25 0755   Patient Assessment/Suction   Level of Consciousness (AVPU) alert   Respiratory Effort Unlabored   Expansion/Accessory Muscles/Retractions expansion symmetric;no retractions;no use of accessory muscles   Rhythm/Pattern, Respiratory depth regular;pattern regular;unlabored   PRE-TX-O2   Device (Oxygen Therapy) room air   SpO2 (!) 94 %   Pulse Oximetry Type Continuous   $ Pulse Oximetry - Multiple Charge Pulse Oximetry - Multiple   Pulse 82   Resp 16   Education   $ Education 15 min;Oxygen

## 2025-07-14 PROBLEM — E83.42 HYPOMAGNESEMIA: Status: ACTIVE | Noted: 2025-07-14

## 2025-07-14 PROBLEM — E87.1 HYPONATREMIA: Status: ACTIVE | Noted: 2025-07-14

## 2025-07-14 PROBLEM — D61.818 PANCYTOPENIA: Status: ACTIVE | Noted: 2025-07-14

## 2025-07-14 PROBLEM — E87.6 HYPOKALEMIA: Status: RESOLVED | Noted: 2021-02-13 | Resolved: 2025-07-14

## 2025-07-14 LAB
ABSOLUTE EOSINOPHIL (SMH): 0.08 K/UL
ABSOLUTE MONOCYTE (SMH): 0.16 K/UL (ref 0.3–1)
ABSOLUTE NEUTROPHIL COUNT (SMH): 1.9 K/UL (ref 1.8–7.7)
ALBUMIN SERPL-MCNC: 2.8 G/DL (ref 3.5–5.2)
ALP SERPL-CCNC: 67 UNIT/L (ref 55–135)
ALT SERPL-CCNC: 13 UNIT/L (ref 10–44)
ANION GAP (SMH): 6 MMOL/L (ref 8–16)
AST SERPL-CCNC: 17 UNIT/L (ref 10–40)
BASOPHILS # BLD AUTO: 0.01 K/UL
BASOPHILS NFR BLD AUTO: 0.4 %
BILIRUB SERPL-MCNC: 1.1 MG/DL (ref 0.1–1)
BUN SERPL-MCNC: 14 MG/DL (ref 8–23)
CALCIUM SERPL-MCNC: 7.8 MG/DL (ref 8.7–10.5)
CHLORIDE SERPL-SCNC: 99 MMOL/L (ref 95–110)
CO2 SERPL-SCNC: 27 MMOL/L (ref 23–29)
CREAT SERPL-MCNC: 0.6 MG/DL (ref 0.5–1.4)
ERYTHROCYTE [DISTWIDTH] IN BLOOD BY AUTOMATED COUNT: 17.8 % (ref 11.5–14.5)
GFR SERPLBLD CREATININE-BSD FMLA CKD-EPI: >60 ML/MIN/1.73/M2
GLUCOSE SERPL-MCNC: 76 MG/DL (ref 70–110)
HCT VFR BLD AUTO: 25.7 % (ref 40–54)
HGB BLD-MCNC: 7.9 GM/DL (ref 14–18)
IMM GRANULOCYTES # BLD AUTO: 0.02 K/UL (ref 0–0.04)
IMM GRANULOCYTES NFR BLD AUTO: 0.8 % (ref 0–0.5)
LYMPHOCYTES # BLD AUTO: 0.34 K/UL (ref 1–4.8)
MAGNESIUM SERPL-MCNC: 1.6 MG/DL (ref 1.6–2.6)
MCH RBC QN AUTO: 23.8 PG (ref 27–31)
MCHC RBC AUTO-ENTMCNC: 30.7 G/DL (ref 32–36)
MCV RBC AUTO: 77 FL (ref 82–98)
NUCLEATED RBC (/100WBC) (SMH): 0 /100 WBC
PLATELET # BLD AUTO: 102 K/UL (ref 150–450)
PLATELET BLD QL SMEAR: ABNORMAL
PMV BLD AUTO: ABNORMAL FL
POTASSIUM SERPL-SCNC: 4.4 MMOL/L (ref 3.5–5.1)
PROCALCITONIN SERPL-MCNC: 11.34 NG/ML
PROT SERPL-MCNC: 6 GM/DL (ref 6–8.4)
RBC # BLD AUTO: 3.32 M/UL (ref 4.6–6.2)
RELATIVE EOSINOPHIL (SMH): 3.2 % (ref 0–8)
RELATIVE LYMPHOCYTE (SMH): 13.5 % (ref 18–48)
RELATIVE MONOCYTE (SMH): 6.4 % (ref 4–15)
RELATIVE NEUTROPHIL (SMH): 75.7 % (ref 38–73)
SODIUM SERPL-SCNC: 132 MMOL/L (ref 136–145)
WBC # BLD AUTO: 2.51 K/UL (ref 3.9–12.7)

## 2025-07-14 PROCEDURE — 25000003 PHARM REV CODE 250: Performed by: HOSPITALIST

## 2025-07-14 PROCEDURE — 85025 COMPLETE CBC W/AUTO DIFF WBC: CPT | Performed by: INTERNAL MEDICINE

## 2025-07-14 PROCEDURE — 83735 ASSAY OF MAGNESIUM: CPT | Performed by: INTERNAL MEDICINE

## 2025-07-14 PROCEDURE — 94761 N-INVAS EAR/PLS OXIMETRY MLT: CPT

## 2025-07-14 PROCEDURE — 82310 ASSAY OF CALCIUM: CPT | Performed by: INTERNAL MEDICINE

## 2025-07-14 PROCEDURE — 99900031 HC PATIENT EDUCATION (STAT)

## 2025-07-14 PROCEDURE — 63600175 PHARM REV CODE 636 W HCPCS: Performed by: HOSPITALIST

## 2025-07-14 PROCEDURE — 99900035 HC TECH TIME PER 15 MIN (STAT)

## 2025-07-14 PROCEDURE — 87040 BLOOD CULTURE FOR BACTERIA: CPT | Performed by: HOSPITALIST

## 2025-07-14 PROCEDURE — 84145 PROCALCITONIN (PCT): CPT | Performed by: HOSPITALIST

## 2025-07-14 PROCEDURE — 36415 COLL VENOUS BLD VENIPUNCTURE: CPT | Performed by: HOSPITALIST

## 2025-07-14 PROCEDURE — 25000003 PHARM REV CODE 250

## 2025-07-14 PROCEDURE — 97162 PT EVAL MOD COMPLEX 30 MIN: CPT

## 2025-07-14 PROCEDURE — 63600175 PHARM REV CODE 636 W HCPCS: Performed by: INTERNAL MEDICINE

## 2025-07-14 PROCEDURE — 11000001 HC ACUTE MED/SURG PRIVATE ROOM

## 2025-07-14 PROCEDURE — 94760 N-INVAS EAR/PLS OXIMETRY 1: CPT

## 2025-07-14 PROCEDURE — 97116 GAIT TRAINING THERAPY: CPT

## 2025-07-14 PROCEDURE — 25000003 PHARM REV CODE 250: Performed by: INTERNAL MEDICINE

## 2025-07-14 RX ORDER — LINEZOLID 2 MG/ML
600 INJECTION, SOLUTION INTRAVENOUS
Status: DISCONTINUED | OUTPATIENT
Start: 2025-07-14 | End: 2025-07-15

## 2025-07-14 RX ADMIN — Medication 800 MG: at 06:07

## 2025-07-14 RX ADMIN — LINEZOLID 600 MG: 600 INJECTION, SOLUTION INTRAVENOUS at 10:07

## 2025-07-14 RX ADMIN — PIPERACILLIN SODIUM AND TAZOBACTAM SODIUM 4.5 G: 4; .5 INJECTION, POWDER, LYOPHILIZED, FOR SOLUTION INTRAVENOUS at 05:07

## 2025-07-14 RX ADMIN — ACETAMINOPHEN 650 MG: 325 TABLET ORAL at 10:07

## 2025-07-14 RX ADMIN — ENOXAPARIN SODIUM 40 MG: 40 INJECTION SUBCUTANEOUS at 05:07

## 2025-07-14 RX ADMIN — PIPERACILLIN SODIUM AND TAZOBACTAM SODIUM 4.5 G: 4; .5 INJECTION, POWDER, LYOPHILIZED, FOR SOLUTION INTRAVENOUS at 10:07

## 2025-07-14 RX ADMIN — LINEZOLID 600 MG: 600 INJECTION, SOLUTION INTRAVENOUS at 09:07

## 2025-07-14 RX ADMIN — SENNOSIDES AND DOCUSATE SODIUM 1 TABLET: 50; 8.6 TABLET ORAL at 08:07

## 2025-07-14 RX ADMIN — POLYETHYLENE GLYCOL 3350 17 G: 17 POWDER, FOR SOLUTION ORAL at 09:07

## 2025-07-14 RX ADMIN — SENNOSIDES AND DOCUSATE SODIUM 1 TABLET: 50; 8.6 TABLET ORAL at 09:07

## 2025-07-14 RX ADMIN — Medication: at 08:07

## 2025-07-14 RX ADMIN — ACETAMINOPHEN 650 MG: 325 TABLET ORAL at 04:07

## 2025-07-14 RX ADMIN — METHADONE HYDROCHLORIDE 110 MG: 10 TABLET ORAL at 09:07

## 2025-07-14 RX ADMIN — PANTOPRAZOLE SODIUM 40 MG: 40 TABLET, DELAYED RELEASE ORAL at 06:07

## 2025-07-14 RX ADMIN — Medication 800 MG: at 09:07

## 2025-07-14 NOTE — ASSESSMENT & PLAN NOTE
This patient is found to have pancytopenia, the likely etiology is Drug induced (including chemotherapy) related to medication for his Crohn's disease, will monitor CBC Daily. Will transfuse red blood cells if the hemoglobin is <7g/dL (or <8 in the setting of ACS). Will transfuse platelets if platelet count is <10k. Hold DVT prophylaxis if platelets are <50k. The patient's hemoglobin, white blood cell count, and platelet count results have been reviewed and are listed below.  Recent Labs   Lab 07/14/25  0358   HGB 7.9*   WBC 2.51*   *

## 2025-07-14 NOTE — ASSESSMENT & PLAN NOTE
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 The patient's most recent sodium results are listed below.  Recent Labs     07/12/25  0659 07/13/25  0348 07/14/25  0358   * 135* 132*     Plan  - Correct the sodium by 4-6mEq in 24 hours.   - Monitor sodium Daily.   - Patient hyponatremia is worsening. Will continue current treatment

## 2025-07-14 NOTE — ASSESSMENT & PLAN NOTE
Anemia is likely due to Iron deficiency and chronic disease due to crohn's. Most recent hemoglobin and hematocrit are listed below.  Recent Labs     07/12/25  0659 07/13/25  0348 07/14/25  0358   HGB 6.6* 7.3* 7.9*   HCT 21.7* 23.6* 25.7*     Plan  - Monitor serial CBC: Daily  - Transfuse PRBC if patient becomes hemodynamically unstable, symptomatic or H/H drops below 7/21.  - Patient has received 1 units of PRBCs on 7/12/25  - Patient's anemia is currently stable

## 2025-07-14 NOTE — PT/OT/SLP EVAL
Physical Therapy Evaluation    Patient Name:  Stevie Cristina   MRN:  [id number]    Recommendations:     Discharge Recommendations: Low Intensity Therapy (Home Health PT)   Discharge Equipment Recommendations: cane, straight   Barriers to discharge: None    Assessment:     Stevie Cristina is a 70 y.o. male admitted with a medical diagnosis of Acute pneumonia.  He presents with the following impairments/functional limitations: weakness, impaired endurance, impaired functional mobility, gait instability.    Agreeable to therapy evaluation. Performed bed mobility Mod.I. Performed functional transfers SBA. Ambulated SBA without an AD x 200'. Increased lateral weight shift, decreased heelstrike noted bilaterally. Returned to room. Deferred sitting in chair.     Rehab Prognosis: Good; patient would benefit from acute skilled PT services to address these deficits and reach maximum level of function.    Recent Surgery: * No surgery found *      Plan:     During this hospitalization, patient to be seen 5 x/week to address the identified rehab impairments via gait training, therapeutic activities, therapeutic exercises and progress toward the following goals:    Plan of Care Expires:  08/14/25    Subjective     Chief Complaint: Patient reports (B) heel tenderness when ambulating without his shoes.   Patient/Family Comments/goals: To return home  Pain/Comfort:  Pain Rating 1: 0/10    Patients cultural, spiritual, Temple conflicts given the current situation: no    Living Environment:  Home with family in [facility]. 3 [address] with grab bars on home to Assist with stairs.   Prior to admission, patients level of function was Independent at a community level; driving; walking 2-3 miles per day.  Equipment used at home: none.  DME owned (not currently used): none.  Upon discharge, patient will have assistance from family.    Objective:     Communicated with nursing prior to session.  Patient found supine with bed alarm,  peripheral IV, telemetry  upon PT entry to room.    General Precautions: Standard, fall  Orthopedic Precautions:N/A   Braces: N/A  Respiratory Status: Room air    Exams:  RLE ROM: WFL  RLE Strength: Hip: 4+/5, knee/ankle: 5/5  LLE ROM: WFL except ankle to neutral  LLE Strength: Grossly 4+/5    Functional Mobility:  Bed Mobility:     Rolling Right: modified independence  Supine to Sit: modified independence  Sit to Supine: modified independence  Transfers:     Sit to Stand:  stand by assistance with no AD  Gait: SBA without an AD x 200'  Balance: sitting: Independent, standing: SBA      AM-PAC 6 CLICK MOBILITY  Total Score:22       Treatment & Education:  Pt educated on POC, discharge recommendation, need for assist with mobility, use of call bell to seek assistance as needed and fall prevention     Patient left supine with all lines intact, call button in reach, and bed alarm on.    GOALS:   Multidisciplinary Problems       Physical Therapy Goals          Problem: Physical Therapy    Goal Priority Disciplines Outcome Interventions   Physical Therapy Goal     PT, PT/OT     Description: Goals to be met by: 25     Patient will increase functional independence with mobility by performin. Sit to stand transfer with Modified [place]  2. Gait  x 200 feet with Modified [place] using Single-point Cane.   3. Ascend/descend 3 stair with right Handrails Stand-by Assistance using Single-point Cane .                          DME Justifications:   Stevie's mobility limitation cannot be sufficiently resolved without an assistive device. His functional mobility deficit can be sufficiently resolved with the use of a straight cane. Patient's mobility limitation significantly impairs their ability to participate in one of more activities of daily living.  The use of a straight cane will significantly improve the patient's ability to participate in MRADLS and the patient will use it on regular basis in the  home.    History:     Past Medical History:   Diagnosis Date    Colon polyp     Crohn disease     Hard of hearing     Hepatitis C     Hepatitis C     Hypertension     TB lung, latent        Past Surgical History:   Procedure Laterality Date    ABDOMINAL SURGERY      BOWEL RESECTION    APPENDECTOMY      CHOLECYSTECTOMY         Time Tracking:     PT Received On: 07/14/25  PT Start Time: 1310     PT Stop Time: 1333  PT Total Time (min): 23 min     Billable Minutes: Evaluation 15 and Gait Training 8      07/14/2025

## 2025-07-14 NOTE — ASSESSMENT & PLAN NOTE
Anemia is likely due to Iron deficiency and chronic disease due to crohn's. Most recent hemoglobin and hematocrit are listed below.  Recent Labs     07/12/25  0430 07/12/25  0659 07/13/25  0348   HGB 7.0* 6.6* 7.3*   HCT 22.2* 21.7* 23.6*     Plan  - Monitor serial CBC: Daily  - Transfuse PRBC if patient becomes hemodynamically unstable, symptomatic or H/H drops below 7/21.  - Patient has received 1 units of PRBCs on 7/12/25  - Patient's anemia is currently stable  - check h/h n am

## 2025-07-14 NOTE — ASSESSMENT & PLAN NOTE
Patient has a diagnosis of pneumonia. The cause of the pneumonia is suspected to be bacterial in etiology but organism is not known. The pneumonia is worsening due to fever and hypoxia. The patient has the following signs/symptoms of pneumonia: persistent hypoxia , cough, and shortness of breath. The patient does have a current oxygen requirement and the patient does not have a home oxygen requirement. I have reviewed the pertinent imaging. The following cultures have been collected: Blood cultures and Sputum culture The culture results are listed below.     Current antimicrobial regimen consists of the antibiotics listed below. Will monitor patient closely and Adjust treatment plan as follows escalate antibiotics to include linezolid and Zosyn    Antibiotics (From admission, onward)      Start     Stop Route Frequency Ordered    07/14/25 1000  piperacillin-tazobactam (ZOSYN) 4.5 g in D5W 100 mL IVPB (MB+)         -- IV Every 8 hours (non-standard times) 07/14/25 0830    07/14/25 0900  linezolid 600 mg/300 mL IVPB 600 mg         -- IV Every 12 hours (non-standard times) 07/14/25 0830

## 2025-07-14 NOTE — PLAN OF CARE
POC reviewed with patient this shift.  A/O x4.  Respirations unlabored on RA.  Skin w/d.  Continent of b/b.  Urinal at bedside.  No BM this shift but passing flatus.  Tolerates meds whole with water without difficulty.  VSS.  See flowsheet for full assessment.  Able to verbalize wants/needs.  No s/s of distress.  Fall/safety precautions maintained.      Problem: Infection  Goal: Absence of Infection Signs and Symptoms  Outcome: Progressing     Problem: Adult Inpatient Plan of Care  Goal: Plan of Care Review  Outcome: Progressing     Problem: Sepsis/Septic Shock  Goal: Optimal Nutrition Intake  Outcome: Progressing     Problem: Pneumonia  Goal: Effective Oxygenation and Ventilation  Outcome: Progressing

## 2025-07-14 NOTE — CONSULTS
07/14/25 1412   WOCN Assessment   WOCN Total Time (mins) 30   Visit Date 07/14/25   Visit Time 1141   Consult Type New   WOCN Speciality Wound   Wound pressure;skin tear   Intervention assessed;applied;orders;coordination of care   Teaching on-going   Skin Interventions   Device Skin Pressure Protection absorbent pad utilized/changed;pressure points protected   Pressure Reduction Devices alternating pressure pump (RIGOBERTO);positioning supports utilized   Pressure Reduction Techniques frequent weight shift encouraged   Skin Protection incontinence pads utilized;skin sealant/moisture barrier applied   Positioning   Body Position position changed independently;supine   Head of Bed (HOB) Positioning HOB elevated;HOB at 20-30 degrees   Pressure Injury Prevention    Check Moisture Management Pad Done   Sacral Foam Dressing Remove   Heel protection technique Pillow   Check Medical Devices Done        Wound 07/11/25 1830 Traumatic Left anterior Leg   Date First Assessed/Time First Assessed: 07/11/25 1830   Present on Original Admission: Yes  Primary Wound Type: (c) Traumatic  Side: Left  Orientation: anterior  Location: Leg   Wound Image    Dressing Appearance Dried drainage;Clean   Drainage Amount None   Drainage Characteristics/Odor No odor   Appearance Pink;Yellow   Tissue loss description Full thickness   Red (%), Wound Tissue Color 50 %   Yellow (%), Wound Tissue Color 50 %   Periwound Area [place]   Wound Edges Open   Wound Length (cm) 1.5 cm   Wound Width (cm) 1.4 cm   Wound Depth (cm) 0.3 cm   Wound Volume (cm^3) 0.33 cm^3   Wound Surface Area (cm^2) 1.65 cm^2   Care Cleansed with:;Antimicrobial agent   Dressing Applied;Foam;Silicone;Silver;Other (comment)  (Xeroform)   Off Loading Pillow   Dressing Change Due 07/15/25        Wound 07/11/25 Pressure Injury medial Sacral spine   Date First Assessed: 07/11/25   Present on Original Admission: Yes  Primary Wound Type: Pressure Injury  Orientation: medial  Location: Sacral  spine   Wound Image    Pressure Injury Stage 2   Dressing Appearance Open to air   Drainage Amount None   Drainage Characteristics/Odor No odor   Appearance Pink;Red;White   Tissue loss description Partial thickness   Periwound Area Pink   Wound Edges Irregular;Undefined   Care Cleansed with:;Soap and water;Antimicrobial agent   Dressing Applied;Paste;Other (comment)  (Triad)   Off Loading Pillow   Dressing Change Due 07/15/25        Wound 07/11/25 Traumatic Left distal;anterior;medial Leg #2   Date First Assessed: 07/11/25   Present on Original Admission: Yes  Primary Wound Type: Traumatic  Side: Left  Orientation: distal;anterior;medial  Location: Leg  Wound Number: #2   Wound Image    Dressing Appearance Dried drainage;Intact   Drainage Amount Scant   Drainage Characteristics/Odor Serous   Appearance Pink;Moist;Red   Tissue loss description Full thickness   Red (%), Wound Tissue Color 100 %   Wound Edges Open;Undefined   Wound Length (cm) 1.4 cm   Wound Width (cm) 0.9 cm   Wound Surface Area (cm^2) 0.99 cm^2   Care Cleansed with:;Antimicrobial agent   Dressing Applied;Foam;Silver;Silicone;Other (comment)  (Xeroform)   Off Loading Pillow   Dressing Change Due 07/15/25        Wound 07/11/25 Traumatic Left distal;anterior;lateral Leg #3   Date First Assessed: 07/11/25   Present on Original Admission: Yes  Primary Wound Type: Traumatic  Side: Left  Orientation: distal;anterior;lateral  Location: Leg  Wound Number: #3   Wound Image    Dressing Appearance Dried drainage;Dry   Drainage Amount Scant   Drainage Characteristics/Odor Serous   Appearance Pink;Tan;Yellow   Tissue loss description Full thickness   Red (%), Wound Tissue Color 50 %   Yellow (%), Wound Tissue Color 50 %   Wound Edges Open   Wound Length (cm) 4.2 cm   Wound Width (cm) 2.4 cm   Wound Depth (cm) 0.4 cm   Wound Volume (cm^3) 2.111 cm^3   Wound Surface Area (cm^2) 7.92 cm^2   Care Cleansed with:;Antimicrobial agent   Dressing  Applied;Foam;Silicone;Silver;Other (comment)  (Xeroform)   Off Loading Pillow   Dressing Change Due 07/15/25   R AC FossaR aRML eLBOWbACKButtocksThighsR heelR footR thighLegs  POC reviewed with pt. Pt states he receives outpt wound care with [facility] and able to recall plan for wounds.  Pt heels floated on pillows. Pt states he suffers from frequent falls at home.  Call light within reach and bed alarm active.

## 2025-07-14 NOTE — SUBJECTIVE & OBJECTIVE
Interval History:  Patient feels better but still appears symptomatic with low grade fever. Plan to switch to oral antibiotic. Monitor and if h/h stable can plan for dc tomorrow. Ok to transfer out stepdown.     Review of Systems   All other systems reviewed and are negative.    Objective:     Vital Signs (Most Recent):  Temp: 98.3 °F (36.8 °C) (07/13/25 1919)  Pulse: 71 (07/13/25 1952)  Resp: (!) 21 (07/13/25 1952)  BP: (!) 125/59 (07/13/25 1952)  SpO2: 99 % (07/13/25 1952) Vital Signs (24h Range):  Temp:  [98.3 °F (36.8 °C)-100.1 °F (37.8 °C)] 98.3 °F (36.8 °C)  Pulse:  [68-82] 71  Resp:  [13-24] 21  SpO2:  [93 %-99 %] 99 %  BP: (117-145)/(59-79) 125/59     Weight: 93.8 kg (206 lb 12.7 oz)  Body mass index is 27.28 kg/m².    Intake/Output Summary (Last 24 hours) at 7/13/2025 2050  Last data filed at 7/13/2025 1804  Gross per 24 hour   Intake 760 ml   Output 2425 ml   Net -1665 ml         Physical Exam  Vitals and nursing note reviewed.   Constitutional:       Appearance: He is well-developed.   HENT:      Head: Atraumatic.      Right Ear: External ear normal.      Left Ear: External ear normal.      Nose: Nose normal.   Eyes:      Extraocular Movements: Extraocular movements intact.   Cardiovascular:      Rate and Rhythm: Normal rate and regular rhythm.   Pulmonary:      Effort: Pulmonary effort is normal.   Abdominal:      Palpations: Abdomen is soft.   Musculoskeletal:         General: Normal range of motion.      Cervical back: Full passive range of motion without pain and normal range of motion.   Skin:     General: Skin is warm.      Comments: LLE wound   Neurological:      Mental Status: He is alert and oriented to person, place, and time.   Psychiatric:         Behavior: Behavior normal.               Significant Labs: All pertinent labs within the past 24 hours have been reviewed.  BMP:   Recent Labs   Lab 07/13/25  0348   GLU 90   *   K 3.8      CO2 28   BUN 13   CREATININE 0.7   CALCIUM 7.5*    MG 1.5*     CBC:   Recent Labs   Lab 07/12/25  0430 07/12/25  0659 07/13/25  0348   WBC 2.40* 2.38* 2.33*   HGB 7.0* 6.6* 7.3*   HCT 22.2* 21.7* 23.6*   PLT 76* 71* 81*       Significant Imaging: I have reviewed all pertinent imaging results/findings within the past 24 hours.

## 2025-07-14 NOTE — PROGRESS NOTES
[facility]  Progress Note    Patient Name: Stevie Cristina  MRN: [id number]  Patient Class: IP- Inpatient   Admission Date: 7/11/2025  Length of Stay: 2 days  Attending Physician: Sergey Gallardo MD  Primary Care Provider: [place], Primary Doctor        Subjective     Principal Problem:Severe sepsis    HPI:  70 year old pt getting admitted with severe sepsis/pneumonia  Pt overall is a poor historian and was not confused when saw in ER  He said he started having Nausea/Vomiting/diarrhea to ER physician but denied this c/o to me  He said he only had fever/chills/cough  Imaging showed evidence of pneumonia   His troponin levels were elevated but denied chest pain  Pt will be admitted under observation status as per  instruction     Overview/Hospital Course:  70-year-old gentleman presents to the emergency department with complaints of fever, chills and dyspnea.  Imaging consistent with pneumonia patient is started on broad-spectrum antibiotics.  Patient noted to be in severe sepsis on presentation which quickly resolved after 1 day of antibiotics.  Patient quickly defervesced.Found to have drop in H/H with history of crohn's disease. He received 1 unti of PRBCs. No evidence of active bleeding. Still having low grade temps.     Interval History:  Patient seen and examined.  Patient has spiked fever of 101.3 overnight.  Remains afebrile this morning.  Plan of care discussed with the patient at the bedside in detail.    Review of Systems  Objective:     Vital Signs (Most Recent):  Temp: 98.9 °F (37.2 °C) (07/14/25 0715)  Pulse: 71 (07/14/25 0715)  Resp: 17 (07/14/25 0933)  BP: 134/66 (07/14/25 0715)  SpO2: 96 % (07/14/25 0715) Vital Signs (24h Range):  Temp:  [98.3 °F (36.8 °C)-101.3 °F (38.5 °C)] 98.9 °F (37.2 °C)  Pulse:  [70-80] 71  Resp:  [15-24] 17  SpO2:  [94 %-99 %] 96 %  BP: (117-135)/(59-79) 134/66     Weight: 93.8 kg (206 lb 12.7 oz)  Body mass index is 27.28  kg/m².    Intake/Output Summary (Last 24 hours) at 7/14/2025 1202  Last data filed at 7/14/2025 1047  Gross per 24 hour   Intake 1080 ml   Output 3225 ml   Net -2145 ml         Physical Exam  Vitals and nursing note reviewed.   Eyes:      Pupils: Pupils are equal, round, and reactive to light.   Neck:      Vascular: No JVD.   Cardiovascular:      Rate and Rhythm: Normal rate and regular rhythm.      Heart sounds: Normal heart sounds.   Pulmonary:      Effort: Pulmonary effort is normal.      Breath sounds: Normal breath sounds.   Abdominal:      General: Bowel sounds are normal. There is no distension.      Palpations: Abdomen is soft.      Tenderness: There is no abdominal tenderness.   Musculoskeletal:         General: No deformity.   Lymphadenopathy:      Cervical: No cervical adenopathy.   Skin:     Coloration: Skin is not pale.      Findings: No rash.               Significant Labs: All pertinent labs within the past 24 hours have been reviewed.    Significant Imaging: I have reviewed all pertinent imaging results/findings within the past 24 hours.      Assessment & Plan  Acute pneumonia  Patient has a diagnosis of pneumonia. The cause of the pneumonia is suspected to be bacterial in etiology but organism is not known. The pneumonia is worsening due to fever and hypoxia. The patient has the following signs/symptoms of pneumonia: persistent hypoxia , cough, and shortness of breath. The patient does have a current oxygen requirement and the patient does not have a home oxygen requirement. I have reviewed the pertinent imaging. The following cultures have been collected: Blood cultures and Sputum culture The culture results are listed below.     Current antimicrobial regimen consists of the antibiotics listed below. Will monitor patient closely and Adjust treatment plan as follows escalate antibiotics to include linezolid and Zosyn    Antibiotics (From admission, onward)      Start     Stop Route Frequency Ordered     07/14/25 1000  piperacillin-tazobactam (ZOSYN) 4.5 g in D5W 100 mL IVPB (MB+)         -- IV Every 8 hours (non-standard times) 07/14/25 0830    07/14/25 0900  linezolid 600 mg/300 mL IVPB 600 mg         -- IV Every 12 hours (non-standard times) 07/14/25 0830            Crohn's disease  Noted, chronic Crohn disease.  No evidence of acute exacerbation currently.  Continue to monitor.  Open wound of left lower leg  Chronic issue for 2 years   Follow up outpatient wound care   Methadone dependence  Aware     Iron deficiency anemia  Anemia is likely due to Iron deficiency and chronic disease due to crohn's. Most recent hemoglobin and hematocrit are listed below.  Recent Labs     07/12/25  0659 07/13/25  0348 07/14/25  0358   HGB 6.6* 7.3* 7.9*   HCT 21.7* 23.6* 25.7*     Plan  - Monitor serial CBC: Daily  - Transfuse PRBC if patient becomes hemodynamically unstable, symptomatic or H/H drops below 7/21.  - Patient has received 1 units of PRBCs on 7/12/25  - Patient's anemia is currently stable  Hypomagnesemia  Patient has Abnormal Magnesium: hypomagnesemia. Will continue to monitor electrolytes closely. Will replace the affected electrolytes and repeat labs to be done after interventions completed. The patient's magnesium results have been reviewed and are listed below.  Recent Labs   Lab 07/14/25  0358   MG 1.6      Hyponatremia  Hyponatremia is likely due to SIADH secondary to pneumonia. The patient's most recent sodium results are listed below.  Recent Labs     07/12/25  0659 07/13/25 0348 07/14/25  0358   * 135* 132*     Plan  - Correct the sodium by 4-6mEq in 24 hours.   - Monitor sodium Daily.   - Patient hyponatremia is worsening. Will continue current treatment  Pancytopenia  This patient is found to have pancytopenia, the likely etiology is Drug induced (including chemotherapy) related to medication for his Crohn's disease, will monitor CBC Daily. Will transfuse red blood cells if the hemoglobin is  <7g/dL (or <8 in the setting of ACS). Will transfuse platelets if platelet count is <10k. Hold DVT prophylaxis if platelets are <50k. The patient's hemoglobin, white blood cell count, and platelet count results have been reviewed and are listed below.  Recent Labs   Lab 07/14/25  0358   HGB 7.9*   WBC 2.51*   *           VTE Risk Mitigation (From admission, onward)           Ordered     enoxaparin injection 40 mg  Daily         07/11/25 2045     IP VTE HIGH RISK PATIENT  Once         07/11/25 1704     Place sequential compression device  Until discontinued         07/11/25 1704                    Discharge Planning   JUNE: 7/15/2025     Code Status: Full Code   Medical Readiness for Discharge Date:   Discharge Plan A: Home with family          Please place Justification for DME      Sergey Gallardo MD  Department of Hospital Medicine   [facility]

## 2025-07-14 NOTE — CARE UPDATE
07/13/25 2035   Patient Assessment/Suction   Level of Consciousness (AVPU) alert   Respiratory Effort Normal   Expansion/Accessory Muscles/Retractions no use of accessory muscles   PRE-TX-O2   Device (Oxygen Therapy) room air   Pulse Oximetry Type Continuous   $ Pulse Oximetry - Multiple Charge Pulse Oximetry - Multiple   Positioning HOB elevated 30 degrees   Positioning   Positioning/Transfer Devices pillows;in use   Education   $ Education Other (see comment);15 min   Respiratory Evaluation   $ Care Plan Tech Time 15 min

## 2025-07-14 NOTE — PROGRESS NOTES
[facility] Medicine  Progress Note    Patient Name: Stevie Cristina  MRN: [id number]  Patient Class: IP- Inpatient   Admission Date: 7/11/2025  Length of Stay: 1 days  Attending Physician: Vianca Glover MD  Primary Care Provider: Temitope, Primary Doctor        Subjective     Principal Problem:Severe sepsis        HPI:  70 year old pt getting admitted with severe sepsis/pneumonia  Pt overall is a poor historian and was not confused when saw in ER  He said he started having Nausea/Vomiting/diarrhea to ER physician but denied this c/o to me  He said he only had fever/chills/cough  Imaging showed evidence of pneumonia   His troponin levels were elevated but denied chest pain  Pt will be admitted under observation status as per  instruction     Overview/Hospital Course:  70-year-old gentleman presents to the emergency department with complaints of fever, chills and dyspnea.  Imaging consistent with pneumonia patient is started on broad-spectrum antibiotics.  Patient noted to be in severe sepsis on presentation which quickly resolved after 1 day of antibiotics.  Patient quickly defervesced.Found to have drop in H/H with history of crohn's disease. He received 1 unti of PRBCs. No evidence of active bleeding. Still having low grade temps.     Interval History:  Patient feels better but still appears symptomatic with low grade fever. Plan to switch to oral antibiotic. Monitor and if h/h stable can plan for dc tomorrow. Ok to transfer out stepdown.     Review of Systems   All other systems reviewed and are negative.    Objective:     Vital Signs (Most Recent):  Temp: 98.3 °F (36.8 °C) (07/13/25 1919)  Pulse: 71 (07/13/25 1952)  Resp: (!) 21 (07/13/25 1952)  BP: (!) 125/59 (07/13/25 1952)  SpO2: 99 % (07/13/25 1952) Vital Signs (24h Range):  Temp:  [98.3 °F (36.8 °C)-100.1 °F (37.8 °C)] 98.3 °F (36.8 °C)  Pulse:  [68-82] 71  Resp:  [13-24] 21  SpO2:  [93 %-99 %] 99 %  BP:  (117-145)/(59-79) 125/59     Weight: 93.8 kg (206 lb 12.7 oz)  Body mass index is 27.28 kg/m².    Intake/Output Summary (Last 24 hours) at 7/13/2025 2050  Last data filed at 7/13/2025 1804  Gross per 24 hour   Intake 760 ml   Output 2425 ml   Net -1665 ml         Physical Exam  Vitals and nursing note reviewed.   Constitutional:       Appearance: He is well-developed.   HENT:      Head: Atraumatic.      Right Ear: External ear normal.      Left Ear: External ear normal.      Nose: Nose normal.   Eyes:      Extraocular Movements: Extraocular movements intact.   Cardiovascular:      Rate and Rhythm: Normal rate and regular rhythm.   Pulmonary:      Effort: Pulmonary effort is normal.   Abdominal:      Palpations: Abdomen is soft.   Musculoskeletal:         General: Normal range of motion.      Cervical back: Full passive range of motion without pain and normal range of motion.   Skin:     General: Skin is warm.      Comments: LLE wound   Neurological:      Mental Status: He is alert and oriented to person, place, and time.   Psychiatric:         Behavior: Behavior normal.               Significant Labs: All pertinent labs within the past 24 hours have been reviewed.  BMP:   Recent Labs   Lab 07/13/25  0348   GLU 90   *   K 3.8      CO2 28   BUN 13   CREATININE 0.7   CALCIUM 7.5*   MG 1.5*     CBC:   Recent Labs   Lab 07/12/25  0430 07/12/25  0659 07/13/25  0348   WBC 2.40* 2.38* 2.33*   HGB 7.0* 6.6* 7.3*   HCT 22.2* 21.7* 23.6*   PLT 76* 71* 81*       Significant Imaging: I have reviewed all pertinent imaging results/findings within the past 24 hours.      Assessment & Plan  Acute pneumonia      Antibiotics (From admission, onward)      Start     Stop Route Frequency Ordered    07/13/25 2100  amoxicillin-clavulanate 875-125mg per tablet 1 tablet         -- Oral Every 12 hours 07/13/25 1633            Microbiology Results (last 7 days)       Procedure Component Value Units Date/Time    Blood culture x two  cultures. Draw prior to antibiotics [[id number]]  (Normal) Collected: 07/11/25 1254    Order Status: Completed Specimen: Blood from Peripheral, Forearm, Left Updated: 07/13/25 1401     CULTURE, BLOOD ([facility]) No Growth After 48 Hours    Blood culture x two cultures. Draw prior to antibiotics [[id number]]  (Normal) Collected: 07/11/25 1259    Order Status: Completed Specimen: Blood from Peripheral, Hand, Left Updated: 07/13/25 1401     CULTURE, BLOOD ([facility]) No Growth After 48 Hours    MRSA Screen by PCR [[id number]]     Order Status: Canceled Specimen: Nasal Swab     Influenza A & B by Molecular [[id number]]  (Normal) Collected: 07/11/25 1409    Order Status: Completed Specimen: Nasopharyngeal Swab Updated: 07/11/25 1435     INFLUENZA A MOLECULAR Negative     INFLUENZA B MOLECULAR  Negative    Blood culture x two cultures. Draw prior to antibiotics. [[id number]]     Order Status: Canceled Specimen: Blood     Blood culture x two cultures. Draw prior to antibiotics. [[id number]]     Order Status: Canceled Specimen: Blood           Deescalate as above  Home O2 eval   Crohn's disease  Aware     Open wound of left lower leg  Chronic issue for 2 years   Follow up outpatient wound care   Methadone dependence  Aware     Iron deficiency anemia  Anemia is likely due to Iron deficiency and chronic disease due to crohn's. Most recent hemoglobin and hematocrit are listed below.  Recent Labs     07/12/25  0430 07/12/25  0659 07/13/25  0348   HGB 7.0* 6.6* 7.3*   HCT 22.2* 21.7* 23.6*     Plan  - Monitor serial CBC: Daily  - Transfuse PRBC if patient becomes hemodynamically unstable, symptomatic or H/H drops below 7/21.  - Patient has received 1 units of PRBCs on 7/12/25  - Patient's anemia is currently stable  - check h/h n am  VTE Risk Mitigation (From admission, onward)           Ordered     enoxaparin injection 40 mg  Daily         07/11/25 2045     IP VTE HIGH RISK PATIENT  Once         07/11/25 1704     Place sequential  compression device  Until discontinued         07/11/25 1704                    Discharge Planning   JUNE: 7/14/2025     Code Status: Full Code   Medical Readiness for Discharge Date:   Discharge Plan A: Home with family                        Vianca Glover MD  Department of Hospital Medicine   [facility]

## 2025-07-14 NOTE — ASSESSMENT & PLAN NOTE
Antibiotics (From admission, onward)      Start     Stop Route Frequency Ordered    07/13/25 2100  amoxicillin-clavulanate 875-125mg per tablet 1 tablet         -- Oral Every 12 hours 07/13/25 1633            Microbiology Results (last 7 days)       Procedure Component Value Units Date/Time    Blood culture x two cultures. Draw prior to antibiotics [[id number]]  (Normal) Collected: 07/11/25 1254    Order Status: Completed Specimen: Blood from Peripheral, Forearm, Left Updated: 07/13/25 1401     CULTURE, BLOOD ([facility]) No Growth After 48 Hours    Blood culture x two cultures. Draw prior to antibiotics [[id number]]  (Normal) Collected: 07/11/25 1259    Order Status: Completed Specimen: Blood from Peripheral, Hand, Left Updated: 07/13/25 1401     CULTURE, BLOOD ([facility]) No Growth After 48 Hours    MRSA Screen by PCR [[id number]]     Order Status: Canceled Specimen: Nasal Swab     Influenza A & B by Molecular [[id number]]  (Normal) Collected: 07/11/25 1409    Order Status: Completed Specimen: Nasopharyngeal Swab Updated: 07/11/25 1435     INFLUENZA A MOLECULAR Negative     INFLUENZA B MOLECULAR  Negative    Blood culture x two cultures. Draw prior to antibiotics. [[id number]]     Order Status: Canceled Specimen: Blood     Blood culture x two cultures. Draw prior to antibiotics. [[id number]]     Order Status: Canceled Specimen: Blood           Deescalate as above  Home O2 eval

## 2025-07-14 NOTE — PLAN OF CARE
Temp last night discussed. Change in antibiotics discussed.       07/14/25 1044   Rounds   Attendance Provider;Nurse ;Assigned nurse   Discharge Plan A Home with family   Why the patient remains in the hospital Requires continued medical care

## 2025-07-14 NOTE — ASSESSMENT & PLAN NOTE
Patient has Abnormal Magnesium: hypomagnesemia. Will continue to monitor electrolytes closely. Will replace the affected electrolytes and repeat labs to be done after interventions completed. The patient's magnesium results have been reviewed and are listed below.  Recent Labs   Lab 07/14/25  0358   MG 1.6

## 2025-07-14 NOTE — SUBJECTIVE & OBJECTIVE
Interval History:  Patient seen and examined.  Patient has spiked fever of 101.3 overnight.  Remains afebrile this morning.  Plan of care discussed with the patient at the bedside in detail.    Review of Systems  Objective:     Vital Signs (Most Recent):  Temp: 98.9 °F (37.2 °C) (07/14/25 0715)  Pulse: 71 (07/14/25 0715)  Resp: 17 (07/14/25 0933)  BP: 134/66 (07/14/25 0715)  SpO2: 96 % (07/14/25 0715) Vital Signs (24h Range):  Temp:  [98.3 °F (36.8 °C)-101.3 °F (38.5 °C)] 98.9 °F (37.2 °C)  Pulse:  [70-80] 71  Resp:  [15-24] 17  SpO2:  [94 %-99 %] 96 %  BP: (117-135)/(59-79) 134/66     Weight: 93.8 kg (206 lb 12.7 oz)  Body mass index is 27.28 kg/m².    Intake/Output Summary (Last 24 hours) at 7/14/2025 1202  Last data filed at 7/14/2025 1047  Gross per 24 hour   Intake 1080 ml   Output 3225 ml   Net -2145 ml         Physical Exam  Vitals and nursing note reviewed.   Eyes:      Pupils: Pupils are equal, round, and reactive to light.   Neck:      Vascular: No JVD.   Cardiovascular:      Rate and Rhythm: Normal rate and regular rhythm.      Heart sounds: Normal heart sounds.   Pulmonary:      Effort: Pulmonary effort is normal.      Breath sounds: Normal breath sounds.   Abdominal:      General: Bowel sounds are normal. There is no distension.      Palpations: Abdomen is soft.      Tenderness: There is no abdominal tenderness.   Musculoskeletal:         General: No deformity.   Lymphadenopathy:      Cervical: No cervical adenopathy.   Skin:     Coloration: Skin is not pale.      Findings: No rash.               Significant Labs: All pertinent labs within the past 24 hours have been reviewed.    Significant Imaging: I have reviewed all pertinent imaging results/findings within the past 24 hours.

## 2025-07-15 PROBLEM — K74.60 CIRRHOSIS: Status: ACTIVE | Noted: 2025-07-15

## 2025-07-15 PROBLEM — D69.6 THROMBOCYTOPENIA: Status: RESOLVED | Noted: 2025-07-15 | Resolved: 2025-07-15

## 2025-07-15 PROBLEM — D69.6 THROMBOCYTOPENIA: Status: ACTIVE | Noted: 2025-07-15

## 2025-07-15 LAB
ALBUMIN SERPL-MCNC: 3.1 G/DL (ref 3.5–5.2)
ALP SERPL-CCNC: 88 UNIT/L (ref 55–135)
ALT SERPL-CCNC: 13 UNIT/L (ref 10–44)
ANION GAP (SMH): 7 MMOL/L (ref 8–16)
ANISOCYTOSIS BLD QL SMEAR: SLIGHT
AST SERPL-CCNC: 19 UNIT/L (ref 10–40)
BILIRUB SERPL-MCNC: 1.1 MG/DL (ref 0.1–1)
BUN SERPL-MCNC: 11 MG/DL (ref 8–23)
CALCIUM SERPL-MCNC: 8.1 MG/DL (ref 8.7–10.5)
CHLORIDE SERPL-SCNC: 97 MMOL/L (ref 95–110)
CO2 SERPL-SCNC: 28 MMOL/L (ref 23–29)
CREAT SERPL-MCNC: 0.8 MG/DL (ref 0.5–1.4)
DACRYOCYTES BLD QL SMEAR: ABNORMAL
EOSINOPHIL NFR BLD MANUAL: 4 % (ref 0–8)
ERYTHROCYTE [DISTWIDTH] IN BLOOD BY AUTOMATED COUNT: 17.9 % (ref 11.5–14.5)
GFR SERPLBLD CREATININE-BSD FMLA CKD-EPI: >60 ML/MIN/1.73/M2
GLUCOSE SERPL-MCNC: 97 MG/DL (ref 70–110)
HCT VFR BLD AUTO: 28 % (ref 40–54)
HGB BLD-MCNC: 9 GM/DL (ref 14–18)
LACTATE SERPL-SCNC: 1.1 MMOL/L (ref 0.5–1.9)
LYMPHOCYTES NFR BLD MANUAL: 12 % (ref 18–48)
MAGNESIUM SERPL-MCNC: 1.7 MG/DL (ref 1.6–2.6)
MCH RBC QN AUTO: 24.5 PG (ref 27–31)
MCHC RBC AUTO-ENTMCNC: 32.1 G/DL (ref 32–36)
MCV RBC AUTO: 76 FL (ref 82–98)
MONOCYTES NFR BLD MANUAL: 4 % (ref 4–15)
NEUTROPHILS NFR BLD MANUAL: 80 % (ref 38–73)
NUCLEATED RBC (/100WBC) (SMH): 0 /100 WBC
OVALOCYTES BLD QL SMEAR: ABNORMAL
PLATELET # BLD AUTO: 105 K/UL (ref 150–450)
PLATELET BLD QL SMEAR: ABNORMAL
PMV BLD AUTO: ABNORMAL FL
POTASSIUM SERPL-SCNC: 4.6 MMOL/L (ref 3.5–5.1)
PROT SERPL-MCNC: 6.6 GM/DL (ref 6–8.4)
RBC # BLD AUTO: 3.68 M/UL (ref 4.6–6.2)
SODIUM SERPL-SCNC: 132 MMOL/L (ref 136–145)
WBC # BLD AUTO: 1.76 K/UL (ref 3.9–12.7)

## 2025-07-15 PROCEDURE — 63600175 PHARM REV CODE 636 W HCPCS: Performed by: INTERNAL MEDICINE

## 2025-07-15 PROCEDURE — 25000003 PHARM REV CODE 250: Performed by: INTERNAL MEDICINE

## 2025-07-15 PROCEDURE — 21400001 HC TELEMETRY ROOM

## 2025-07-15 PROCEDURE — 97165 OT EVAL LOW COMPLEX 30 MIN: CPT

## 2025-07-15 PROCEDURE — 83605 ASSAY OF LACTIC ACID: CPT | Performed by: HOSPITALIST

## 2025-07-15 PROCEDURE — 84460 ALANINE AMINO (ALT) (SGPT): CPT | Performed by: INTERNAL MEDICINE

## 2025-07-15 PROCEDURE — 85007 BL SMEAR W/DIFF WBC COUNT: CPT | Performed by: INTERNAL MEDICINE

## 2025-07-15 PROCEDURE — 36415 COLL VENOUS BLD VENIPUNCTURE: CPT | Performed by: INTERNAL MEDICINE

## 2025-07-15 PROCEDURE — 99223 1ST HOSP IP/OBS HIGH 75: CPT | Mod: ,,, | Performed by: INTERNAL MEDICINE

## 2025-07-15 PROCEDURE — 36415 COLL VENOUS BLD VENIPUNCTURE: CPT | Performed by: HOSPITALIST

## 2025-07-15 PROCEDURE — 87040 BLOOD CULTURE FOR BACTERIA: CPT | Performed by: HOSPITALIST

## 2025-07-15 PROCEDURE — 11000001 HC ACUTE MED/SURG PRIVATE ROOM

## 2025-07-15 PROCEDURE — 25000003 PHARM REV CODE 250: Performed by: HOSPITALIST

## 2025-07-15 PROCEDURE — 63600175 PHARM REV CODE 636 W HCPCS: Performed by: HOSPITALIST

## 2025-07-15 PROCEDURE — 97116 GAIT TRAINING THERAPY: CPT | Mod: CQ

## 2025-07-15 PROCEDURE — 83735 ASSAY OF MAGNESIUM: CPT | Performed by: INTERNAL MEDICINE

## 2025-07-15 PROCEDURE — 25000003 PHARM REV CODE 250

## 2025-07-15 PROCEDURE — 20600001 HC STEP DOWN PRIVATE ROOM

## 2025-07-15 RX ORDER — DOXYCYCLINE 100 MG/1
100 CAPSULE ORAL 2 TIMES DAILY
Status: DISCONTINUED | OUTPATIENT
Start: 2025-07-15 | End: 2025-07-19 | Stop reason: HOSPADM

## 2025-07-15 RX ORDER — MEROPENEM 1 G/1
1 INJECTION, POWDER, FOR SOLUTION INTRAVENOUS
Status: DISCONTINUED | OUTPATIENT
Start: 2025-07-15 | End: 2025-07-17

## 2025-07-15 RX ADMIN — VANCOMYCIN HYDROCHLORIDE 2000 MG: 500 INJECTION, POWDER, LYOPHILIZED, FOR SOLUTION INTRAVENOUS at 09:07

## 2025-07-15 RX ADMIN — PANTOPRAZOLE SODIUM 40 MG: 40 TABLET, DELAYED RELEASE ORAL at 06:07

## 2025-07-15 RX ADMIN — Medication: at 09:07

## 2025-07-15 RX ADMIN — ENOXAPARIN SODIUM 40 MG: 40 INJECTION SUBCUTANEOUS at 06:07

## 2025-07-15 RX ADMIN — DOXYCYCLINE 100 MG: 100 CAPSULE ORAL at 08:07

## 2025-07-15 RX ADMIN — METHADONE HYDROCHLORIDE 110 MG: 10 TABLET ORAL at 08:07

## 2025-07-15 RX ADMIN — SENNOSIDES AND DOCUSATE SODIUM 1 TABLET: 50; 8.6 TABLET ORAL at 08:07

## 2025-07-15 RX ADMIN — MEROPENEM 1 G: 1 INJECTION INTRAVENOUS at 09:07

## 2025-07-15 RX ADMIN — PIPERACILLIN SODIUM AND TAZOBACTAM SODIUM 4.5 G: 4; .5 INJECTION, POWDER, LYOPHILIZED, FOR SOLUTION INTRAVENOUS at 01:07

## 2025-07-15 RX ADMIN — ACETAMINOPHEN 650 MG: 325 TABLET ORAL at 03:07

## 2025-07-15 RX ADMIN — Medication 6 MG: at 08:07

## 2025-07-15 RX ADMIN — VANCOMYCIN HYDROCHLORIDE 1500 MG: 1.5 INJECTION, POWDER, LYOPHILIZED, FOR SOLUTION INTRAVENOUS at 08:07

## 2025-07-15 RX ADMIN — MEROPENEM 1 G: 1 INJECTION INTRAVENOUS at 06:07

## 2025-07-15 RX ADMIN — Medication 800 MG: at 06:07

## 2025-07-15 RX ADMIN — POLYETHYLENE GLYCOL 3350 17 G: 17 POWDER, FOR SOLUTION ORAL at 08:07

## 2025-07-15 RX ADMIN — ACETAMINOPHEN 650 MG: 325 TABLET ORAL at 04:07

## 2025-07-15 NOTE — PT/OT/SLP PROGRESS
Physical Therapy Treatment    Patient Name:  Stevie Cristina   MRN:  [id number]    Recommendations:     Discharge Recommendations: Low Intensity Therapy (Home Health PT)  Discharge Equipment Recommendations: cane, straight  Barriers to discharge: None    Assessment:     Stevie Cristina is a 70 y.o. male admitted with a medical diagnosis of Acute pneumonia.  He presents with the following impairments/functional limitations: impaired endurance, weakness, impaired functional mobility, gait instability, decreased safety awareness, impaired cardiopulmonary response to activity .     Rehab Prognosis: Fair; patient would benefit from acute skilled PT services to address these deficits and reach maximum level of function.    Recent Surgery: * No surgery found *      Plan:     During this hospitalization, patient to be seen 5 x/week to address the identified rehab impairments via gait training, therapeutic activities, therapeutic exercises and progress toward the following goals:    Plan of Care Expires:  08/14/25    Subjective     Chief Complaint: I'm cold.  Patient/Family Comments/goals: To get warm  Pain/Comfort:  Pain Rating 1: 0/10      Objective:     Communicated with RN prior to session.  Patient found HOB elevated with bed alarm, telemetry, peripheral IV upon PT entry to room.     General Precautions: Standard, fall  Orthopedic Precautions: N/A  Braces: N/A  Respiratory Status: Room air     Functional Mobility:  Bed Mobility:     Supine to Sit: contact guard assistance  Transfers:     Sit to Stand:  contact guard assistance with rolling walker  Gait: 210 feet CGA/Min a with rw      AM-PAC 6 CLICK MOBILITY          Treatment & Education:  Pt was educated on the following: call light use, importance of OOB activity and functional mobility to negate the negative effects of prolonged bed rest during this hospitalization, safe transfers/ambulation and discharge planning recommendations/options.     Patient left  up in chair with all lines intact, call button in reach, chair alarm on, and RN notified..    GOALS:   Multidisciplinary Problems       Physical Therapy Goals          Problem: Physical Therapy    Goal Priority Disciplines Outcome Interventions   Physical Therapy Goal     PT, PT/OT     Description: Goals to be met by: 25     Patient will increase functional independence with mobility by performin. Sit to stand transfer with Modified [place]  2. Gait  x 200 feet with Modified [place] using Single-point Cane.   3. Ascend/descend 3 stair with right Handrails Stand-by Assistance using Single-point Cane .                              Time Tracking:     PT Received On: 07/15/25  PT Start Time: 1343     PT Stop Time: 1406  PT Total Time (min): 23 min     Billable Minutes: Gait Training 23    Treatment Type: Treatment  PT/PTA: PTA     Number of PTA visits since last PT visit: 1     07/15/2025

## 2025-07-15 NOTE — PROGRESS NOTES
"Pharmacokinetic Initial Assessment: IV Vancomycin    Assessment/Plan:    Initiate intravenous vancomycin with loading dose of 2000 mg once followed by a maintenance dose of vancomycin 1500 mg IV every 12 hours  Desired empiric serum trough concentration is 15 to 20 mcg/mL  Draw vancomycin trough level 60 min prior to third dose on 7/16 at approximately 0900  Pharmacy will continue to follow and monitor vancomycin.      Please contact pharmacy at extension [phone] with any questions regarding this assessment.     Thank you for the consult,   Yasemin Soliz       Patient brief summary:  Stevie Cristina is a 70 y.o. male initiated on antimicrobial therapy with IV Vancomycin for treatment of suspected bacteremia    Drug Allergies:   Review of patient's allergies indicates:  No Known Allergies    Actual Body Weight:   93.8 kg    Renal Function:   Estimated Creatinine Clearance: 94.3 mL/min (based on SCr of 0.8 mg/dL).,     Dialysis Method (if applicable):  N/A    CBC (last 72 hours):  Recent Labs   Lab Result Units 07/13/25  0348 07/14/25  0358 07/15/25  0430   WBC K/uL 2.33* 2.51* 1.76*   Hgb gm/dL 7.3* 7.9* 9.0*   Hct % 23.6* 25.7* 28.0*   Platelet Count K/uL 81* 102* 105*   Mono % % 0.9* 6.4  --    Monocyte % %  --   --  4.0   Eos % % 2.1 3.2  --    Eosinophil % %  --   --  4.0   Basophil % % 0.4 0.4  --        Metabolic Panel (last 72 hours):  Recent Labs   Lab Result Units 07/13/25  0348 07/14/25  0358 07/15/25  0430   Sodium mmol/L 135* 132* 132*   Potassium mmol/L 3.8 4.4 4.6   Chloride mmol/L 103 99 97   CO2 mmol/L 28 27 28   Glucose mg/dL 90 76 97   BUN mg/dL 13 14 11   Creatinine mg/dL 0.7 0.6 0.8   Albumin g/dL 2.6* 2.8* 3.1*   Bilirubin Total mg/dL 1.4* 1.1* 1.1*   ALP unit/L 63 67 88   AST unit/L 22 17 19   ALT unit/L 14 13 13   Magnesium mg/dL 1.5* 1.6 1.7       Drug levels (last 3 results):  No results for input(s): "VANCOMYCINRA", "VANCORANDOM", "VANCOMYCINPE", "VANCOPEAK", "VANCOMYCINTR", " ""VANCOTROUGH" in the last 72 hours.    Microbiologic Results:  Microbiology Results (last 7 days)       Procedure Component Value Units Date/Time    Blood culture [[id number]]  (Normal) Collected: 07/14/25 0847    Order Status: Completed Specimen: Blood from Peripheral, Antecubital, Right Updated: 07/14/25 1602     CULTURE, BLOOD ([facility]) No Growth After 6 Hours    Blood culture x two cultures. Draw prior to antibiotics [[id number]]  (Normal) Collected: 07/11/25 1254    Order Status: Completed Specimen: Blood from Peripheral, Forearm, Left Updated: 07/14/25 1402     CULTURE, BLOOD ([facility]) No Growth After 72 Hours    Blood culture x two cultures. Draw prior to antibiotics [[id number]]  (Normal) Collected: 07/11/25 1259    Order Status: Completed Specimen: Blood from Peripheral, Hand, Left Updated: 07/14/25 1402     CULTURE, BLOOD ([facility]) No Growth After 72 Hours    MRSA Screen by PCR [[id number]]     Order Status: Canceled Specimen: Nasal Swab     Influenza A & B by Molecular [[id number]]  (Normal) Collected: 07/11/25 1409    Order Status: Completed Specimen: Nasopharyngeal Swab Updated: 07/11/25 1435     INFLUENZA A MOLECULAR Negative     INFLUENZA B MOLECULAR  Negative    Blood culture x two cultures. Draw prior to antibiotics. [[id number]]     Order Status: Canceled Specimen: Blood     Blood culture x two cultures. Draw prior to antibiotics. [[id number]]     Order Status: Canceled Specimen: Blood             "

## 2025-07-15 NOTE — ASSESSMENT & PLAN NOTE
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 Co-morbidities are present and inclusive of ascites, malnutrition, and anemia/pancytopenia.  MELD-Na score calculated; MELD 3.0: 8 at 1/19/2025  6:14 AM  MELD-Na: 7 at 1/19/2025  6:14 AM  Calculated from:  Serum Creatinine: 0.7 mg/dL (Using min of 1 mg/dL) at 1/19/2025  6:14 AM  Serum Sodium: 137 mmol/L at 1/19/2025  6:14 AM  Total Bilirubin: 0.9 mg/dL (Using min of 1 mg/dL) at 1/19/2025  6:14 AM  Serum Albumin: 2.8 g/dL at 1/19/2025  6:14 AM  INR(ratio): 1.1 at 1/17/2025  4:20 AM  Age at listing (hypothetical): 69 years  Sex: Male at 1/19/2025  6:14 AM      Continue chronic meds. Etiology likely NAFLD. Will avoid any hepatotoxic meds, and monitor CBC/CMP/INR for synthetic function.

## 2025-07-15 NOTE — PLAN OF CARE
Problem: Physical Therapy  Goal: Physical Therapy Goal  Description: Goals to be met by: 25     Patient will increase functional independence with mobility by performin. Sit to stand transfer with Modified [place]  2. Gait  x 200 feet with Modified [place] using Single-point Cane.   3. Ascend/descend 3 stair with right Handrails Stand-by Assistance using Single-point Cane .     7/15/2025 1456 by Macario Segura PTA  Outcome: Ongoing

## 2025-07-15 NOTE — NURSING
Patient woke and got out of bed, very confused stating he was trying to get in his bed. After talking to him and getting him into dry clothes and bedding he is still getting out of bed to go to his room and needing to be redirected.

## 2025-07-15 NOTE — ASSESSMENT & PLAN NOTE
Anemia is likely due to Iron deficiency and chronic disease due to crohn's. Most recent hemoglobin and hematocrit are listed below.  Recent Labs     07/13/25  0348 07/14/25  0358 07/15/25  0430   HGB 7.3* 7.9* 9.0*   HCT 23.6* 25.7* 28.0*     Plan  - Monitor serial CBC: Daily  - Transfuse PRBC if patient becomes hemodynamically unstable, symptomatic or H/H drops below 7/21.  - Patient has received 1 units of PRBCs on 7/12/25  - Patient's anemia is currently stable   negative - no vomiting, no diarrhea

## 2025-07-15 NOTE — CARE UPDATE
07/14/25 2015   Patient Assessment/Suction   Level of Consciousness (AVPU) alert   Respiratory Effort Normal   Expansion/Accessory Muscles/Retractions no use of accessory muscles   Rhythm/Pattern, Respiratory pattern regular;no shortness of breath reported   PRE-TX-O2   Device (Oxygen Therapy) room air   Pulse Oximetry Type Continuous   $ Pulse Oximetry - Multiple Charge Pulse Oximetry - Multiple   Positioning HOB elevated 30 degrees   Positioning   Positioning/Transfer Devices pillows;in use   Education   $ Education Other (see comment);15 min   Respiratory Evaluation   $ Care Plan Tech Time 15 min

## 2025-07-15 NOTE — ASSESSMENT & PLAN NOTE
Noted, chronic Crohn disease.  No evidence of acute exacerbation currently.  Continue to monitor.

## 2025-07-15 NOTE — ASSESSMENT & PLAN NOTE
"Patient has a diagnosis of pneumonia. The cause of the pneumonia is suspected to be bacterial in etiology but organism is not known. The pneumonia is worsening due to fever and hypoxia. The patient has the following signs/symptoms of pneumonia: persistent hypoxia , cough, and shortness of breath. The patient does have a current oxygen requirement and the patient does not have a home oxygen requirement. I have reviewed the pertinent imaging. The following cultures have been collected: Blood cultures and Sputum culture The culture results are listed below.     Current antimicrobial regimen consists of the antibiotics listed below. Will monitor patient closely and Adjust treatment plan as follows escalate antibiotics to include linezolid and Zosyn    Antibiotics (From admission, onward)      Start     Stop Route Frequency Ordered    07/15/25 2200  vancomycin 1,500 mg in 0.9% NaCl 250 mL IVPB (admixture device)         -- IV Every 12 hours (non-standard times) 07/15/25 0900    07/15/25 0945  meropenem injection 1 g         -- IV Every 8 hours (non-standard times) 07/15/25 0843    07/15/25 0943  vancomycin - pharmacy to dose  (vancomycin IVPB (PEDS and ADULTS))        Placed in "And" Linked Group    -- IV pharmacy to manage frequency 07/15/25 0843          Escalated antibiotics on 07/15 secondary to clinical deterioration and worsened spiking fevers.  Chest x-ray showed worsening pneumonia.    "

## 2025-07-15 NOTE — PT/OT/SLP EVAL
"Occupational Therapy   Evaluation    Name: Stevie Cristina  MRN: [id number]  Admitting Diagnosis: Acute pneumonia  Recent Surgery: * No surgery found *      Recommendations:     Discharge Recommendations: Low Intensity Therapy  Discharge Equipment Recommendations:  none  Barriers to discharge:  None    Assessment:     Stevie Cristina is a 70 y.o. male with a medical diagnosis of Acute pneumonia.  Performance deficits affecting function: weakness, impaired endurance, impaired self care skills, impaired functional mobility, gait instability, impaired balance, decreased safety awareness, impaired cardiopulmonary response to activity.      Limited evaluation; pt reported feeling tired and cold after a long night last night.  He declined out of bed activity at time of OT evaluation.      Rehab Prognosis: Fair; patient would benefit from acute skilled OT services to address these deficits and reach maximum level of function.       Plan:     Patient to be seen 5 x/week to address the above listed problems via self-care/home management, therapeutic activities, therapeutic exercises  Plan of Care Expires: 08/14/25  Plan of Care Reviewed with: patient    Subjective     Chief Complaint: "I'm cold."  Patient/Family Comments/goals: go home    Occupational Profile:  Living Environment: Lives with his wife and adult children in a [facility] with [employer]  Previous level of function: Independent, driving, walking several miles a day  Roles and Routines: , father, grandfather  Equipment Used at Home: none  Assistance upon Discharge: family    Pain/Comfort:  Pain Rating 1: 0/10  Pain Rating Post-Intervention 1: 0/10      Objective:     Communicated with: nurse prior to session.  Patient found supine with telemetry upon OT entry to room.    General Precautions: Standard, fall  Respiratory Status: Room air    Occupational Performance:    Bed Mobility:    Patient completed Scooting/Bridging with modified independence and with side " rail    Activities of Daily Living:  Feeding:  independence    Toileting: set-up assistance for urinal use at bed level       Cognitive/Visual Perceptual:  AAOx4  Lethargic    Physical Exam:  Upper Extremity Range of Motion:     -       Right Upper Extremity: WFL  -       Left Upper Extremity: WFL  Upper Extremity Strength:    -       Right Upper Extremity: WFL  -       Left Upper Extremity: WFL   Strength:    -       Right Upper Extremity: WFL  -       Left Upper Extremity: WFL  Fine Motor Coordination:    -       Intact     AMPAC 6 Click ADL:  AMPAC Total Score: 21    Treatment & Education:  Therapist provided facilitation and instruction of proper body mechanics and fall prevention strategies during tasks listed above.   Instructed patient to sit in bedside chair as tolerated daily to increase OOB/activity tolerance.   Instructed patient to use call light to have nursing staff assist with needs/transfers.   Discussed OT POC and answered all questions within OT scope of practice.    Patient left HOB elevated with all lines intact, call button in reach, and bed alarm on    GOALS:   Multidisciplinary Problems       Occupational Therapy Goals          Problem: Occupational Therapy    Goal Priority Disciplines Outcome Interventions   Occupational Therapy Goal     OT, PT/OT     Description: Goals to be met by: 8/15/25     Patient will increase functional independence with ADLs by performing:    UE Dressing with Supervision.  LE Dressing with Supervision.  Grooming while standing at sink with Supervision.  Toileting from toilet with Supervision for hygiene and clothing management.   Toilet transfer to toilet with Supervision.                         DME Justifications:  No DME recommended requiring DME justifications    History:     Past Medical History:   Diagnosis Date    Colon polyp     Crohn disease     Hard of hearing     Hepatitis C     Hepatitis C     Hypertension     TB lung, latent          Past Surgical  History:   Procedure Laterality Date    ABDOMINAL SURGERY      BOWEL RESECTION    APPENDECTOMY      CHOLECYSTECTOMY         Time Tracking:     OT Date of Treatment: 07/15/25  OT Start Time: 1030  OT Stop Time: 1038  OT Total Time (min): 8 min    Billable Minutes:Evaluation 08    7/15/2025

## 2025-07-15 NOTE — PROGRESS NOTES
Critical access hospital Medicine  Progress Note    Patient Name: Stevie Cristina  MRN: [id number]  Patient Class: IP- Inpatient   Admission Date: 7/11/2025  Length of Stay: 3 days  Attending Physician: Sergey Gallardo MD  Primary Care Provider: Temitope, Primary Doctor        Subjective     Principal Problem:Acute pneumonia        HPI:  70 year old pt getting admitted with severe sepsis/pneumonia  Pt overall is a poor historian and was not confused when saw in ER  He said he started having Nausea/Vomiting/diarrhea to ER physician but denied this c/o to me  He said he only had fever/chills/cough  Imaging showed evidence of pneumonia   His troponin levels were elevated but denied chest pain  Pt will be admitted under observation status as per  instruction     Overview/Hospital Course:  70-year-old gentleman presents to the emergency department with complaints of fever, chills and dyspnea.  Imaging consistent with pneumonia patient is started on broad-spectrum antibiotics.  Patient noted to be in severe sepsis on presentation which quickly resolved after 1 day of antibiotics.  Patient quickly defervesced.Found to have drop in H/H with history of crohn's disease. He received 1 unti of PRBCs. No evidence of active bleeding. Still having low grade temps.     Interval History:  Patient seen and examined.  Patient continues to spike fevers.  Overnight, fever was 103.  Patient appears to be neutropenic.  Lactic acid drawn this morning is negative     Review of Systems  Objective:     Vital Signs (Most Recent):  Temp: 99.9 °F (37.7 °C) (07/15/25 1115)  Pulse: 86 (07/15/25 1100)  Resp: 20 (07/15/25 1100)  BP: 134/61 (07/15/25 1100)  SpO2: (!) 94 % (07/15/25 1100) Vital Signs (24h Range):  Temp:  [98.2 °F (36.8 °C)-103.1 °F (39.5 °C)] 99.9 °F (37.7 °C)  Pulse:  [] 86  Resp:  [16-26] 20  SpO2:  [93 %-98 %] 94 %  BP: (123-157)/(60-72) 134/61     Weight: 93.8 kg (206 lb 12.7 oz)  Body mass index is 27.28  kg/m².    Intake/Output Summary (Last 24 hours) at 7/15/2025 1220  Last data filed at 7/15/2025 1136  Gross per 24 hour   Intake 2523.36 ml   Output 1525 ml   Net 998.36 ml         Physical Exam  Vitals and nursing note reviewed.   Eyes:      Pupils: Pupils are equal, round, and reactive to light.   Neck:      Vascular: No JVD.   Cardiovascular:      Rate and Rhythm: Normal rate and regular rhythm.      Heart sounds: Normal heart sounds.   Pulmonary:      Effort: Pulmonary effort is normal.      Breath sounds: Normal breath sounds.   Abdominal:      General: Bowel sounds are normal. There is distension.      Palpations: Abdomen is soft.      Tenderness: There is no abdominal tenderness. There is no guarding or rebound.   Musculoskeletal:         General: No deformity.   Lymphadenopathy:      Cervical: No cervical adenopathy.   Skin:     Coloration: Skin is not pale.      Findings: No rash.   Neurological:      Mental Status: He is oriented to person, place, and time. Mental status is at baseline.               Significant Labs: All pertinent labs within the past 24 hours have been reviewed.    Significant Imaging: I have reviewed all pertinent imaging results/findings within the past 24 hours.      Assessment & Plan  Acute pneumonia  Patient has a diagnosis of pneumonia. The cause of the pneumonia is suspected to be bacterial in etiology but organism is not known. The pneumonia is worsening due to fever and hypoxia. The patient has the following signs/symptoms of pneumonia: persistent hypoxia , cough, and shortness of breath. The patient does have a current oxygen requirement and the patient does not have a home oxygen requirement. I have reviewed the pertinent imaging. The following cultures have been collected: Blood cultures and Sputum culture The culture results are listed below.     Current antimicrobial regimen consists of the antibiotics listed below. Will monitor patient closely and Adjust treatment plan as  "follows escalate antibiotics to include linezolid and Zosyn    Antibiotics (From admission, onward)      Start     Stop Route Frequency Ordered    07/15/25 2200  vancomycin 1,500 mg in 0.9% NaCl 250 mL IVPB (admixture device)         -- IV Every 12 hours (non-standard times) 07/15/25 0900    07/15/25 0945  meropenem injection 1 g         -- IV Every 8 hours (non-standard times) 07/15/25 0843    07/15/25 0943  vancomycin - pharmacy to dose  (vancomycin IVPB (PEDS and ADULTS))        Placed in "And" Linked Group    -- IV pharmacy to manage frequency 07/15/25 0843          Escalated antibiotics on 07/15 secondary to clinical deterioration and worsened spiking fevers.  Chest x-ray showed worsening pneumonia.    Crohn's disease  Noted, chronic Crohn disease.  No evidence of acute exacerbation currently.  Continue to monitor.    Open wound of left lower leg  Chronic issue for 2 years   Follow up outpatient wound care     Methadone dependence  Aware     Iron deficiency anemia  Anemia is likely due to Iron deficiency and chronic disease due to crohn's. Most recent hemoglobin and hematocrit are listed below.  Recent Labs     07/13/25  0348 07/14/25  0358 07/15/25  0430   HGB 7.3* 7.9* 9.0*   HCT 23.6* 25.7* 28.0*     Plan  - Monitor serial CBC: Daily  - Transfuse PRBC if patient becomes hemodynamically unstable, symptomatic or H/H drops below 7/21.  - Patient has received 1 units of PRBCs on 7/12/25  - Patient's anemia is currently stable  Hypomagnesemia  Patient has Abnormal Magnesium: hypomagnesemia. Will continue to monitor electrolytes closely. Will replace the affected electrolytes and repeat labs to be done after interventions completed. The patient's magnesium results have been reviewed and are listed below.  Recent Labs   Lab 07/15/25  0430   MG 1.7      Hyponatremia  Hyponatremia is likely due to SIADH secondary to pneumonia. The patient's most recent sodium results are listed below.  Recent Labs     07/13/25  0348 " 07/14/25  0358 07/15/25  0430   * 132* 132*     Plan  - Correct the sodium by 4-6mEq in 24 hours.   - Monitor sodium Daily.   - Patient hyponatremia is worsening. Will continue current treatment  Pancytopenia  This patient is found to have pancytopenia, the likely etiology is Drug induced (including chemotherapy) related to medication for his Crohn's disease, will monitor CBC Daily. Will transfuse red blood cells if the hemoglobin is <7g/dL (or <8 in the setting of ACS). Will transfuse platelets if platelet count is <10k. Hold DVT prophylaxis if platelets are <50k. The patient's hemoglobin, white blood cell count, and platelet count results have been reviewed and are listed below.  Recent Labs   Lab 07/15/25  0430   HGB 9.0*   WBC 1.76*   *       Cirrhosis  Patient with known Cirrhosis with Child's class C. Co-morbidities are present and inclusive of ascites, malnutrition, and anemia/pancytopenia.  MELD-Na score calculated; MELD 3.0: 8 at 1/19/2025  6:14 AM  MELD-Na: 7 at 1/19/2025  6:14 AM  Calculated from:  Serum Creatinine: 0.7 mg/dL (Using min of 1 mg/dL) at 1/19/2025  6:14 AM  Serum Sodium: 137 mmol/L at 1/19/2025  6:14 AM  Total Bilirubin: 0.9 mg/dL (Using min of 1 mg/dL) at 1/19/2025  6:14 AM  Serum Albumin: 2.8 g/dL at 1/19/2025  6:14 AM  INR(ratio): 1.1 at 1/17/2025  4:20 AM  Age at listing (hypothetical): 69 years  Sex: Male at 1/19/2025  6:14 AM      Continue chronic meds. Etiology likely NAFLD. Will avoid any hepatotoxic meds, and monitor CBC/CMP/INR for synthetic function.     VTE Risk Mitigation (From admission, onward)           Ordered     enoxaparin injection 40 mg  Daily         07/11/25 2045     IP VTE HIGH RISK PATIENT  Once         07/11/25 1704     Place sequential compression device  Until discontinued         07/11/25 1704                    Discharge Planning   JUNE: 7/17/2025     Code Status: Full Code   Medical Readiness for Discharge Date:   Discharge Plan A: Home with family               Sergey Gallardo MD  Department of Hospital Medicine   [facility]

## 2025-07-15 NOTE — SUBJECTIVE & OBJECTIVE
Interval History:  Patient seen and examined.  Patient continues to spike fevers.  Overnight, fever was 103.  Patient appears to be neutropenic.  Lactic acid drawn this morning is negative     Review of Systems  Objective:     Vital Signs (Most Recent):  Temp: 99.9 °F (37.7 °C) (07/15/25 1115)  Pulse: 86 (07/15/25 1100)  Resp: 20 (07/15/25 1100)  BP: 134/61 (07/15/25 1100)  SpO2: (!) 94 % (07/15/25 1100) Vital Signs (24h Range):  Temp:  [98.2 °F (36.8 °C)-103.1 °F (39.5 °C)] 99.9 °F (37.7 °C)  Pulse:  [] 86  Resp:  [16-26] 20  SpO2:  [93 %-98 %] 94 %  BP: (123-157)/(60-72) 134/61     Weight: 93.8 kg (206 lb 12.7 oz)  Body mass index is 27.28 kg/m².    Intake/Output Summary (Last 24 hours) at 7/15/2025 1220  Last data filed at 7/15/2025 1136  Gross per 24 hour   Intake 2523.36 ml   Output 1525 ml   Net 998.36 ml         Physical Exam  Vitals and nursing note reviewed.   Eyes:      Pupils: Pupils are equal, round, and reactive to light.   Neck:      Vascular: No JVD.   Cardiovascular:      Rate and Rhythm: Normal rate and regular rhythm.      Heart sounds: Normal heart sounds.   Pulmonary:      Effort: Pulmonary effort is normal.      Breath sounds: Normal breath sounds.   Abdominal:      General: Bowel sounds are normal. There is distension.      Palpations: Abdomen is soft.      Tenderness: There is no abdominal tenderness. There is no guarding or rebound.   Musculoskeletal:         General: No deformity.   Lymphadenopathy:      Cervical: No cervical adenopathy.   Skin:     Coloration: Skin is not pale.      Findings: No rash.   Neurological:      Mental Status: He is oriented to person, place, and time. Mental status is at baseline.               Significant Labs: All pertinent labs within the past 24 hours have been reviewed.    Significant Imaging: I have reviewed all pertinent imaging results/findings within the past 24 hours.

## 2025-07-15 NOTE — ASSESSMENT & PLAN NOTE
This patient is found to have pancytopenia, the likely etiology is Drug induced (including chemotherapy) related to medication for his Crohn's disease, will monitor CBC Daily. Will transfuse red blood cells if the hemoglobin is <7g/dL (or <8 in the setting of ACS). Will transfuse platelets if platelet count is <10k. Hold DVT prophylaxis if platelets are <50k. The patient's hemoglobin, white blood cell count, and platelet count results have been reviewed and are listed below.  Recent Labs   Lab 07/15/25  0430   HGB 9.0*   WBC 1.76*   *

## 2025-07-15 NOTE — PLAN OF CARE
Elevated temp discussed    Antibiotics changed    CXR results discussed       07/15/25 1020   Rounds   Attendance Provider;Nurse ;Assigned nurse   Discharge Plan A Home with family   Transition of Care Barriers None

## 2025-07-15 NOTE — ASSESSMENT & PLAN NOTE
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 The patient's most recent sodium results are listed below.  Recent Labs     07/13/25  0348 07/14/25  0358 07/15/25  0430   * 132* 132*     Plan  - Correct the sodium by 4-6mEq in 24 hours.   - Monitor sodium Daily.   - Patient hyponatremia is worsening. Will continue current treatment

## 2025-07-15 NOTE — PLAN OF CARE
Pt spiked a temp last night. Antibiotics changed to Meropenem and Vanc.     CXR shows mild pulmonary edema    Pt will need Home Health on discharge due to therapy eval of LIT       07/15/25 2058   Discharge Reassessment   Assessment Type Discharge Planning Reassessment   Did the patient's condition or plan change since previous assessment? No   Discharge Plan A Home Health

## 2025-07-15 NOTE — ASSESSMENT & PLAN NOTE
Patient has Abnormal Magnesium: hypomagnesemia. Will continue to monitor electrolytes closely. Will replace the affected electrolytes and repeat labs to be done after interventions completed. The patient's magnesium results have been reviewed and are listed below.  Recent Labs   Lab 07/15/25  0430   MG 1.7

## 2025-07-16 LAB
ABSOLUTE EOSINOPHIL (SMH): 0.03 K/UL
ABSOLUTE MONOCYTE (SMH): 0.12 K/UL (ref 0.3–1)
ABSOLUTE NEUTROPHIL COUNT (SMH): 0.6 K/UL (ref 1.8–7.7)
ALBUMIN SERPL-MCNC: 2.9 G/DL (ref 3.5–5.2)
ALP SERPL-CCNC: 77 UNIT/L (ref 55–135)
ALT SERPL-CCNC: 12 UNIT/L (ref 10–44)
ANION GAP (SMH): 4 MMOL/L (ref 8–16)
AST SERPL-CCNC: 18 UNIT/L (ref 10–40)
BACTERIA BLD CULT: NORMAL
BACTERIA BLD CULT: NORMAL
BASOPHILS # BLD AUTO: 0.01 K/UL
BASOPHILS NFR BLD AUTO: 1.1 %
BILIRUB SERPL-MCNC: 1 MG/DL (ref 0.1–1)
BUN SERPL-MCNC: 10 MG/DL (ref 8–23)
CALCIUM SERPL-MCNC: 8 MG/DL (ref 8.7–10.5)
CHLORIDE SERPL-SCNC: 98 MMOL/L (ref 95–110)
CO2 SERPL-SCNC: 29 MMOL/L (ref 23–29)
CREAT SERPL-MCNC: 0.7 MG/DL (ref 0.5–1.4)
ERYTHROCYTE [DISTWIDTH] IN BLOOD BY AUTOMATED COUNT: 18.4 % (ref 11.5–14.5)
GFR SERPLBLD CREATININE-BSD FMLA CKD-EPI: >60 ML/MIN/1.73/M2
GLUCOSE SERPL-MCNC: 72 MG/DL (ref 70–110)
HCT VFR BLD AUTO: 25.1 % (ref 40–54)
HGB BLD-MCNC: 7.8 GM/DL (ref 14–18)
IMM GRANULOCYTES # BLD AUTO: 0.01 K/UL (ref 0–0.04)
IMM GRANULOCYTES NFR BLD AUTO: 1.1 % (ref 0–0.5)
LACTATE SERPL-SCNC: 0.9 MMOL/L (ref 0.5–1.9)
LYMPHOCYTES # BLD AUTO: 0.15 K/UL (ref 1–4.8)
MAGNESIUM SERPL-MCNC: 1.8 MG/DL (ref 1.6–2.6)
MCH RBC QN AUTO: 23.7 PG (ref 27–31)
MCHC RBC AUTO-ENTMCNC: 31.1 G/DL (ref 32–36)
MCV RBC AUTO: 76 FL (ref 82–98)
NUCLEATED RBC (/100WBC) (SMH): 0 /100 WBC
PLATELET # BLD AUTO: 116 K/UL (ref 150–450)
PMV BLD AUTO: ABNORMAL FL
POTASSIUM SERPL-SCNC: 4.5 MMOL/L (ref 3.5–5.1)
PROT SERPL-MCNC: 6 GM/DL (ref 6–8.4)
RBC # BLD AUTO: 3.29 M/UL (ref 4.6–6.2)
RELATIVE EOSINOPHIL (SMH): 3.2 % (ref 0–8)
RELATIVE LYMPHOCYTE (SMH): 15.8 % (ref 18–48)
RELATIVE MONOCYTE (SMH): 12.6 % (ref 4–15)
RELATIVE NEUTROPHIL (SMH): 66.2 % (ref 38–73)
RETICS/RBC NFR AUTO: 1.5 % (ref 0.4–2)
SODIUM SERPL-SCNC: 131 MMOL/L (ref 136–145)
VANCOMYCIN TROUGH SERPL-MCNC: 13.1 UG/ML (ref ?–20)
VIT B12 SERPL-MCNC: 102 PG/ML (ref 210–950)
WBC # BLD AUTO: 0.95 K/UL (ref 3.9–12.7)

## 2025-07-16 PROCEDURE — 25000003 PHARM REV CODE 250: Performed by: HOSPITALIST

## 2025-07-16 PROCEDURE — 99233 SBSQ HOSP IP/OBS HIGH 50: CPT | Mod: ,,, | Performed by: INTERNAL MEDICINE

## 2025-07-16 PROCEDURE — 25000003 PHARM REV CODE 250: Performed by: INTERNAL MEDICINE

## 2025-07-16 PROCEDURE — 97535 SELF CARE MNGMENT TRAINING: CPT

## 2025-07-16 PROCEDURE — 94761 N-INVAS EAR/PLS OXIMETRY MLT: CPT

## 2025-07-16 PROCEDURE — 99223 1ST HOSP IP/OBS HIGH 75: CPT | Mod: ,,, | Performed by: INTERNAL MEDICINE

## 2025-07-16 PROCEDURE — 36415 COLL VENOUS BLD VENIPUNCTURE: CPT | Performed by: INTERNAL MEDICINE

## 2025-07-16 PROCEDURE — 99900035 HC TECH TIME PER 15 MIN (STAT)

## 2025-07-16 PROCEDURE — 82607 VITAMIN B-12: CPT | Performed by: INTERNAL MEDICINE

## 2025-07-16 PROCEDURE — 82728 ASSAY OF FERRITIN: CPT | Performed by: INTERNAL MEDICINE

## 2025-07-16 PROCEDURE — 85025 COMPLETE CBC W/AUTO DIFF WBC: CPT | Performed by: INTERNAL MEDICINE

## 2025-07-16 PROCEDURE — 27000207 HC ISOLATION

## 2025-07-16 PROCEDURE — 83605 ASSAY OF LACTIC ACID: CPT | Performed by: HOSPITALIST

## 2025-07-16 PROCEDURE — 20600001 HC STEP DOWN PRIVATE ROOM

## 2025-07-16 PROCEDURE — 83735 ASSAY OF MAGNESIUM: CPT | Performed by: INTERNAL MEDICINE

## 2025-07-16 PROCEDURE — 84443 ASSAY THYROID STIM HORMONE: CPT | Performed by: INTERNAL MEDICINE

## 2025-07-16 PROCEDURE — 80202 ASSAY OF VANCOMYCIN: CPT | Performed by: HOSPITALIST

## 2025-07-16 PROCEDURE — 63600175 PHARM REV CODE 636 W HCPCS: Performed by: INTERNAL MEDICINE

## 2025-07-16 PROCEDURE — 99900031 HC PATIENT EDUCATION (STAT)

## 2025-07-16 PROCEDURE — 80053 COMPREHEN METABOLIC PANEL: CPT | Performed by: INTERNAL MEDICINE

## 2025-07-16 PROCEDURE — 63600175 PHARM REV CODE 636 W HCPCS: Performed by: HOSPITALIST

## 2025-07-16 PROCEDURE — 36415 COLL VENOUS BLD VENIPUNCTURE: CPT | Performed by: HOSPITALIST

## 2025-07-16 PROCEDURE — 97116 GAIT TRAINING THERAPY: CPT | Mod: CQ

## 2025-07-16 PROCEDURE — 25000003 PHARM REV CODE 250

## 2025-07-16 PROCEDURE — 85045 AUTOMATED RETICULOCYTE COUNT: CPT | Performed by: INTERNAL MEDICINE

## 2025-07-16 RX ORDER — THIAMINE HYDROCHLORIDE 100 MG/ML
100 INJECTION, SOLUTION INTRAMUSCULAR; INTRAVENOUS ONCE
Status: COMPLETED | OUTPATIENT
Start: 2025-07-17 | End: 2025-07-17

## 2025-07-16 RX ORDER — CYANOCOBALAMIN 1000 UG/ML
1000 INJECTION, SOLUTION INTRAMUSCULAR; SUBCUTANEOUS DAILY
Status: COMPLETED | OUTPATIENT
Start: 2025-07-17 | End: 2025-07-19

## 2025-07-16 RX ADMIN — ENOXAPARIN SODIUM 40 MG: 40 INJECTION SUBCUTANEOUS at 06:07

## 2025-07-16 RX ADMIN — SENNOSIDES AND DOCUSATE SODIUM 1 TABLET: 50; 8.6 TABLET ORAL at 08:07

## 2025-07-16 RX ADMIN — DOXYCYCLINE 100 MG: 100 CAPSULE ORAL at 08:07

## 2025-07-16 RX ADMIN — POLYETHYLENE GLYCOL 3350 17 G: 17 POWDER, FOR SOLUTION ORAL at 09:07

## 2025-07-16 RX ADMIN — ACETAMINOPHEN 650 MG: 325 TABLET ORAL at 08:07

## 2025-07-16 RX ADMIN — Medication 800 MG: at 05:07

## 2025-07-16 RX ADMIN — VANCOMYCIN HYDROCHLORIDE 1500 MG: 1.5 INJECTION, POWDER, LYOPHILIZED, FOR SOLUTION INTRAVENOUS at 08:07

## 2025-07-16 RX ADMIN — VANCOMYCIN HYDROCHLORIDE 1500 MG: 1.5 INJECTION, POWDER, LYOPHILIZED, FOR SOLUTION INTRAVENOUS at 11:07

## 2025-07-16 RX ADMIN — Medication 6 MG: at 08:07

## 2025-07-16 RX ADMIN — MEROPENEM 1 G: 1 INJECTION INTRAVENOUS at 06:07

## 2025-07-16 RX ADMIN — MEROPENEM 1 G: 1 INJECTION INTRAVENOUS at 09:07

## 2025-07-16 RX ADMIN — DOXYCYCLINE 100 MG: 100 CAPSULE ORAL at 09:07

## 2025-07-16 RX ADMIN — PANTOPRAZOLE SODIUM 40 MG: 40 TABLET, DELAYED RELEASE ORAL at 05:07

## 2025-07-16 RX ADMIN — METHADONE HYDROCHLORIDE 110 MG: 10 TABLET ORAL at 09:07

## 2025-07-16 RX ADMIN — MEROPENEM 1 G: 1 INJECTION INTRAVENOUS at 12:07

## 2025-07-16 RX ADMIN — SENNOSIDES AND DOCUSATE SODIUM 1 TABLET: 50; 8.6 TABLET ORAL at 09:07

## 2025-07-16 RX ADMIN — Medication: at 09:07

## 2025-07-16 NOTE — PLAN OF CARE
Elevated temp and antibiotics discussed    Plan of care if temp keeps elevating discussed       07/16/25 1033   Rounds   Attendance Provider;Nurse ;Assigned nurse   Discharge Plan A Home Health   Why the patient remains in the hospital Requires continued medical care   Transition of Care Barriers None

## 2025-07-16 NOTE — ASSESSMENT & PLAN NOTE
Anemia is likely due to Iron deficiency and chronic disease due to crohn's. Most recent hemoglobin and hematocrit are listed below.  Recent Labs     07/14/25  0358 07/15/25  0430 07/16/25  0423   HGB 7.9* 9.0* 7.8*   HCT 25.7* 28.0* 25.1*     Plan  - Monitor serial CBC: Daily  - Transfuse PRBC if patient becomes hemodynamically unstable, symptomatic or H/H drops below 7/21.  - Patient has received 1 units of PRBCs on 7/12/25  - Patient's anemia is currently stable

## 2025-07-16 NOTE — SUBJECTIVE & OBJECTIVE
Interval History:  Patient seen and examined.  Continues to have high spiking fevers throughout the night and this morning.  Plan of care discussed in detail with Infectious Disease.    Review of Systems  Objective:     Vital Signs (Most Recent):  Temp: 98.1 °F (36.7 °C) (07/16/25 1100)  Pulse: 72 (07/16/25 1100)  Resp: 19 (07/16/25 1100)  BP: (!) 111/56 (07/16/25 1100)  SpO2: 100 % (07/16/25 1100) Vital Signs (24h Range):  Temp:  [97.2 °F (36.2 °C)-103.6 °F (39.8 °C)] 98.1 °F (36.7 °C)  Pulse:  [69-97] 72  Resp:  [14-23] 19  SpO2:  [93 %-100 %] 100 %  BP: (104-151)/(53-84) 111/56     Weight: 93.8 kg (206 lb 12.7 oz)  Body mass index is 27.28 kg/m².    Intake/Output Summary (Last 24 hours) at 7/16/2025 1337  Last data filed at 7/16/2025 1235  Gross per 24 hour   Intake 1009.91 ml   Output 1830 ml   Net -820.09 ml         Physical Exam  Vitals and nursing note reviewed.   Eyes:      Pupils: Pupils are equal, round, and reactive to light.   Neck:      Vascular: No JVD.   Cardiovascular:      Rate and Rhythm: Normal rate and regular rhythm.      Heart sounds: Normal heart sounds.   Pulmonary:      Effort: Pulmonary effort is normal.      Breath sounds: Normal breath sounds.   Abdominal:      General: Bowel sounds are normal. There is distension.      Palpations: Abdomen is soft.      Tenderness: There is no abdominal tenderness. There is no guarding or rebound.   Musculoskeletal:         General: No deformity.   Lymphadenopathy:      Cervical: No cervical adenopathy.   Skin:     Coloration: Skin is not pale.      Findings: No rash.   Neurological:      Mental Status: He is oriented to person, place, and time. Mental status is at baseline.               Significant Labs: All pertinent labs within the past 24 hours have been reviewed.    Significant Imaging: I have reviewed all pertinent imaging results/findings within the past 24 hours.

## 2025-07-16 NOTE — ASSESSMENT & PLAN NOTE
Patient has Abnormal Magnesium: hypomagnesemia. Will continue to monitor electrolytes closely. Will replace the affected electrolytes and repeat labs to be done after interventions completed. The patient's magnesium results have been reviewed and are listed below.  Recent Labs   Lab 07/16/25  0423   MG 1.8

## 2025-07-16 NOTE — PROGRESS NOTES
Pharmacokinetic Assessment Follow Up: IV Vancomycin    Vancomycin serum concentration assessment(s):    The trough level was drawn correctly and can be used to guide therapy at this time. The measurement is below the desired definitive target range of 15 to 20 mcg/mL.    Vancomycin Regimen Plan:    Continue regimen to Vancomycin 1500 mg IV every 12 hours with next serum trough concentration measured at 0900 prior to 3rd dose on 7/17    Drug levels (last 3 results):  Recent Labs   Lab Result Units 07/16/25  0903   Vancomycin Trough ug/ml 13.1       Pharmacy will continue to follow and monitor vancomycin.    Please contact pharmacy at extension [phone] for questions regarding this assessment.    Thank you for the consult,   Mariel Lunsford       Patient brief summary:  Stevie Cristina is a 70 y.o. male initiated on antimicrobial therapy with IV Vancomycin for treatment of bacteremia    The patient's current regimen is 1500mg q12h    Drug Allergies:   Review of patient's allergies indicates:  No Known Allergies    Actual Body Weight:   93.8    Renal Function:   Estimated Creatinine Clearance: 107.8 mL/min (based on SCr of 0.7 mg/dL).,     Dialysis Method (if applicable):  N/A    CBC (last 72 hours):  Recent Labs   Lab Result Units 07/14/25  0358 07/15/25  0430 07/16/25  0423   WBC K/uL 2.51* 1.76* 0.95*   Hgb gm/dL 7.9* 9.0* 7.8*   Hct % 25.7* 28.0* 25.1*   Platelet Count K/uL 102* 105* 116*   Mono % % 6.4  --  12.6   Monocyte % %  --  4.0  --    Eos % % 3.2  --  3.2   Eosinophil % %  --  4.0  --    Basophil % % 0.4  --  1.1       Metabolic Panel (last 72 hours):  Recent Labs   Lab Result Units 07/14/25  0358 07/15/25  0430 07/16/25  0423   Sodium mmol/L 132* 132* 131*   Potassium mmol/L 4.4 4.6 4.5   Chloride mmol/L 99 97 98   CO2 mmol/L 27 28 29   Glucose mg/dL 76 97 72   BUN mg/dL 14 11 10   Creatinine mg/dL 0.6 0.8 0.7   Albumin g/dL 2.8* 3.1* 2.9*   Bilirubin Total mg/dL 1.1* 1.1* 1.0   ALP unit/L 67 88 77   AST  unit/L 17 19 18   ALT unit/L 13 13 12   Magnesium mg/dL 1.6 1.7 1.8       Vancomycin Administrations:  vancomycin given in the last 96 hours                     vancomycin 1,500 mg in 0.9% NaCl 250 mL IVPB (admixture device) (mg) 1,500 mg New Bag 07/15/25 2017    vancomycin (VANCOCIN) 2,000 mg in 0.9% NaCl 500 mL IVPB (admixture device) (mg) 2,000 mg New Bag 07/15/25 0953                    Microbiologic Results:  Microbiology Results (last 7 days)       Procedure Component Value Units Date/Time    Blood culture [[id number]]  (Normal) Collected: 07/14/25 0847    Order Status: Completed Specimen: Blood from Peripheral, Antecubital, Right Updated: 07/16/25 1002     CULTURE, BLOOD ([facility]) No Growth After 48 Hours    Blood culture [[id number]]  (Normal) Collected: 07/15/25 1743    Order Status: Completed Specimen: Blood from Peripheral, Antecubital, Left Updated: 07/16/25 0103     CULTURE, BLOOD ([facility]) No Growth After 6 Hours    Culture, Respiratory with Gram Stain [[id number]]     Order Status: Sent Specimen: Respiratory from Sputum, Expectorated     MRSA Screen by PCR [[id number]]     Order Status: Sent Specimen: Nasal Swab     Blood culture x two cultures. Draw prior to antibiotics [[id number]]  (Normal) Collected: 07/11/25 1254    Order Status: Completed Specimen: Blood from Peripheral, Forearm, Left Updated: 07/14/25 1402     CULTURE, BLOOD ([facility]) No Growth After 72 Hours    Blood culture x two cultures. Draw prior to antibiotics [[id number]]  (Normal) Collected: 07/11/25 1259    Order Status: Completed Specimen: Blood from Peripheral, Hand, Left Updated: 07/14/25 1402     CULTURE, BLOOD ([facility]) No Growth After 72 Hours    MRSA Screen by PCR [[id number]]     Order Status: Canceled Specimen: Nasal Swab     Influenza A & B by Molecular [[id number]]  (Normal) Collected: 07/11/25 1409    Order Status: Completed Specimen: Nasopharyngeal Swab Updated: 07/11/25 1435     INFLUENZA A MOLECULAR Negative     INFLUENZA B MOLECULAR   Negative    Blood culture x two cultures. Draw prior to antibiotics. [[id number]]     Order Status: Canceled Specimen: Blood     Blood culture x two cultures. Draw prior to antibiotics. [[id number]]     Order Status: Canceled Specimen: Blood

## 2025-07-16 NOTE — ASSESSMENT & PLAN NOTE
"Patient has a diagnosis of pneumonia. The cause of the pneumonia is suspected to be bacterial in etiology but organism is not known. The pneumonia is worsening due to fever and hypoxia. The patient has the following signs/symptoms of pneumonia: persistent hypoxia , cough, and shortness of breath. The patient does have a current oxygen requirement and the patient does not have a home oxygen requirement. I have reviewed the pertinent imaging. The following cultures have been collected: Blood cultures and Sputum culture The culture results are listed below.     Current antimicrobial regimen consists of the antibiotics listed below. Will monitor patient closely and Adjust treatment plan as follows escalate antibiotics to include linezolid and Zosyn    Antibiotics (From admission, onward)      Start     Stop Route Frequency Ordered    07/15/25 2200  vancomycin 1,500 mg in 0.9% NaCl 250 mL IVPB (admixture device)         -- IV Every 12 hours (non-standard times) 07/15/25 0900    07/15/25 2100  doxycycline capsule 100 mg         -- Oral 2 times daily 07/15/25 1955    07/15/25 0945  meropenem injection 1 g         -- IV Every 8 hours (non-standard times) 07/15/25 0843    07/15/25 0943  vancomycin - pharmacy to dose  (vancomycin IVPB (PEDS and ADULTS))        Placed in "And" Linked Group    -- IV pharmacy to manage frequency 07/15/25 0843          Escalated antibiotics on 07/15 secondary to clinical deterioration and worsened spiking fevers.  Infectious Disease also consulted.  If patient is not better by tomorrow may expand coverage to include fungal/yeast pathogens and potentially do CT chest abdomen pelvis to look for fluid collection.    "

## 2025-07-16 NOTE — PT/OT/SLP PROGRESS
Occupational Therapy   Treatment    Name: Stevie Cristina  MRN: [id number]  Admitting Diagnosis:  Acute pneumonia       Recommendations:     Discharge Recommendations: Low Intensity Therapy  Discharge Equipment Recommendations:  none  Barriers to discharge:   (medical status)    Assessment:     Stevie Cristina is a 70 y.o. male with a medical diagnosis of Acute pneumonia.  Pt is agreeable to OT treatment session this AM. Performance deficits affecting function are weakness, impaired endurance, impaired self care skills, impaired functional mobility, gait instability, impaired balance, decreased upper extremity function, decreased safety awareness, decreased lower extremity function, impaired cardiopulmonary response to activity. Increased time for self expression throughout session.    Rehab Prognosis:  Fair; patient would benefit from acute skilled OT services to address these deficits and reach maximum level of function.       Plan:     Patient to be seen 5 x/week to address the above listed problems via self-care/home management, therapeutic activities, therapeutic exercises  Plan of Care Expires: 08/14/25  Plan of Care Reviewed with: patient    Subjective     Chief Complaint: sweating d/t spikes in fever  Patient/Family Comments/goals: get a fresh gown and pillowcase which OT provided  Pain/Comfort:  Pain Rating 1: 0/10    Objective:     Communicated with: nursing prior to session.  Patient found HOB elevated with telemetry, bed alarm, pulse ox (continuous), blood pressure cuff, peripheral IV upon OT entry to room.    General Precautions: Standard, fall, neutropenic    Orthopedic Precautions:N/A  Braces: N/A  Respiratory Status: Room air     Occupational Performance:     Bed Mobility:    Patient completed Scooting/Bridging with stand by assistance  Patient completed Supine to Sit with stand by assistance  Patient completed Sit to Supine with stand by assistance       Activities of Daily  Living:  Grooming: stand by assistance to brush teeth and put on deodorant seated EOB  Upper Body Dressing: stand by assistance to don/doff gown seated EOB  Lower Body Dressing: total assistance to don/doff socks seated EOB; pt would benefit from education on hip kit to increase independence with LE dressing       Treatment & Education:  Pt educated on role of OT/POC, importance of OOB/EOB activity, use of call bell, and safety during ADLs, transfers, and functional mobility.    Patient left HOB elevated with all lines intact, call button in reach, bed alarm on, and nursing notified    GOALS:   Multidisciplinary Problems       Occupational Therapy Goals          Problem: Occupational Therapy    Goal Priority Disciplines Outcome Interventions   Occupational Therapy Goal     OT, PT/OT     Description: Goals to be met by: 8/15/25     Patient will increase functional independence with ADLs by performing:    UE Dressing with Supervision.  LE Dressing with Supervision.  Grooming while standing at sink with Supervision.  Toileting from toilet with Supervision for hygiene and clothing management.   Toilet transfer to toilet with Supervision.                         DME Justifications:  No DME recommended requiring DME justifications    Time Tracking:     OT Date of Treatment: 07/16/25  OT Start Time: 1100  OT Stop Time: 1129  OT Total Time (min): 29 min    Billable Minutes:Self Care/Home Management 29    OT/POONAM: OT          7/16/2025

## 2025-07-16 NOTE — PROGRESS NOTES
[facility] hospital Medicine  Progress Note    Patient Name: Stevie Cristina  MRN: [id number]  Patient Class: IP- Inpatient   Admission Date: 7/11/2025  Length of Stay: 4 days  Attending Physician: Sergey Gallardo MD  Primary Care Provider: Temitope, Primary Doctor        Subjective     Principal Problem:Acute pneumonia        HPI:  70 year old pt getting admitted with severe sepsis/pneumonia  Pt overall is a poor historian and was not confused when saw in ER  He said he started having Nausea/Vomiting/diarrhea to ER physician but denied this c/o to me  He said he only had fever/chills/cough  Imaging showed evidence of pneumonia   His troponin levels were elevated but denied chest pain  Pt will be admitted under observation status as per  instruction         Interval History:  Patient seen and examined.  Continues to have high spiking fevers throughout the night and this morning.  Plan of care discussed in detail with Infectious Disease.    Review of Systems  Objective:     Vital Signs (Most Recent):  Temp: 98.1 °F (36.7 °C) (07/16/25 1100)  Pulse: 72 (07/16/25 1100)  Resp: 19 (07/16/25 1100)  BP: (!) 111/56 (07/16/25 1100)  SpO2: 100 % (07/16/25 1100) Vital Signs (24h Range):  Temp:  [97.2 °F (36.2 °C)-103.6 °F (39.8 °C)] 98.1 °F (36.7 °C)  Pulse:  [69-97] 72  Resp:  [14-23] 19  SpO2:  [93 %-100 %] 100 %  BP: (104-151)/(53-84) 111/56     Weight: 93.8 kg (206 lb 12.7 oz)  Body mass index is 27.28 kg/m².    Intake/Output Summary (Last 24 hours) at 7/16/2025 1337  Last data filed at 7/16/2025 1235  Gross per 24 hour   Intake 1009.91 ml   Output 1830 ml   Net -820.09 ml         Physical Exam  Vitals and nursing note reviewed.   Eyes:      Pupils: Pupils are equal, round, and reactive to light.   Neck:      Vascular: No JVD.   Cardiovascular:      Rate and Rhythm: Normal rate and regular rhythm.      Heart sounds: Normal heart sounds.   Pulmonary:      Effort: Pulmonary effort is normal.       Breath sounds: Normal breath sounds.   Abdominal:      General: Bowel sounds are normal. There is distension.      Palpations: Abdomen is soft.      Tenderness: There is no abdominal tenderness. There is no guarding or rebound.   Musculoskeletal:         General: No deformity.   Lymphadenopathy:      Cervical: No cervical adenopathy.   Skin:     Coloration: Skin is not pale.      Findings: No rash.   Neurological:      Mental Status: He is oriented to person, place, and time. Mental status is at baseline.               Significant Labs: All pertinent labs within the past 24 hours have been reviewed.    Significant Imaging: I have reviewed all pertinent imaging results/findings within the past 24 hours.      Assessment & Plan  Acute pneumonia  Patient has a diagnosis of pneumonia. The cause of the pneumonia is suspected to be bacterial in etiology but organism is not known. The pneumonia is worsening due to fever and hypoxia. The patient has the following signs/symptoms of pneumonia: persistent hypoxia , cough, and shortness of breath. The patient does have a current oxygen requirement and the patient does not have a home oxygen requirement. I have reviewed the pertinent imaging. The following cultures have been collected: Blood cultures and Sputum culture The culture results are listed below.     Current antimicrobial regimen consists of the antibiotics listed below. Will monitor patient closely and Adjust treatment plan as follows escalate antibiotics to include linezolid and Zosyn    Antibiotics (From admission, onward)      Start     Stop Route Frequency Ordered    07/15/25 2200  vancomycin 1,500 mg in 0.9% NaCl 250 mL IVPB (admixture device)         -- IV Every 12 hours (non-standard times) 07/15/25 0900    07/15/25 2100  doxycycline capsule 100 mg         -- Oral 2 times daily 07/15/25 1955    07/15/25 0945  meropenem injection 1 g         -- IV Every 8 hours (non-standard times) 07/15/25 0843    07/15/25 0943   "vancomycin - pharmacy to dose  (vancomycin IVPB (PEDS and ADULTS))        Placed in "And" Linked Group    -- IV pharmacy to manage frequency 07/15/25 0843          Escalated antibiotics on 07/15 secondary to clinical deterioration and worsened spiking fevers.  Infectious Disease also consulted.  If patient is not better by tomorrow may expand coverage to include fungal/yeast pathogens and potentially do CT chest abdomen pelvis to look for fluid collection.    Crohn's disease  Noted, chronic Crohn disease.  No evidence of acute exacerbation currently.  Continue to monitor.    Open wound of left lower leg  Chronic issue for 2 years   Follow up outpatient wound care     Methadone dependence  Aware     Iron deficiency anemia  Anemia is likely due to Iron deficiency and chronic disease due to crohn's. Most recent hemoglobin and hematocrit are listed below.  Recent Labs     07/14/25  0358 07/15/25  0430 07/16/25  0423   HGB 7.9* 9.0* 7.8*   HCT 25.7* 28.0* 25.1*     Plan  - Monitor serial CBC: Daily  - Transfuse PRBC if patient becomes hemodynamically unstable, symptomatic or H/H drops below 7/21.  - Patient has received 1 units of PRBCs on 7/12/25  - Patient's anemia is currently stable  Hypomagnesemia  Patient has Abnormal Magnesium: hypomagnesemia. Will continue to monitor electrolytes closely. Will replace the affected electrolytes and repeat labs to be done after interventions completed. The patient's magnesium results have been reviewed and are listed below.  Recent Labs   Lab 07/16/25  0423   MG 1.8      Hyponatremia  Hyponatremia is likely due to SIADH secondary to pneumonia. The patient's most recent sodium results are listed below.  Recent Labs     07/14/25  0358 07/15/25  0430 07/16/25  0423   * 132* 131*     Plan  - Correct the sodium by 4-6mEq in 24 hours.   - Monitor sodium Daily.   - Patient hyponatremia is worsening. Will continue current treatment  Pancytopenia  This patient is found to have " pancytopenia, the likely etiology is Drug induced (including chemotherapy) related to medication for his Crohn's disease, will monitor CBC Daily. Will transfuse red blood cells if the hemoglobin is <7g/dL (or <8 in the setting of ACS). Will transfuse platelets if platelet count is <10k. Hold DVT prophylaxis if platelets are <50k. The patient's hemoglobin, white blood cell count, and platelet count results have been reviewed and are listed below.  Recent Labs   Lab 07/16/25  0423   HGB 7.8*   WBC 0.95*   *       Cirrhosis  Patient with known Cirrhosis with Child's class C. Co-morbidities are present and inclusive of ascites, malnutrition, and anemia/pancytopenia.  MELD-Na score calculated; MELD 3.0: 8 at 1/19/2025  6:14 AM  MELD-Na: 7 at 1/19/2025  6:14 AM  Calculated from:  Serum Creatinine: 0.7 mg/dL (Using min of 1 mg/dL) at 1/19/2025  6:14 AM  Serum Sodium: 137 mmol/L at 1/19/2025  6:14 AM  Total Bilirubin: 0.9 mg/dL (Using min of 1 mg/dL) at 1/19/2025  6:14 AM  Serum Albumin: 2.8 g/dL at 1/19/2025  6:14 AM  INR(ratio): 1.1 at 1/17/2025  4:20 AM  Age at listing (hypothetical): 69 years  Sex: Male at 1/19/2025  6:14 AM      Continue chronic meds. Etiology likely Hepatitis. Will avoid any hepatotoxic meds, and monitor CBC/CMP/INR for synthetic function.     VTE Risk Mitigation (From admission, onward)           Ordered     enoxaparin injection 40 mg  Daily         07/11/25 2045     IP VTE HIGH RISK PATIENT  Once         07/11/25 1704     Place sequential compression device  Until discontinued         07/11/25 1704                    Discharge Planning   JUNE: 7/20/2025     Code Status: Full Code   Medical Readiness for Discharge Date:   Discharge Plan A: Home Health                  Please place Justification for DME      Sergey Gallardo MD  Department of Hospital Medicine   [facility]

## 2025-07-16 NOTE — ASSESSMENT & PLAN NOTE
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 The patient's most recent sodium results are listed below.  Recent Labs     07/14/25  0358 07/15/25  0430 07/16/25  0423   * 132* 131*     Plan  - Correct the sodium by 4-6mEq in 24 hours.   - Monitor sodium Daily.   - Patient hyponatremia is worsening. Will continue current treatment

## 2025-07-16 NOTE — PT/OT/SLP PROGRESS
Physical Therapy Treatment    Patient Name:  Stevie Cristina   MRN:  [id number]    Recommendations:     Discharge Recommendations: Low Intensity Therapy (Home Health PT)  Discharge Equipment Recommendations: cane, straight  Barriers to discharge: None    Assessment:     Stevie Cristina is a 70 y.o. male admitted with a medical diagnosis of Acute pneumonia.  He presents with the following impairments/functional limitations: impaired endurance, impaired self care skills, weakness, impaired functional mobility, gait instability, decreased safety awareness, impaired cardiopulmonary response to activity . Pt with improved balance today, he was able to ambulate without the use of AD. He did reports leg weakness following gait trial.     Rehab Prognosis: Fair; patient would benefit from acute skilled PT services to address these deficits and reach maximum level of function.    Recent Surgery: * No surgery found *      Plan:     During this hospitalization, patient to be seen 5 x/week to address the identified rehab impairments via gait training, therapeutic activities, therapeutic exercises and progress toward the following goals:    Plan of Care Expires:  08/14/25    Subjective     Chief Complaint: None stated  Patient/Family Comments/goals: I feel much better now that this fever is gone.   Pain/Comfort:  Pain Rating 1: 0/10      Objective:     Communicated with RN prior to session.  Patient found HOB elevated with telemetry, peripheral IV, pulse ox (continuous), blood pressure cuff upon PT entry to room.     General Precautions: Standard, fall  Orthopedic Precautions: N/A  Braces: N/A  Respiratory Status: Room air     Functional Mobility:  Bed Mobility:     Supine to Sit: supervision  Sit to Supine: supervision  Transfers:     Sit to Stand:  stand by assistance with no AD  Gait: 75 feet CGA no AD      AM-PAC 6 CLICK MOBILITY          Treatment & Education:  Pt was educated on the following: call light use,  importance of OOB activity and functional mobility to negate the negative effects of prolonged bed rest during this hospitalization, safe transfers/ambulation and discharge planning recommendations/options.     Patient left HOB elevated with all lines intact, call button in reach, and bed alarm on..    GOALS:   Multidisciplinary Problems       Physical Therapy Goals          Problem: Physical Therapy    Goal Priority Disciplines Outcome Interventions   Physical Therapy Goal     PT, PT/OT Ongoing    Description: Goals to be met by: 25     Patient will increase functional independence with mobility by performin. Sit to stand transfer with Modified Spicer  2. Gait  x 200 feet with Modified Spicer using Single-point Cane.   3. Ascend/descend 3 stair with right Handrails Stand-by Assistance using Single-point Cane .                            Time Tracking:     PT Received On: 25  PT Start Time: 1140     PT Stop Time: 1157  PT Total Time (min): 17 min     Billable Minutes: Gait Training 17    Treatment Type: Treatment  PT/PTA: PTA     Number of PTA visits since last PT visit: 2     2025
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
## 2025-07-16 NOTE — ASSESSMENT & PLAN NOTE
This patient is found to have pancytopenia, the likely etiology is Drug induced (including chemotherapy) related to medication for his Crohn's disease, will monitor CBC Daily. Will transfuse red blood cells if the hemoglobin is <7g/dL (or <8 in the setting of ACS). Will transfuse platelets if platelet count is <10k. Hold DVT prophylaxis if platelets are <50k. The patient's hemoglobin, white blood cell count, and platelet count results have been reviewed and are listed below.  Recent Labs   Lab 07/16/25  0423   HGB 7.8*   WBC 0.95*   *
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
## 2025-07-16 NOTE — ASSESSMENT & PLAN NOTE
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 Co-morbidities are present and inclusive of ascites, malnutrition, and anemia/pancytopenia.  MELD-Na score calculated; MELD 3.0: 8 at 1/19/2025  6:14 AM  MELD-Na: 7 at 1/19/2025  6:14 AM  Calculated from:  Serum Creatinine: 0.7 mg/dL (Using min of 1 mg/dL) at 1/19/2025  6:14 AM  Serum Sodium: 137 mmol/L at 1/19/2025  6:14 AM  Total Bilirubin: 0.9 mg/dL (Using min of 1 mg/dL) at 1/19/2025  6:14 AM  Serum Albumin: 2.8 g/dL at 1/19/2025  6:14 AM  INR(ratio): 1.1 at 1/17/2025  4:20 AM  Age at listing (hypothetical): 69 years  Sex: Male at 1/19/2025  6:14 AM      Continue chronic meds. Etiology likely Hepatitis. Will avoid any hepatotoxic meds, and monitor CBC/CMP/INR for synthetic function.

## 2025-07-17 LAB
ALBUMIN SERPL-MCNC: 2.9 G/DL (ref 3.5–5.2)
ALP SERPL-CCNC: 72 UNIT/L (ref 55–135)
ALT SERPL-CCNC: 11 UNIT/L (ref 10–44)
ANION GAP (SMH): 4 MMOL/L (ref 8–16)
AST SERPL-CCNC: 18 UNIT/L (ref 10–40)
BILIRUB SERPL-MCNC: 0.8 MG/DL (ref 0.1–1)
BUN SERPL-MCNC: 17 MG/DL (ref 8–23)
CALCIUM SERPL-MCNC: 8 MG/DL (ref 8.7–10.5)
CHLORIDE SERPL-SCNC: 99 MMOL/L (ref 95–110)
CO2 SERPL-SCNC: 30 MMOL/L (ref 23–29)
CREAT SERPL-MCNC: 0.7 MG/DL (ref 0.5–1.4)
EOSINOPHIL NFR BLD MANUAL: 8 % (ref 0–8)
ERYTHROCYTE [DISTWIDTH] IN BLOOD BY AUTOMATED COUNT: 18.4 % (ref 11.5–14.5)
FERRITIN SERPL-MCNC: 56.9 NG/ML (ref 20–300)
FOLATE SERPL-MCNC: 11.6 NG/ML (ref 4–24)
GFR SERPLBLD CREATININE-BSD FMLA CKD-EPI: >60 ML/MIN/1.73/M2
GLUCOSE SERPL-MCNC: 86 MG/DL (ref 70–110)
HCT VFR BLD AUTO: 26.4 % (ref 40–54)
HGB BLD-MCNC: 8 GM/DL (ref 14–18)
LACTATE SERPL-SCNC: 0.6 MMOL/L (ref 0.5–1.9)
LYMPHOCYTES NFR BLD MANUAL: 18 % (ref 18–48)
MAGNESIUM SERPL-MCNC: 1.9 MG/DL (ref 1.6–2.6)
MCH RBC QN AUTO: 23.9 PG (ref 27–31)
MCHC RBC AUTO-ENTMCNC: 30.3 G/DL (ref 32–36)
MCV RBC AUTO: 79 FL (ref 82–98)
MONOCYTES NFR BLD MANUAL: 5 % (ref 4–15)
NEUTROPHILS NFR BLD MANUAL: 69 % (ref 38–73)
NUCLEATED RBC (/100WBC) (SMH): 0 /100 WBC
PLATELET # BLD AUTO: 108 K/UL (ref 150–450)
PLATELET BLD QL SMEAR: ABNORMAL
PMV BLD AUTO: ABNORMAL FL
POTASSIUM SERPL-SCNC: 4.3 MMOL/L (ref 3.5–5.1)
PROT SERPL-MCNC: 5.9 GM/DL (ref 6–8.4)
RBC # BLD AUTO: 3.35 M/UL (ref 4.6–6.2)
SODIUM SERPL-SCNC: 133 MMOL/L (ref 136–145)
TSH SERPL-ACNC: 1.94 UIU/ML (ref 0.34–5.6)
VANCOMYCIN TROUGH SERPL-MCNC: 16.3 UG/ML (ref ?–20)
WBC # BLD AUTO: 1.03 K/UL (ref 3.9–12.7)

## 2025-07-17 PROCEDURE — 25000003 PHARM REV CODE 250: Performed by: INTERNAL MEDICINE

## 2025-07-17 PROCEDURE — 63600175 PHARM REV CODE 636 W HCPCS: Performed by: HOSPITALIST

## 2025-07-17 PROCEDURE — 84425 ASSAY OF VITAMIN B-1: CPT | Performed by: INTERNAL MEDICINE

## 2025-07-17 PROCEDURE — 82746 ASSAY OF FOLIC ACID SERUM: CPT | Performed by: INTERNAL MEDICINE

## 2025-07-17 PROCEDURE — 25000003 PHARM REV CODE 250: Performed by: HOSPITALIST

## 2025-07-17 PROCEDURE — 25000003 PHARM REV CODE 250

## 2025-07-17 PROCEDURE — 97535 SELF CARE MNGMENT TRAINING: CPT

## 2025-07-17 PROCEDURE — 84075 ASSAY ALKALINE PHOSPHATASE: CPT | Performed by: INTERNAL MEDICINE

## 2025-07-17 PROCEDURE — 63600175 PHARM REV CODE 636 W HCPCS: Performed by: INTERNAL MEDICINE

## 2025-07-17 PROCEDURE — 99233 SBSQ HOSP IP/OBS HIGH 50: CPT | Mod: ,,, | Performed by: INTERNAL MEDICINE

## 2025-07-17 PROCEDURE — 97116 GAIT TRAINING THERAPY: CPT | Mod: CQ

## 2025-07-17 PROCEDURE — 36415 COLL VENOUS BLD VENIPUNCTURE: CPT | Performed by: INTERNAL MEDICINE

## 2025-07-17 PROCEDURE — 80202 ASSAY OF VANCOMYCIN: CPT | Performed by: HOSPITALIST

## 2025-07-17 PROCEDURE — 83605 ASSAY OF LACTIC ACID: CPT | Performed by: HOSPITALIST

## 2025-07-17 PROCEDURE — 20600001 HC STEP DOWN PRIVATE ROOM

## 2025-07-17 PROCEDURE — 94761 N-INVAS EAR/PLS OXIMETRY MLT: CPT

## 2025-07-17 PROCEDURE — 99900031 HC PATIENT EDUCATION (STAT)

## 2025-07-17 PROCEDURE — 85025 COMPLETE CBC W/AUTO DIFF WBC: CPT | Performed by: INTERNAL MEDICINE

## 2025-07-17 PROCEDURE — 84207 ASSAY OF VITAMIN B-6: CPT | Performed by: INTERNAL MEDICINE

## 2025-07-17 PROCEDURE — 27000207 HC ISOLATION

## 2025-07-17 PROCEDURE — 82668 ASSAY OF ERYTHROPOIETIN: CPT | Performed by: INTERNAL MEDICINE

## 2025-07-17 PROCEDURE — 83735 ASSAY OF MAGNESIUM: CPT | Performed by: INTERNAL MEDICINE

## 2025-07-17 RX ORDER — MEROPENEM 1 G/1
1 INJECTION, POWDER, FOR SOLUTION INTRAVENOUS
Status: DISCONTINUED | OUTPATIENT
Start: 2025-07-17 | End: 2025-07-17

## 2025-07-17 RX ORDER — AMOXICILLIN AND CLAVULANATE POTASSIUM 875; 125 MG/1; MG/1
1 TABLET, FILM COATED ORAL EVERY 12 HOURS
Status: DISCONTINUED | OUTPATIENT
Start: 2025-07-17 | End: 2025-07-19 | Stop reason: HOSPADM

## 2025-07-17 RX ADMIN — MEROPENEM 1 G: 1 INJECTION INTRAVENOUS at 11:07

## 2025-07-17 RX ADMIN — PANTOPRAZOLE SODIUM 40 MG: 40 TABLET, DELAYED RELEASE ORAL at 08:07

## 2025-07-17 RX ADMIN — POLYETHYLENE GLYCOL 3350 17 G: 17 POWDER, FOR SOLUTION ORAL at 08:07

## 2025-07-17 RX ADMIN — ACETAMINOPHEN 650 MG: 325 TABLET ORAL at 08:07

## 2025-07-17 RX ADMIN — VANCOMYCIN HYDROCHLORIDE 1500 MG: 1.5 INJECTION, POWDER, LYOPHILIZED, FOR SOLUTION INTRAVENOUS at 09:07

## 2025-07-17 RX ADMIN — THIAMINE HYDROCHLORIDE 100 MG: 100 INJECTION, SOLUTION INTRAMUSCULAR; INTRAVENOUS at 08:07

## 2025-07-17 RX ADMIN — DOXYCYCLINE 100 MG: 100 CAPSULE ORAL at 08:07

## 2025-07-17 RX ADMIN — Medication 6 MG: at 08:07

## 2025-07-17 RX ADMIN — Medication: at 09:07

## 2025-07-17 RX ADMIN — AMOXICILLIN AND CLAVULANATE POTASSIUM 1 TABLET: 875; 125 TABLET, FILM COATED ORAL at 08:07

## 2025-07-17 RX ADMIN — SENNOSIDES AND DOCUSATE SODIUM 1 TABLET: 50; 8.6 TABLET ORAL at 08:07

## 2025-07-17 RX ADMIN — ENOXAPARIN SODIUM 40 MG: 40 INJECTION SUBCUTANEOUS at 05:07

## 2025-07-17 RX ADMIN — CYANOCOBALAMIN 1000 MCG: 1000 INJECTION INTRAMUSCULAR; SUBCUTANEOUS at 08:07

## 2025-07-17 RX ADMIN — MEROPENEM 1 G: 1 INJECTION INTRAVENOUS at 03:07

## 2025-07-17 RX ADMIN — METHADONE HYDROCHLORIDE 110 MG: 10 TABLET ORAL at 08:07

## 2025-07-17 NOTE — PLAN OF CARE
Pt has been afebrile. Looks better.    WBCs still low       07/17/25 1033   Rounds   Attendance Provider;Nurse ;Assigned nurse   Discharge Plan A Home Health   Why the patient remains in the hospital Requires continued medical care   Transition of Care Barriers None

## 2025-07-17 NOTE — PROGRESS NOTES
Pharmacy Consult Discontinuation: Vancomycin    Stevie Cristina [id number] is a 70 y.o. male was consulted for vancomycin pharmacotherapy management by pharmacy.    Pharmacy consult for vancomycin dosing is no longer required.  Vancomycin was discontinued 07/17/2025.    Thank you for allowing us to participate in this patient's care. Should you have any questions or concerns please feel free to contact the pharmacy department at [phone].    Renny Leary, RamonitaD

## 2025-07-17 NOTE — PT/OT/SLP PROGRESS
"Occupational Therapy   Treatment    Name: Stevie Cristina  MRN: [id number]  Admitting Diagnosis:  Acute pneumonia       Recommendations:     Discharge Recommendations: Low Intensity Therapy  Discharge Equipment Recommendations:  none  Barriers to discharge:  None    Assessment:     Stevie Cristina is a 70 y.o. male with a medical diagnosis of Acute pneumonia. Performance deficits affecting function are weakness, impaired endurance, impaired self care skills, impaired functional mobility, gait instability, impaired balance, decreased upper extremity function, decreased safety awareness, decreased lower extremity function, impaired cardiopulmonary response to activity.     Rehab Prognosis:  Good; patient would benefit from acute skilled OT services to address these deficits and reach maximum level of function.       Plan:     Patient to be seen 5 x/week to address the above listed problems via self-care/home management, therapeutic activities, therapeutic exercises  Plan of Care Expires: 08/14/25  Plan of Care Reviewed with: patient    Subjective     Chief Complaint: "I'm ready to go home"  Patient/Family Comments/goals: none stated  Pain/Comfort:       Objective:     Communicated with: nurse prior to session.  Patient found HOB elevated with telemetry, peripheral IV upon OT entry to room.    General Precautions: Standard, fall, neutropenic    Orthopedic Precautions:N/A  Braces: N/A  Respiratory Status: Room air     Occupational Performance:     Bed Mobility:    Patient completed Supine to Sit with stand by assistance  Patient completed Sit to Supine with stand by assistance     Functional Mobility/Transfers:  Patient completed Sit <> Stand Transfer with stand by assistance  with  no assistive device   Patient completed Toilet Transfer Step Transfer technique with stand by assistance with  no AD      Activities of Daily Living:  Grooming: stand by assistance for washing hands and combing hair in standing at " sink  Toileting: supervision     LB  dressing SBA for changing brief in sitting    [facility] 6 Click ADL:      Treatment & Education:  Therapist provided facilitation and instruction of proper body mechanics and fall prevention strategies during tasks listed above.   Instructed patient to sit in bedside chair as tolerated daily to increase OOB/activity tolerance.   Instructed patient to use call light to have nursing staff assist with needs/transfers.   Discussed OT POC and answered all questions within OT scope of practice.        Patient left HOB elevated with all lines intact, call button in reach, and bed alarm on    GOALS:   Multidisciplinary Problems       Occupational Therapy Goals          Problem: Occupational Therapy    Goal Priority Disciplines Outcome Interventions   Occupational Therapy Goal     OT, PT/OT Progressing    Description: Goals to be met by: 8/15/25     Patient will increase functional independence with ADLs by performing:    UE Dressing with Supervision.  LE Dressing with Supervision.  Grooming while standing at sink with Supervision.  Toileting from toilet with Supervision for hygiene and clothing management.   Toilet transfer to toilet with Supervision.                         DME Justifications:  No DME recommended requiring DME justifications    Time Tracking:     OT Date of Treatment: 07/17/25  OT Start Time: 1504  OT Stop Time: 1527  OT Total Time (min): 23 min    Billable Minutes:Self Care/Home Management 23    OT/POONAM: OT          7/17/2025
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
## 2025-07-17 NOTE — PROGRESS NOTES
Pharmacokinetic Assessment Follow Up: IV Vancomycin    Vancomycin serum concentration assessment(s):    The trough level was drawn correctly and can be used to guide therapy at this time. The measurement is within the desired definitive target range of 15 to 20 mcg/mL.    Vancomycin Regimen Plan:    Continue regimen to Vancomycin 1500 mg IV every 12 hours with next serum trough concentration measured at 0900 prior to 3rd dose on 7/18    Drug levels (last 3 results):  Recent Labs   Lab Result Units 07/16/25  0903 07/17/25  0830   Vancomycin Trough ug/ml 13.1 16.3       Pharmacy will continue to follow and monitor vancomycin.    Please contact pharmacy at extension [phone] for questions regarding this assessment.    Thank you for the consult,   Livia Ellis       Patient brief summary:  Stevie Cristina is a 70 y.o. male initiated on antimicrobial therapy with IV Vancomycin for treatment of lower respiratory infection    The patient's current regimen is 1500 mg every 12 hours    Drug Allergies:   Review of patient's allergies indicates:  No Known Allergies    Actual Body Weight:   93.8    Renal Function:   Estimated Creatinine Clearance: 107.8 mL/min (based on SCr of 0.7 mg/dL).,     Dialysis Method (if applicable):  N/A    CBC (last 72 hours):  Recent Labs   Lab Result Units 07/15/25  0430 07/16/25  0423 07/17/25  0452   WBC K/uL 1.76* 0.95* 1.03*   Hgb gm/dL 9.0* 7.8* 8.0*   Hct % 28.0* 25.1* 26.4*   Platelet Count K/uL 105* 116* 108*   Mono % %  --  12.6  --    Monocyte % % 4.0  --  5.0   Eos % %  --  3.2  --    Eosinophil % % 4.0  --  8.0   Basophil % %  --  1.1  --        Metabolic Panel (last 72 hours):  Recent Labs   Lab Result Units 07/15/25  0430 07/16/25  0423 07/17/25  0452   Sodium mmol/L 132* 131* 133*   Potassium mmol/L 4.6 4.5 4.3   Chloride mmol/L 97 98 99   CO2 mmol/L 28 29 30*   Glucose mg/dL 97 72 86   BUN mg/dL 11 10 17   Creatinine mg/dL 0.8 0.7 0.7   Albumin g/dL 3.1* 2.9* 2.9*   Bilirubin  Total mg/dL 1.1* 1.0 0.8   ALP unit/L 88 77 72   AST unit/L 19 18 18   ALT unit/L 13 12 11   Magnesium mg/dL 1.7 1.8 1.9       Vancomycin Administrations:  vancomycin given in the last 96 hours                     vancomycin 1,500 mg in 0.9% NaCl 250 mL IVPB (admixture device) (mg) 1,500 mg New Bag 07/17/25 0933     1,500 mg New Bag 07/16/25 2031     1,500 mg New Bag  1112     1,500 mg New Bag 07/15/25 2017    vancomycin (VANCOCIN) 2,000 mg in 0.9% NaCl 500 mL IVPB (admixture device) (mg) 2,000 mg New Bag 07/15/25 0953                    Microbiologic Results:  Microbiology Results (last 7 days)       Procedure Component Value Units Date/Time    Blood culture [[id number]]  (Normal) Collected: 07/15/25 1743    Order Status: Completed Specimen: Blood from Peripheral, Antecubital, Left Updated: 07/17/25 0700     CULTURE, BLOOD ([facility]) No Growth After 36 Hours    Blood culture x two cultures. Draw prior to antibiotics [[id number]]  (Normal) Collected: 07/11/25 1254    Order Status: Completed Specimen: Blood from Peripheral, Forearm, Left Updated: 07/16/25 1402     CULTURE, BLOOD ([facility]) No Growth After 5 Days    Blood culture x two cultures. Draw prior to antibiotics [[id number]]  (Normal) Collected: 07/11/25 1259    Order Status: Completed Specimen: Blood from Peripheral, Hand, Left Updated: 07/16/25 1402     CULTURE, BLOOD ([facility]) No Growth After 5 Days    Blood culture [[id number]]  (Normal) Collected: 07/14/25 0847    Order Status: Completed Specimen: Blood from Peripheral, Antecubital, Right Updated: 07/16/25 1002     CULTURE, BLOOD ([facility]) No Growth After 48 Hours    Culture, Respiratory with Gram Stain [[id number]]     Order Status: Sent Specimen: Respiratory from Sputum, Expectorated     MRSA Screen by PCR [[id number]]     Order Status: Sent Specimen: Nasal Swab     MRSA Screen by PCR [[id number]]     Order Status: Canceled Specimen: Nasal Swab     Influenza A & B by Molecular [[id number]]  (Normal) Collected: 07/11/25  1409    Order Status: Completed Specimen: Nasopharyngeal Swab Updated: 07/11/25 1435     INFLUENZA A MOLECULAR Negative     INFLUENZA B MOLECULAR  Negative    Blood culture x two cultures. Draw prior to antibiotics. [[id number]]     Order Status: Canceled Specimen: Blood     Blood culture x two cultures. Draw prior to antibiotics. [[id number]]     Order Status: Canceled Specimen: Blood

## 2025-07-17 NOTE — ASSESSMENT & PLAN NOTE
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 The patient's most recent sodium results are listed below.  Recent Labs     07/15/25  0430 07/16/25  0423 07/17/25  0452   * 131* 133*     Plan  - Correct the sodium by 4-6mEq in 24 hours.   - Monitor sodium Daily.   - Patient hyponatremia is worsening. Will continue current treatment

## 2025-07-17 NOTE — PLAN OF CARE
Problem: Physical Therapy  Goal: Physical Therapy Goal  Description: Goals to be met by: 25     Patient will increase functional independence with mobility by performin. Sit to stand transfer with Modified [place]  2. Gait  x 200 feet with Modified [place] using Single-point Cane.   3. Ascend/descend 3 stair with right Handrails Stand-by Assistance using Single-point Cane .     Outcome: Progressing

## 2025-07-17 NOTE — PLAN OF CARE
Problem: Occupational Therapy  Goal: Occupational Therapy Goal  Description: Goals to be met by: 8/15/25     Patient will increase functional independence with ADLs by performing:    UE Dressing with Supervision.  LE Dressing with Supervision.  Grooming while standing at sink with Supervision.  Toileting from toilet with Supervision for hygiene and clothing management.   Toilet transfer to toilet with Supervision.    Outcome: Progressing

## 2025-07-17 NOTE — RESPIRATORY THERAPY
07/17/25 1200   Patient Assessment/Suction   Level of Consciousness (AVPU) alert   Respiratory Effort Normal;Unlabored   Expansion/Accessory Muscles/Retractions no use of accessory muscles;no retractions;expansion symmetric   Rhythm/Pattern, Respiratory unlabored;pattern regular;depth regular;no shortness of breath reported   PRE-TX-O2   Device (Oxygen Therapy) room air   SpO2 100 %   Pulse 85   Resp 19   Education   $ Education 15 min;Oxygen

## 2025-07-17 NOTE — PLAN OF CARE
Problem: Infection  Goal: Absence of Infection Signs and Symptoms  Outcome: Progressing     Problem: Sepsis/Septic Shock  Goal: Blood Glucose Level Within Targeted Range  Outcome: Progressing     Problem: Sepsis/Septic Shock  Goal: Absence of Infection Signs and Symptoms  Outcome: Progressing     Problem: Sepsis/Septic Shock  Goal: Optimal Nutrition Intake  Outcome: Progressing     Problem: Pneumonia  Goal: Effective Oxygenation and Ventilation  Outcome: Progressing     Problem: Wound  Goal: Optimal Wound Healing  Outcome: Progressing     Problem: Wound  Goal: Skin Health and Integrity  Outcome: Progressing
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
## 2025-07-17 NOTE — PT/OT/SLP PROGRESS
Physical Therapy Treatment    Patient Name:  Stevie Cristina   MRN:  [id number]    Recommendations:     Discharge Recommendations: Low Intensity Therapy (Home Health PT)  Discharge Equipment Recommendations: cane, straight  Barriers to discharge: None    Assessment:     Stevie Cristina is a 70 y.o. male admitted with a medical diagnosis of Acute pneumonia.  He presents with the following impairments/functional limitations: impaired self care skills, gait instability, impaired functional mobility, decreased safety awareness, impaired cardiopulmonary response to activity, impaired skin .    Rehab Prognosis: Fair; patient would benefit from acute skilled PT services to address these deficits and reach maximum level of function.    Recent Surgery: * No surgery found *      Plan:     During this hospitalization, patient to be seen 5 x/week to address the identified rehab impairments via gait training, therapeutic activities, therapeutic exercises and progress toward the following goals:    Plan of Care Expires:  08/14/25    Subjective     Chief Complaint: None stated  Patient/Family Comments/goals: Return home.   Pain/Comfort:  Pain Rating 1: 0/10      Objective:     Communicated with RN prior to session.  Patient found ambulatory in room/servin with telemetry, peripheral IV upon PT entry to room.     General Precautions: Standard, fall  Orthopedic Precautions: N/A  Braces: N/A  Respiratory Status: Room air     Functional Mobility:  Bed Mobility:     Supine to Sit: supervision  Sit to Supine: supervision  Transfers:     Sit to Stand:  stand by assistance with no AD  Gait: 65 feet SBA no AD      AM-PAC 6 CLICK MOBILITY          Treatment & Education:  Pt was educated on the following: call light use, importance of OOB activity and functional mobility to negate the negative effects of prolonged bed rest during this hospitalization, safe transfers/ambulation and discharge planning recommendations/options.      Patient left on commode with all lines intact, call button in reach, and RN notified..    GOALS:   Multidisciplinary Problems       Physical Therapy Goals          Problem: Physical Therapy    Goal Priority Disciplines Outcome Interventions   Physical Therapy Goal     PT, PT/OT Progressing    Description: Goals to be met by: 25     Patient will increase functional independence with mobility by performin. Sit to stand transfer with Modified [place]  2. Gait  x 200 feet with Modified [place] using Single-point Cane.   3. Ascend/descend 3 stair with right Handrails Stand-by Assistance using Single-point Cane .                              Time Tracking:     PT Received On: 25  PT Start Time: 1158     PT Stop Time: 1209  PT Total Time (min): 11 min     Billable Minutes: Gait Training 11    Treatment Type: Treatment  PT/PTA: PTA     Number of PTA visits since last PT visit: 3     2025

## 2025-07-17 NOTE — SUBJECTIVE & OBJECTIVE
Interval History:  Patient seen and examined.  Patient has stopped having high spiking fevers since yesterday morning.  Feels better.  Plan of care discussed with the patient in detail.    Review of Systems  Objective:     Vital Signs (Most Recent):  Temp: 97.5 °F (36.4 °C) (07/17/25 1115)  Pulse: 84 (07/17/25 1202)  Resp: 19 (07/17/25 1200)  BP: (!) 102/58 (07/17/25 1202)  SpO2: 100 % (07/17/25 1200) Vital Signs (24h Range):  Temp:  [97.5 °F (36.4 °C)-100.1 °F (37.8 °C)] 97.5 °F (36.4 °C)  Pulse:  [66-85] 84  Resp:  [15-22] 19  SpO2:  [96 %-100 %] 100 %  BP: ()/(52-68) 102/58     Weight: 93.8 kg (206 lb 12.7 oz)  Body mass index is 27.28 kg/m².    Intake/Output Summary (Last 24 hours) at 7/17/2025 1236  Last data filed at 7/17/2025 1234  Gross per 24 hour   Intake 975.81 ml   Output 2400 ml   Net -1424.19 ml         Physical Exam  Vitals and nursing note reviewed.   Eyes:      Pupils: Pupils are equal, round, and reactive to light.   Neck:      Vascular: No JVD.   Cardiovascular:      Rate and Rhythm: Normal rate and regular rhythm.      Heart sounds: Normal heart sounds.   Pulmonary:      Effort: Pulmonary effort is normal.      Breath sounds: Normal breath sounds.   Abdominal:      General: Bowel sounds are normal. There is distension.      Palpations: Abdomen is soft.      Tenderness: There is no abdominal tenderness. There is no guarding or rebound.   Musculoskeletal:         General: No deformity.   Lymphadenopathy:      Cervical: No cervical adenopathy.   Skin:     Coloration: Skin is not pale.      Findings: No rash.   Neurological:      Mental Status: He is oriented to person, place, and time. Mental status is at baseline.               Significant Labs: All pertinent labs within the past 24 hours have been reviewed.    Significant Imaging: I have reviewed all pertinent imaging results/findings within the past 24 hours.   no complications no complications no complications

## 2025-07-17 NOTE — PROGRESS NOTES
[facility] Hospital Medicine  Progress Note    Patient Name: Stevie Cristina  MRN: [id number]  Patient Class: IP- Inpatient   Admission Date: 7/11/2025  Length of Stay: 5 days  Attending Physician: Sergey Gallardo MD  Primary Care Provider: Temitope, Primary Doctor        Subjective     Principal Problem:Acute pneumonia        HPI:  70 year old pt getting admitted with severe sepsis/pneumonia  Pt overall is a poor historian and was not confused when saw in ER  He said he started having Nausea/Vomiting/diarrhea to ER physician but denied this c/o to me  He said he only had fever/chills/cough  Imaging showed evidence of pneumonia   His troponin levels were elevated but denied chest pain  Pt will be admitted under observation status as per  instruction     Overview/Hospital Course:  70-year-old gentleman presents to the emergency department with complaints of fever, chills and dyspnea.  Imaging consistent with pneumonia patient is started on broad-spectrum antibiotics.  Patient noted to be in severe sepsis on presentation which quickly resolved after 1 day of antibiotics.  Patient quickly defervesced.Found to have drop in H/H with history of crohn's disease. He received 1 unti of PRBCs. No evidence of active bleeding. Still having low grade temps.     Interval History:  Patient seen and examined.  Patient has stopped having high spiking fevers since yesterday morning.  Feels better.  Plan of care discussed with the patient in detail.    Review of Systems  Objective:     Vital Signs (Most Recent):  Temp: 97.5 °F (36.4 °C) (07/17/25 1115)  Pulse: 84 (07/17/25 1202)  Resp: 19 (07/17/25 1200)  BP: (!) 102/58 (07/17/25 1202)  SpO2: 100 % (07/17/25 1200) Vital Signs (24h Range):  Temp:  [97.5 °F (36.4 °C)-100.1 °F (37.8 °C)] 97.5 °F (36.4 °C)  Pulse:  [66-85] 84  Resp:  [15-22] 19  SpO2:  [96 %-100 %] 100 %  BP: ()/(52-68) 102/58     Weight: 93.8 kg (206 lb 12.7 oz)  Body mass index is 27.28  kg/m².    Intake/Output Summary (Last 24 hours) at 7/17/2025 1236  Last data filed at 7/17/2025 1234  Gross per 24 hour   Intake 975.81 ml   Output 2400 ml   Net -1424.19 ml         Physical Exam  Vitals and nursing note reviewed.   Eyes:      Pupils: Pupils are equal, round, and reactive to light.   Neck:      Vascular: No JVD.   Cardiovascular:      Rate and Rhythm: Normal rate and regular rhythm.      Heart sounds: Normal heart sounds.   Pulmonary:      Effort: Pulmonary effort is normal.      Breath sounds: Normal breath sounds.   Abdominal:      General: Bowel sounds are normal. There is distension.      Palpations: Abdomen is soft.      Tenderness: There is no abdominal tenderness. There is no guarding or rebound.   Musculoskeletal:         General: No deformity.   Lymphadenopathy:      Cervical: No cervical adenopathy.   Skin:     Coloration: Skin is not pale.      Findings: No rash.   Neurological:      Mental Status: He is oriented to person, place, and time. Mental status is at baseline.               Significant Labs: All pertinent labs within the past 24 hours have been reviewed.    Significant Imaging: I have reviewed all pertinent imaging results/findings within the past 24 hours.      Assessment & Plan  Acute pneumonia  Patient has a diagnosis of pneumonia. The cause of the pneumonia is suspected to be bacterial in etiology but organism is not known. The pneumonia is worsening due to fever and hypoxia. The patient has the following signs/symptoms of pneumonia: persistent hypoxia , cough, and shortness of breath. The patient does have a current oxygen requirement and the patient does not have a home oxygen requirement. I have reviewed the pertinent imaging. The following cultures have been collected: Blood cultures and Sputum culture The culture results are listed below.     Current antimicrobial regimen consists of the antibiotics listed below. Will monitor patient closely and continue current  "treatment plan unchanged.    Antibiotics (From admission, onward)      Start     Stop Route Frequency Ordered    07/17/25 1100  meropenem injection 1 g         -- IV Every 8 hours (non-standard times) 07/17/25 0936    07/15/25 2200  vancomycin 1,500 mg in 0.9% NaCl 250 mL IVPB (admixture device)         -- IV Every 12 hours (non-standard times) 07/15/25 0900    07/15/25 2100  doxycycline capsule 100 mg         -- Oral 2 times daily 07/15/25 1955    07/15/25 0943  vancomycin - pharmacy to dose  (vancomycin IVPB (PEDS and ADULTS))        Placed in "And" Linked Group    -- IV pharmacy to manage frequency 07/15/25 0843          Escalated antibiotics on 07/15 secondary to clinical deterioration and worsened spiking fevers.  Infectious Disease also consulted.  Patient is doing better.  We will hold off on any further titration of antibiotics for now.    Crohn's disease  Noted, chronic Crohn disease.  No evidence of acute exacerbation currently.  Continue to monitor.    Open wound of left lower leg  Chronic issue for 2 years   Follow up outpatient wound care     Methadone dependence  Aware-continue home methadone dosing    Iron deficiency anemia  Anemia is likely due to Iron deficiency and chronic disease due to crohn's. Most recent hemoglobin and hematocrit are listed below.  Recent Labs     07/15/25  0430 07/16/25  0423 07/17/25  0452   HGB 9.0* 7.8* 8.0*   HCT 28.0* 25.1* 26.4*     Plan  - Monitor serial CBC: Daily  - Transfuse PRBC if patient becomes hemodynamically unstable, symptomatic or H/H drops below 7/21.  - Patient has received 1 units of PRBCs on 7/12/25  - Patient's anemia is currently stable  Hypomagnesemia  Patient has Abnormal Magnesium: hypomagnesemia. Will continue to monitor electrolytes closely. Will replace the affected electrolytes and repeat labs to be done after interventions completed. The patient's magnesium results have been reviewed and are listed below.  Recent Labs   Lab 07/17/25  0452   MG " 1.9      Hyponatremia  Hyponatremia is likely due to SIADH secondary to pneumonia. The patient's most recent sodium results are listed below.  Recent Labs     07/15/25  0430 07/16/25  0423 07/17/25  0452   * 131* 133*     Plan  - Correct the sodium by 4-6mEq in 24 hours.   - Monitor sodium Daily.   - Patient hyponatremia is worsening. Will continue current treatment  Pancytopenia  This patient is found to have pancytopenia, the likely etiology is cirrhosis and drug-related from Crohn's medicines, will monitor CBC Daily. Will transfuse red blood cells if the hemoglobin is <7g/dL (or <8 in the setting of ACS). Will transfuse platelets if platelet count is <10k. Hold DVT prophylaxis if platelets are <50k. The patient's hemoglobin, white blood cell count, and platelet count results have been reviewed and are listed below.  Recent Labs   Lab 07/17/25  0452   HGB 8.0*   WBC 1.03*   *   Hematology was consulted.  Will defer to them for further ongoing management of this disease process.    Cirrhosis  Patient with known Cirrhosis with Child's class C. Co-morbidities are present and inclusive of ascites, malnutrition, and anemia/pancytopenia.  MELD-Na score calculated; MELD 3.0: 8 at 1/19/2025  6:14 AM  MELD-Na: 7 at 1/19/2025  6:14 AM  Calculated from:  Serum Creatinine: 0.7 mg/dL (Using min of 1 mg/dL) at 1/19/2025  6:14 AM  Serum Sodium: 137 mmol/L at 1/19/2025  6:14 AM  Total Bilirubin: 0.9 mg/dL (Using min of 1 mg/dL) at 1/19/2025  6:14 AM  Serum Albumin: 2.8 g/dL at 1/19/2025  6:14 AM  INR(ratio): 1.1 at 1/17/2025  4:20 AM  Age at listing (hypothetical): 69 years  Sex: Male at 1/19/2025  6:14 AM      Continue chronic meds. Etiology likely Hepatitis. Will avoid any hepatotoxic meds, and monitor CBC/CMP/INR for synthetic function.     VTE Risk Mitigation (From admission, onward)           Ordered     enoxaparin injection 40 mg  Daily         07/11/25 2045     IP VTE HIGH RISK PATIENT  Once         07/11/25  1704     Place sequential compression device  Until discontinued         07/11/25 1704                    Discharge Planning   JUNE: 7/19/2025     Code Status: Full Code   Medical Readiness for Discharge Date:   Discharge Plan A: Home Health          Please place Justification for DME      Sergey Gallardo MD  Department of Hospital Medicine   [facility]

## 2025-07-17 NOTE — ASSESSMENT & PLAN NOTE
"Patient has a diagnosis of pneumonia. The cause of the pneumonia is suspected to be bacterial in etiology but organism is not known. The pneumonia is worsening due to fever and hypoxia. The patient has the following signs/symptoms of pneumonia: persistent hypoxia , cough, and shortness of breath. The patient does have a current oxygen requirement and the patient does not have a home oxygen requirement. I have reviewed the pertinent imaging. The following cultures have been collected: Blood cultures and Sputum culture The culture results are listed below.     Current antimicrobial regimen consists of the antibiotics listed below. Will monitor patient closely and continue current treatment plan unchanged.    Antibiotics (From admission, onward)      Start     Stop Route Frequency Ordered    07/17/25 1100  meropenem injection 1 g         -- IV Every 8 hours (non-standard times) 07/17/25 0936    07/15/25 2200  vancomycin 1,500 mg in 0.9% NaCl 250 mL IVPB (admixture device)         -- IV Every 12 hours (non-standard times) 07/15/25 0900    07/15/25 2100  doxycycline capsule 100 mg         -- Oral 2 times daily 07/15/25 1955    07/15/25 0943  vancomycin - pharmacy to dose  (vancomycin IVPB (PEDS and ADULTS))        Placed in "And" Linked Group    -- IV pharmacy to manage frequency 07/15/25 0843          Escalated antibiotics on 07/15 secondary to clinical deterioration and worsened spiking fevers.  Infectious Disease also consulted.  Patient is doing better.  We will hold off on any further titration of antibiotics for now.    "

## 2025-07-17 NOTE — ASSESSMENT & PLAN NOTE
Anemia is likely due to Iron deficiency and chronic disease due to crohn's. Most recent hemoglobin and hematocrit are listed below.  Recent Labs     07/15/25  0430 07/16/25  0423 07/17/25  0452   HGB 9.0* 7.8* 8.0*   HCT 28.0* 25.1* 26.4*     Plan  - Monitor serial CBC: Daily  - Transfuse PRBC if patient becomes hemodynamically unstable, symptomatic or H/H drops below 7/21.  - Patient has received 1 units of PRBCs on 7/12/25  - Patient's anemia is currently stable

## 2025-07-17 NOTE — ASSESSMENT & PLAN NOTE
This patient is found to have pancytopenia, the likely etiology is cirrhosis and drug-related from Crohn's medicines, will monitor CBC Daily. Will transfuse red blood cells if the hemoglobin is <7g/dL (or <8 in the setting of ACS). Will transfuse platelets if platelet count is <10k. Hold DVT prophylaxis if platelets are <50k. The patient's hemoglobin, white blood cell count, and platelet count results have been reviewed and are listed below.  Recent Labs   Lab 07/17/25  0452   HGB 8.0*   WBC 1.03*   *   Hematology was consulted.  Will defer to them for further ongoing management of this disease process.

## 2025-07-17 NOTE — ASSESSMENT & PLAN NOTE
Patient has Abnormal Magnesium: hypomagnesemia. Will continue to monitor electrolytes closely. Will replace the affected electrolytes and repeat labs to be done after interventions completed. The patient's magnesium results have been reviewed and are listed below.  Recent Labs   Lab 07/17/25  0452   MG 1.9

## 2025-07-18 ENCOUNTER — TELEPHONE (OUTPATIENT)
Facility: CLINIC | Age: 70
End: 2025-07-18
Payer: MEDICARE

## 2025-07-18 LAB
ABSOLUTE EOSINOPHIL (SMH): 0.14 K/UL
ABSOLUTE MONOCYTE (SMH): 0.23 K/UL (ref 0.3–1)
ABSOLUTE NEUTROPHIL COUNT (SMH): 0.5 K/UL (ref 1.8–7.7)
ALBUMIN SERPL-MCNC: 2.8 G/DL (ref 3.5–5.2)
ALP SERPL-CCNC: 86 UNIT/L (ref 55–135)
ALT SERPL-CCNC: 11 UNIT/L (ref 10–44)
ANION GAP (SMH): 4 MMOL/L (ref 8–16)
AST SERPL-CCNC: 22 UNIT/L (ref 10–40)
BASOPHILS # BLD AUTO: 0 K/UL
BASOPHILS NFR BLD AUTO: 0 %
BILIRUB SERPL-MCNC: 0.5 MG/DL (ref 0.1–1)
BUN SERPL-MCNC: 18 MG/DL (ref 8–23)
CALCIUM SERPL-MCNC: 7.8 MG/DL (ref 8.7–10.5)
CHLORIDE SERPL-SCNC: 102 MMOL/L (ref 95–110)
CO2 SERPL-SCNC: 29 MMOL/L (ref 23–29)
CREAT SERPL-MCNC: 0.6 MG/DL (ref 0.5–1.4)
ERYTHROCYTE [DISTWIDTH] IN BLOOD BY AUTOMATED COUNT: 18.6 % (ref 11.5–14.5)
GFR SERPLBLD CREATININE-BSD FMLA CKD-EPI: >60 ML/MIN/1.73/M2
GLUCOSE SERPL-MCNC: 90 MG/DL (ref 70–110)
HCT VFR BLD AUTO: 26.6 % (ref 40–54)
HGB BLD-MCNC: 7.9 GM/DL (ref 14–18)
IMM GRANULOCYTES # BLD AUTO: 0.01 K/UL (ref 0–0.04)
IMM GRANULOCYTES NFR BLD AUTO: 0.9 % (ref 0–0.5)
LACTATE SERPL-SCNC: 1.1 MMOL/L (ref 0.5–1.9)
LYMPHOCYTES # BLD AUTO: 0.31 K/UL (ref 1–4.8)
MAGNESIUM SERPL-MCNC: 1.8 MG/DL (ref 1.6–2.6)
MCH RBC QN AUTO: 24 PG (ref 27–31)
MCHC RBC AUTO-ENTMCNC: 29.7 G/DL (ref 32–36)
MCV RBC AUTO: 81 FL (ref 82–98)
MRSA PCR SCRN (SMH): NOT DETECTED
NUCLEATED RBC (/100WBC) (SMH): 0 /100 WBC
PLATELET # BLD AUTO: 110 K/UL (ref 150–450)
PMV BLD AUTO: ABNORMAL FL
POTASSIUM SERPL-SCNC: 4.1 MMOL/L (ref 3.5–5.1)
PROT SERPL-MCNC: 5.6 GM/DL (ref 6–8.4)
RBC # BLD AUTO: 3.29 M/UL (ref 4.6–6.2)
RELATIVE EOSINOPHIL (SMH): 12.1 % (ref 0–8)
RELATIVE LYMPHOCYTE (SMH): 26.7 % (ref 18–48)
RELATIVE MONOCYTE (SMH): 19.8 % (ref 4–15)
RELATIVE NEUTROPHIL (SMH): 40.5 % (ref 38–73)
SODIUM SERPL-SCNC: 135 MMOL/L (ref 136–145)
WBC # BLD AUTO: 1.16 K/UL (ref 3.9–12.7)

## 2025-07-18 PROCEDURE — 97116 GAIT TRAINING THERAPY: CPT | Mod: CQ

## 2025-07-18 PROCEDURE — 20600001 HC STEP DOWN PRIVATE ROOM

## 2025-07-18 PROCEDURE — 25000003 PHARM REV CODE 250: Performed by: INTERNAL MEDICINE

## 2025-07-18 PROCEDURE — 25000003 PHARM REV CODE 250: Performed by: HOSPITALIST

## 2025-07-18 PROCEDURE — 87641 MR-STAPH DNA AMP PROBE: CPT | Performed by: INTERNAL MEDICINE

## 2025-07-18 PROCEDURE — 63600175 PHARM REV CODE 636 W HCPCS: Performed by: INTERNAL MEDICINE

## 2025-07-18 PROCEDURE — 94761 N-INVAS EAR/PLS OXIMETRY MLT: CPT

## 2025-07-18 PROCEDURE — 36415 COLL VENOUS BLD VENIPUNCTURE: CPT | Performed by: INTERNAL MEDICINE

## 2025-07-18 PROCEDURE — 83605 ASSAY OF LACTIC ACID: CPT | Performed by: HOSPITALIST

## 2025-07-18 PROCEDURE — 25000003 PHARM REV CODE 250

## 2025-07-18 PROCEDURE — 80053 COMPREHEN METABOLIC PANEL: CPT | Performed by: INTERNAL MEDICINE

## 2025-07-18 PROCEDURE — 99233 SBSQ HOSP IP/OBS HIGH 50: CPT | Mod: ,,, | Performed by: INTERNAL MEDICINE

## 2025-07-18 PROCEDURE — 83735 ASSAY OF MAGNESIUM: CPT | Performed by: INTERNAL MEDICINE

## 2025-07-18 PROCEDURE — 85025 COMPLETE CBC W/AUTO DIFF WBC: CPT | Performed by: INTERNAL MEDICINE

## 2025-07-18 PROCEDURE — 97535 SELF CARE MNGMENT TRAINING: CPT

## 2025-07-18 PROCEDURE — 27000207 HC ISOLATION

## 2025-07-18 RX ADMIN — SENNOSIDES AND DOCUSATE SODIUM 1 TABLET: 50; 8.6 TABLET ORAL at 09:07

## 2025-07-18 RX ADMIN — ACETAMINOPHEN 650 MG: 325 TABLET ORAL at 09:07

## 2025-07-18 RX ADMIN — METHADONE HYDROCHLORIDE 110 MG: 10 TABLET ORAL at 09:07

## 2025-07-18 RX ADMIN — DOXYCYCLINE 100 MG: 100 CAPSULE ORAL at 08:07

## 2025-07-18 RX ADMIN — ENOXAPARIN SODIUM 40 MG: 40 INJECTION SUBCUTANEOUS at 05:07

## 2025-07-18 RX ADMIN — PANTOPRAZOLE SODIUM 40 MG: 40 TABLET, DELAYED RELEASE ORAL at 06:07

## 2025-07-18 RX ADMIN — Medication: at 09:07

## 2025-07-18 RX ADMIN — AMOXICILLIN AND CLAVULANATE POTASSIUM 1 TABLET: 875; 125 TABLET, FILM COATED ORAL at 09:07

## 2025-07-18 RX ADMIN — CYANOCOBALAMIN 1000 MCG: 1000 INJECTION INTRAMUSCULAR; SUBCUTANEOUS at 09:07

## 2025-07-18 RX ADMIN — ACETAMINOPHEN 650 MG: 325 TABLET ORAL at 08:07

## 2025-07-18 RX ADMIN — POLYETHYLENE GLYCOL 3350 17 G: 17 POWDER, FOR SOLUTION ORAL at 09:07

## 2025-07-18 RX ADMIN — Medication 6 MG: at 08:07

## 2025-07-18 RX ADMIN — DOXYCYCLINE 100 MG: 100 CAPSULE ORAL at 09:07

## 2025-07-18 RX ADMIN — AMOXICILLIN AND CLAVULANATE POTASSIUM 1 TABLET: 875; 125 TABLET, FILM COATED ORAL at 08:07
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
## 2025-07-18 NOTE — PT/OT/SLP PROGRESS
Occupational Therapy   Treatment    Name: Stevie Cristina  MRN: [id number]  Admitting Diagnosis:  Acute pneumonia       Recommendations:     Discharge Recommendations: Low Intensity Therapy  Discharge Equipment Recommendations:  none  Barriers to discharge:   (Increased assist with ADLs, IADLs, and mobility)    Assessment:     Stevie Cristina is a 70 y.o. male with a medical diagnosis of Acute pneumonia. Pt is agreeable to OT treatment session this AM. Performance deficits affecting function are weakness, impaired endurance, impaired self care skills, impaired functional mobility, gait instability, impaired balance, decreased upper extremity function, decreased lower extremity function, decreased safety awareness, impaired cardiopulmonary response to activity.     Rehab Prognosis:  Fair; patient would benefit from acute skilled OT services to address these deficits and reach maximum level of function.       Plan:     Patient to be seen 5 x/week to address the above listed problems via self-care/home management, therapeutic activities, therapeutic exercises  Plan of Care Expires: 08/14/25  Plan of Care Reviewed with: patient    Subjective     Chief Complaint: none stated  Patient/Family Comments/goals: none stated  Pain/Comfort:  Pain Rating 1: 0/10    Objective:     Communicated with: nursing prior to session.  Patient found HOB elevated with telemetry, bed alarm, pulse ox (continuous), blood pressure cuff upon OT entry to room.    General Precautions: Standard, fall, neutropenic    Orthopedic Precautions:N/A  Braces: N/A  Respiratory Status: Room air     Occupational Performance:     Bed Mobility:    Patient completed Supine to Sit with stand by assistance  Patient completed Sit to Supine with stand by assistance     Functional Mobility/Transfers:  Patient completed Sit <> Stand Transfer with stand by assistance  with  no assistive device   Patient completed Toilet Transfer Step Transfer technique with  stand by assistance with  no AD  Functional Mobility: Pt amb to bathroom with SBA and no AD with no LOB or SOB.    Activities of Daily Living:  Grooming: stand by assistance for g/h standing at sink  Lower Body Dressing: minimum assistance to don socks with sock aid; demonstration and assist needed for proper technique   Toileting: independence for hygiene and brief management after toileting per pt      Treatment & Education:  Pt educated on role of OT/POC, importance of OOB/EOB activity, use of call bell, and safety during ADLs, transfers, and functional mobility.    Patient left HOB elevated with all lines intact, call button in reach, and bed alarm on    GOALS:   Multidisciplinary Problems       Occupational Therapy Goals          Problem: Occupational Therapy    Goal Priority Disciplines Outcome Interventions   Occupational Therapy Goal     OT, PT/OT Progressing    Description: Goals to be met by: 8/15/25     Patient will increase functional independence with ADLs by performing:    UE Dressing with Supervision.  LE Dressing with Supervision.  Grooming while standing at sink with Supervision.  Toileting from toilet with Supervision for hygiene and clothing management.   Toilet transfer to toilet with Supervision.                         DME Justifications:  No DME recommended requiring DME justifications    Time Tracking:     OT Date of Treatment: 07/18/25  OT Start Time: 1049  OT Stop Time: 1115  OT Total Time (min): 26 min    Billable Minutes:Self Care/Home Management 26    OT/POONAM: OT          7/18/2025

## 2025-07-18 NOTE — PLAN OF CARE
Met with patient at bedside to discuss home health choices.  Patient declines home health services at this time.       07/18/25 1037   Discharge Plan   Discharge Plan A Home with family   Discharge Plan B Home with family

## 2025-07-18 NOTE — PLAN OF CARE
07/18/25 1627   Discharge Reassessment   Assessment Type Discharge Planning Reassessment   Did the patient's condition or plan change since previous assessment? No   Discharge Plan discussed with: Patient   Communicated JUNE with patient/caregiver Yes   Discharge Plan A Home   Discharge Plan B Home   DME Needed Upon Discharge  none   Transition of Care Barriers None   Why the patient remains in the hospital Requires continued medical care

## 2025-07-18 NOTE — PLAN OF CARE
Pt states another Dr told him that his B12 was low and he needs to have B12 therapy outpt due to Chrons     Pt had a B12 shot today    Pt has no spiked a temp in 24 hours.    Pending discharge tomorrow discussed       07/18/25 1020   Rounds   Attendance Provider;Nurse ;Assigned nurse   Discharge Plan A Home Health   Why the patient remains in the hospital Requires continued medical care   Transition of Care Barriers None

## 2025-07-18 NOTE — ASSESSMENT & PLAN NOTE
This patient is found to have pancytopenia, the likely etiology is cirrhosis and drug-related from Crohn's medicines, will monitor CBC Daily. Will transfuse red blood cells if the hemoglobin is <7g/dL (or <8 in the setting of ACS). Will transfuse platelets if platelet count is <10k. Hold DVT prophylaxis if platelets are <50k. The patient's hemoglobin, white blood cell count, and platelet count results have been reviewed and are listed below.  Recent Labs   Lab 07/18/25  0521   HGB 7.9*   WBC 1.16*   *   Hematology was consulted.  Will defer to them for further ongoing management of this disease process.    Noted to have acute vitamin B12 deficiency-ordered IV B12.

## 2025-07-18 NOTE — PROGRESS NOTES
[facility]  Inpatient Nutrition Assessment    Admit Date: 7/11/2025   Total duration of encounter: 7 days     Nutrition Recommendation/Prescription   1. Continue Regular diet as tolerated.   2. Continue Darron BID to assist with healing.   3.Honor food prefs and RD following.    Communication of RD recs:    Nutrition Discharge Planning: General healthy diet  Oral supplement regimen (comments): Darron 2 times / day fow wound healing.Darron 2 times / day fow wound healing.     NUTRITION ASSESSMENT    MEDICAL CHART REVIEW:  Patient Age: 70 y.o. male  Reason For Assessment: RD follow-up  Diagnosis: infection/sepsis  General Information Comments: Pt states his appetite and po intake are good. He is drinking Darron BID.  Nutrition Risk Screen  MST Score: 2  Have you recently lost weight without trying?: Unsure  Weight loss score: 2  Have you been eating poorly because of a decreased appetite?: No  Appetite score: 0    The primary encounter diagnosis was Altered mental status, unspecified altered mental status type. Diagnoses of Screening for cardiovascular condition, Leg pain, Left leg cellulitis, Fever, unspecified fever cause, Pneumonia due to infectious organism, unspecified laterality, unspecified part of lung, Severe sepsis, Chest pain, and Pneumonia were also pertinent to this visit.     Past Medical History:   Diagnosis Date    Colon polyp     Crohn disease     Hard of hearing     Hepatitis C     Hepatitis C     Hypertension     TB lung, latent          SOCIAL AND DIET HISTORY:  Nutrition/Diet History  Nutrition Intake History: -  Food Preferences: no milk, tea, cofffee. add coke with eat meal- communicated to dietary staff  Spiritual, Cultural Beliefs, Islam Practices, Values that Affect Care: no  Food Allergies: NKFA  Factors Affecting Nutritional Intake: None identified at this time  Nutrition-related SDOH: None Identified    Pertinent Labs Reviewed: reviewed    Recent Labs   Lab 07/11/25  6277  07/11/25  1300 07/12/25  0430 07/12/25  0659 07/13/25  0348 07/14/25  0358 07/15/25  0430 07/16/25  0423 07/17/25  0452 07/18/25  0521     --  135* 135* 135* 132* 132* 131* 133* 135*   K 3.8  --  3.8 3.7 3.8 4.4 4.6 4.5 4.3 4.1   CALCIUM 8.0*  --  7.4* 7.3* 7.5* 7.8* 8.1* 8.0* 8.0* 7.8*   PHOS 1.6*  --   --   --   --   --   --   --   --   --    MG 1.4*   < > 1.3*  --  1.5* 1.6 1.7 1.8 1.9 1.8     --  104 104 103 99 97 98 99 102   CO2 27  --  27 27 28 27 28 29 30* 29   BUN 10  --  10 10 13 14 11 10 17 18   CREATININE 0.7  --  0.7 0.7 0.7 0.6 0.8 0.7 0.7 0.6   EGFRNORACEVR >60  --  >60 >60 >60 >60 >60 >60 >60 >60   GLU 95  --  86 83 90 76 97 72 86 90   BILITOT 1.0  --  1.4*  --  1.4* 1.1* 1.1* 1.0 0.8 0.5   ALKPHOS 112  --  68  --  63 67 88 77 72 86   ALT 25  --  19  --  14 13 13 12 11 11   AST 69*  --  37  --  22 17 19 18 18 22   ALBUMIN 3.2*  --  2.6*  --  2.6* 2.8* 3.1* 2.9* 2.9* 2.8*    < > = values in this interval not displayed.       Pertinent Medications Reviewed: reviewed    Scheduled Meds:   amoxicillin-clavulanate 875-125mg  1 tablet Oral Q12H    cyanocobalamin  1,000 mcg Subcutaneous Daily    doxycycline  100 mg Oral BID    enoxparin  40 mg Subcutaneous Daily    methadone  110 mg Oral Daily    pantoprazole  40 mg Oral QAM    polyethylene glycol  17 g Oral Daily    senna-docusate  1 tablet Oral BID    white petrolatum   Topical (Top) Daily     Continuous Meds:     NUTRITION FOCUSED PHYSICAL EXAM (NFPE):     Skin (Micronutrient): wounds unhealed     Pt with intentional weight loss.    A minimum of two characteristics is recommended for diagnosis of either severe or non-severe malnutrition.  Wound(s):     Wound 07/11/25 1830 Traumatic Left anterior Leg-Wound Image: Images linked       Wound 07/11/25 Pressure Injury medial Sacral spine-Wound Image: Images linked       Wound 07/11/25 Traumatic Left distal;anterior;medial Leg #2-Wound Image: Images linked       Wound 07/11/25 Traumatic Left  "distal;anterior;lateral Leg #3-Wound Image: Images linked    Anthropometrics  Height: 6' 1" (185.4 cm)  Height (inches): 73 in Height Method: Stated   Weight: 93.8 kg (206 lb 12.7 oz) Weight (lb): 206.79 lb Weight Method: Bed Scale   Ideal Body Weight (IBW), Male: 184 lb % Ideal Body Weight, Male (lb): 112.39 %     BMI (Calculated): 27.3 BMI Grade: 25 - 29.9 - overweight                        Weight History:  Wt Readings from Last 5 Encounters:   07/11/25 93.8 kg (206 lb 12.7 oz)   06/01/25 90.7 kg (200 lb)   01/17/25 106.6 kg (235 lb 0.2 oz)   02/13/21 106.6 kg (235 lb)   11/19/19 108.9 kg (240 lb)     Weight Change(s) Since Admission: weight loss was intentional per Pt  Admit Weight: 90.7 kg (200 lb) (07/11/25 1243), Weight Method: Standard Scale    Diet order:   Diet Adult Regular  Dietary nutrition supplements By Mouth; BID; Darron - Any flavor   Oral Nutrition Supplement: Darron BID  ESTIMATED NUTRITION REQUIREMENTS:  Weight Used For Calorie Calculations: 93.8 kg (206 lb 12.7 oz)  Energy Calorie Requirements (kcal): 2177 kcal/day (23 kcal/kg)  Energy Need Method: Conway-St Jeor  Weight Used For Protein Calculations: 93.8 kg (206 lb 12.7 oz)  Protein Requirements: 75-94 gm/day (0.8-1.0 gm/kg).     CHO Requirement: n/a    Evaluation of Received Nutrient/Fluid Intake  % Intake of Estimated Energy Needs: 75 - 100 %  % Meal Intake: 75 - 100 %  Energy Calories Required: meeting needs  Protein Required: meeting needs  Fluid Required: meeting needs     Intake/Output Summary (Last 24 hours) at 7/18/2025 0941  Last data filed at 7/18/2025 0430  Gross per 24 hour   Intake 755.51 ml   Output 400 ml   Net 355.51 ml      Last Bowel Movement: 07/17/25    NUTRITION DIAGNOSIS   Increased nutrient needs related to Increased demand for protein energy as evidence by Wounds  Status: Continues    NUTRITION INTERVENTION    Nutrition Interventions: General healthful diet, Medical food supplement therapy    NUTRITION MONITORING AND " EVALUATION    Monitor and Evaluation: Beliefs and attitudes, Inflammatory profile, Food and beverage intake, Weight, Gastrointestinal profile, Electrolyte and renal panel   Nutrition Goals:   Goal #1: PO intake will meet greater than or equal to 75% of estimated needs by RD follow up   Nutrition Goal Status #1: goal met  Goal #2: Oral supplement consumption of greater than or equal to 75% of estimated needs by RD follow up  Nutrition Goal Status #2: goal met    Nutrition Risk  Level of Risk/Frequency of Follow-up: low (weekly)     Nutrition Follow-Up  RD Follow-up?: Yes    Please consult if re-assessment needed sooner.  Silvia Echevarria RD 07/18/2025 9:43 AM

## 2025-07-18 NOTE — PROGRESS NOTES
[facility] Medicine  Progress Note    Patient Name: Stevie Cristina  MRN: [id number]  Patient Class: IP- Inpatient   Admission Date: 7/11/2025  Length of Stay: 6 days  Attending Physician: Sergey Gallardo MD  Primary Care Provider: Temitope, Primary Doctor        Subjective     Principal Problem:Acute pneumonia        HPI:  70 year old pt getting admitted with severe sepsis/pneumonia  Pt overall is a poor historian and was not confused when saw in ER  He said he started having Nausea/Vomiting/diarrhea to ER physician but denied this c/o to me  He said he only had fever/chills/cough  Imaging showed evidence of pneumonia   His troponin levels were elevated but denied chest pain  Pt will be admitted under observation status as per  instruction     Overview/Hospital Course:  70-year-old gentleman presents to the emergency department with complaints of fever, chills and dyspnea.  Imaging consistent with pneumonia patient is started on broad-spectrum antibiotics.  Patient noted to be in severe sepsis on presentation which quickly resolved after 1 day of antibiotics.  Patient quickly defervesced.Found to have drop in H/H with history of crohn's disease. He received 1 unti of PRBCs. No evidence of active bleeding.  Patient had high spiking fevers from 7/14- 7/16.  Required broad-spectrum antibiotics.  Infectious Disease was consulted for management of fevers with absence of overall source.  Likely source thought to be acute pneumonia.    Interval History:  Patient seen and examined.  Patient has stopped having high spiking fevers since 7/16.  Feels better.  Plan of care discussed with the patient in detail.  Discussed with infectious disease.    Review of Systems  Objective:     Vital Signs (Most Recent):  Temp: 98.2 °F (36.8 °C) (07/18/25 1145)  Pulse: 73 (07/18/25 1000)  Resp: 16 (07/18/25 1000)  BP: (!) 117/58 (07/18/25 1000)  SpO2: 100 % (07/18/25 1000) Vital Signs (24h Range):  Temp:   [96.2 °F (35.7 °C)-98.7 °F (37.1 °C)] 98.2 °F (36.8 °C)  Pulse:  [69-78] 73  Resp:  [14-23] 16  SpO2:  [97 %-100 %] 100 %  BP: ()/(52-60) 117/58     Weight: 93.8 kg (206 lb 12.7 oz)  Body mass index is 27.28 kg/m².    Intake/Output Summary (Last 24 hours) at 7/18/2025 1319  Last data filed at 7/18/2025 0942  Gross per 24 hour   Intake 120 ml   Output 525 ml   Net -405 ml         Physical Exam  Vitals and nursing note reviewed.   Eyes:      Pupils: Pupils are equal, round, and reactive to light.   Neck:      Vascular: No JVD.   Cardiovascular:      Rate and Rhythm: Normal rate and regular rhythm.      Heart sounds: Normal heart sounds.   Pulmonary:      Effort: Pulmonary effort is normal.      Breath sounds: Normal breath sounds.   Abdominal:      General: Bowel sounds are normal. There is distension.      Palpations: Abdomen is soft.      Tenderness: There is no abdominal tenderness. There is no guarding or rebound.   Musculoskeletal:         General: No deformity.   Lymphadenopathy:      Cervical: No cervical adenopathy.   Skin:     Coloration: Skin is not pale.      Findings: No rash.   Neurological:      Mental Status: He is oriented to person, place, and time. Mental status is at baseline.               Significant Labs: All pertinent labs within the past 24 hours have been reviewed.    Significant Imaging: I have reviewed all pertinent imaging results/findings within the past 24 hours.      Assessment & Plan  Acute pneumonia  Patient has a diagnosis of pneumonia. The cause of the pneumonia is suspected to be bacterial in etiology but organism is not known. The pneumonia is worsening due to fever and hypoxia. The patient has the following signs/symptoms of pneumonia: persistent hypoxia , cough, and shortness of breath. The patient does have a current oxygen requirement and the patient does not have a home oxygen requirement. I have reviewed the pertinent imaging. The following cultures have been collected:  Blood cultures and Sputum culture The culture results are listed below.     Current antimicrobial regimen consists of the antibiotics listed below. Will monitor patient closely and continue current treatment plan unchanged.    Antibiotics (From admission, onward)      Start     Stop Route Frequency Ordered    07/17/25 2100  amoxicillin-clavulanate 875-125mg per tablet 1 tablet         -- Oral Every 12 hours 07/17/25 1719    07/15/25 2100  doxycycline capsule 100 mg         -- Oral 2 times daily 07/15/25 1955          Escalated antibiotics on 07/15 secondary to clinical deterioration and worsened spiking fevers.  Infectious Disease also consulted.  Patient is doing better.  If he remains afebrile by tomorrow, can discharge.    Crohn's disease  Noted, chronic Crohn disease.  No evidence of acute exacerbation currently.  Continue to monitor.    Open wound of left lower leg  Chronic issue for 2 years   Follow up outpatient wound care     Methadone dependence  Aware-continue home methadone dosing    Iron deficiency anemia  Anemia is likely due to Iron deficiency and chronic disease due to crohn's. Most recent hemoglobin and hematocrit are listed below.  Recent Labs     07/16/25  0423 07/17/25  0452 07/18/25  0521   HGB 7.8* 8.0* 7.9*   HCT 25.1* 26.4* 26.6*     Plan  - Monitor serial CBC: Daily  - Transfuse PRBC if patient becomes hemodynamically unstable, symptomatic or H/H drops below 7/21.  - Patient has received 1 units of PRBCs on 7/12/25  - Patient's anemia is currently stable  Hypomagnesemia  Patient has Abnormal Magnesium: hypomagnesemia. Will continue to monitor electrolytes closely. Will replace the affected electrolytes and repeat labs to be done after interventions completed. The patient's magnesium results have been reviewed and are listed below.  Recent Labs   Lab 07/18/25  0521   MG 1.8      Hyponatremia  Hyponatremia is likely due to SIADH secondary to pneumonia. The patient's most recent sodium results  are listed below.  Recent Labs     07/16/25  0423 07/17/25  0452 07/18/25  0521   * 133* 135*     Plan  - Correct the sodium by 4-6mEq in 24 hours.   - Monitor sodium Daily.   - Patient hyponatremia is worsening. Will continue current treatment  Pancytopenia  This patient is found to have pancytopenia, the likely etiology is cirrhosis and drug-related from Crohn's medicines, will monitor CBC Daily. Will transfuse red blood cells if the hemoglobin is <7g/dL (or <8 in the setting of ACS). Will transfuse platelets if platelet count is <10k. Hold DVT prophylaxis if platelets are <50k. The patient's hemoglobin, white blood cell count, and platelet count results have been reviewed and are listed below.  Recent Labs   Lab 07/18/25  0521   HGB 7.9*   WBC 1.16*   *   Hematology was consulted.  Will defer to them for further ongoing management of this disease process.    Noted to have acute vitamin B12 deficiency-ordered IV B12.    Cirrhosis  Patient with known Cirrhosis with Child's class C. Co-morbidities are present and inclusive of ascites, malnutrition, and anemia/pancytopenia.  MELD-Na score calculated; MELD 3.0: 8 at 1/19/2025  6:14 AM  MELD-Na: 7 at 1/19/2025  6:14 AM  Calculated from:  Serum Creatinine: 0.7 mg/dL (Using min of 1 mg/dL) at 1/19/2025  6:14 AM  Serum Sodium: 137 mmol/L at 1/19/2025  6:14 AM  Total Bilirubin: 0.9 mg/dL (Using min of 1 mg/dL) at 1/19/2025  6:14 AM  Serum Albumin: 2.8 g/dL at 1/19/2025  6:14 AM  INR(ratio): 1.1 at 1/17/2025  4:20 AM  Age at listing (hypothetical): 69 years  Sex: Male at 1/19/2025  6:14 AM      Continue chronic meds. Etiology likely Hepatitis. Will avoid any hepatotoxic meds, and monitor CBC/CMP/INR for synthetic function.     VTE Risk Mitigation (From admission, onward)           Ordered     enoxaparin injection 40 mg  Daily         07/11/25 2045     IP VTE HIGH RISK PATIENT  Once         07/11/25 1704     Place sequential compression device  Until  discontinued         07/11/25 1704                    Discharge Planning   JUNE: 7/19/2025     Code Status: Full Code   Medical Readiness for Discharge Date:   Discharge Plan A: Home with family            Please place Justification for DME      Sergey Gallardo MD  Department of Hospital Medicine   [facility]

## 2025-07-18 NOTE — PT/OT/SLP PROGRESS
Physical Therapy Treatment    Patient Name:  Stevie Cristina   MRN:  [id number]    Recommendations:     Discharge Recommendations: Low Intensity Therapy (Home Health PT)  Discharge Equipment Recommendations: cane, straight  Barriers to discharge: None    Assessment:     Stevie Cristina is a 70 y.o. male admitted with a medical diagnosis of Acute pneumonia.  He presents with the following impairments/functional limitations: impaired cardiopulmonary response to activity, decreased safety awareness, impaired skin .    Rehab Prognosis: Fair; patient would benefit from acute skilled PT services to address these deficits and reach maximum level of function.    Recent Surgery: * No surgery found *      Plan:     During this hospitalization, patient to be seen 5 x/week to address the identified rehab impairments via gait training, therapeutic activities, therapeutic exercises and progress toward the following goals:    Plan of Care Expires:  08/14/25    Subjective     Chief Complaint: None stated  Patient/Family Comments/goals: Return home  Pain/Comfort:  Pain Rating 1: 0/10      Objective:     Communicated with RN prior to session.  Patient found HOB elevated with telemetry, bed alarm, pulse ox (continuous), blood pressure cuff upon PT entry to room.     General Precautions: Standard, fall  Orthopedic Precautions: N/A  Braces: N/A  Respiratory Status: Room air     Functional Mobility:  Bed Mobility:     Supine to Sit: supervision  Sit to Supine: supervision  Transfers:     Sit to Stand:  supervision with no AD  Gait: 120 feet supervision no AD      AM-PAC 6 CLICK MOBILITY          Treatment & Education:  Pt was educated on the following: call light use, importance of OOB activity and functional mobility to negate the negative effects of prolonged bed rest during this hospitalization, safe transfers/ambulation and discharge planning recommendations/options.     Patient left HOB elevated with all lines intact, call  button in reach, and bed alarm on..    GOALS:   Multidisciplinary Problems       Physical Therapy Goals          Problem: Physical Therapy    Goal Priority Disciplines Outcome Interventions   Physical Therapy Goal     PT, PT/OT Progressing    Description: Goals to be met by: 25     Patient will increase functional independence with mobility by performin. Sit to stand transfer with Modified [place]  2. Gait  x 200 feet with Modified [place] using Single-point Cane.   3. Ascend/descend 3 stair with right Handrails Stand-by Assistance using Single-point Cane .                              Time Tracking:     PT Received On: 25  PT Start Time: 1429     PT Stop Time: 1443  PT Total Time (min): 14 min     Billable Minutes: Gait Training 14    Treatment Type: Treatment  PT/PTA: PTA     Number of PTA visits since last PT visit: 4     2025

## 2025-07-18 NOTE — SUBJECTIVE & OBJECTIVE
Interval History:  Patient seen and examined.  Patient has stopped having high spiking fevers since 7/16.  Feels better.  Plan of care discussed with the patient in detail.  Discussed with infectious disease.    Review of Systems  Objective:     Vital Signs (Most Recent):  Temp: 98.2 °F (36.8 °C) (07/18/25 1145)  Pulse: 73 (07/18/25 1000)  Resp: 16 (07/18/25 1000)  BP: (!) 117/58 (07/18/25 1000)  SpO2: 100 % (07/18/25 1000) Vital Signs (24h Range):  Temp:  [96.2 °F (35.7 °C)-98.7 °F (37.1 °C)] 98.2 °F (36.8 °C)  Pulse:  [69-78] 73  Resp:  [14-23] 16  SpO2:  [97 %-100 %] 100 %  BP: ()/(52-60) 117/58     Weight: 93.8 kg (206 lb 12.7 oz)  Body mass index is 27.28 kg/m².    Intake/Output Summary (Last 24 hours) at 7/18/2025 1319  Last data filed at 7/18/2025 0942  Gross per 24 hour   Intake 120 ml   Output 525 ml   Net -405 ml         Physical Exam  Vitals and nursing note reviewed.   Eyes:      Pupils: Pupils are equal, round, and reactive to light.   Neck:      Vascular: No JVD.   Cardiovascular:      Rate and Rhythm: Normal rate and regular rhythm.      Heart sounds: Normal heart sounds.   Pulmonary:      Effort: Pulmonary effort is normal.      Breath sounds: Normal breath sounds.   Abdominal:      General: Bowel sounds are normal. There is distension.      Palpations: Abdomen is soft.      Tenderness: There is no abdominal tenderness. There is no guarding or rebound.   Musculoskeletal:         General: No deformity.   Lymphadenopathy:      Cervical: No cervical adenopathy.   Skin:     Coloration: Skin is not pale.      Findings: No rash.   Neurological:      Mental Status: He is oriented to person, place, and time. Mental status is at baseline.               Significant Labs: All pertinent labs within the past 24 hours have been reviewed.    Significant Imaging: I have reviewed all pertinent imaging results/findings within the past 24 hours.

## 2025-07-18 NOTE — PROGRESS NOTES
07/18/25 1437   WOCN Assessment   WOCN Total Time (mins) 30   Visit Date 07/18/25   Visit Time 1235   Consult Type Follow Up   WOCN Speciality Wound   Wound pressure;skin tear   Intervention assessed;coordination of care   Teaching on-going   Skin Interventions   Device Skin Pressure Protection absorbent pad utilized/changed   Pressure Reduction Devices positioning supports utilized   Pressure Reduction Techniques heels elevated off bed   Skin Protection transparent dressing maintained   Positioning   Body Position position changed independently   Head of Bed (HOB) Positioning HOB elevated   Pressure Injury Prevention    Check Moisture Management Pad Done   Sacral Foam Dressing Remove   Heel protection technique Pillow   Check Medical Devices Done        Wound 07/11/25 1830 Traumatic Left anterior Leg   Date First Assessed/Time First Assessed: 07/11/25 1830   Present on Original Admission: Yes  Primary Wound Type: (c) Traumatic  Side: Left  Orientation: anterior  Location: Leg   Dressing Appearance Dry;Intact;Clean   Drainage Amount None   Drainage Characteristics/Odor No odor   Appearance Dressing in place, unable to visualize;Pink;Tan   Tissue loss description Full thickness   Red (%), Wound Tissue Color 50 %   Yellow (%), Wound Tissue Color 50 %   Periwound Area Intact;Rodriguez [place]via   Wound Edges Open   Care Cleansed with:;Antimicrobial agent   Dressing Changed;Gauze, wet to moist;Silicone;Foam  (Xeroform Mepilex)   Off Loading Pillow        Wound 07/11/25 Pressure Injury medial Sacral spine   Date First Assessed: 07/11/25   Present on Original Admission: Yes  Primary Wound Type: Pressure Injury  Orientation: medial  Location: Sacral spine   Wound Image    Pressure Injury Stage 2   Dressing Appearance Dry;Intact;Clean   Drainage Amount None   Drainage Characteristics/Odor No odor   Appearance Pink   Tissue loss description Partial thickness   Care Cleansed with:;Soap and water   Dressing Applied;Paste  ([employer])   Off  Loading Pillow        Wound 07/11/25 Traumatic Left distal;anterior;medial Leg #2   Date First Assessed: 07/11/25   Present on Original Admission: Yes  Primary Wound Type: Traumatic  Side: Left  Orientation: distal;anterior;medial  Location: Leg  Wound Number: #2   Wound Image    Dressing Appearance Dry;Intact;Clean   Drainage Amount None   Drainage Characteristics/Odor Serous   Appearance Pink;Red   Tissue loss description Full thickness   Red (%), Wound Tissue Color 100 %   Wound Edges Open   Care Cleansed with:;Antimicrobial agent   Dressing Changed;Gauze, wet to moist;Foam;Silicone  (xeroform-mepilex)   Off Loading Pillow        Wound 07/11/25 Traumatic Left distal;anterior;lateral Leg #3   Date First Assessed: 07/11/25   Present on Original Admission: Yes  Primary Wound Type: Traumatic  Side: Left  Orientation: distal;anterior;lateral  Location: Leg  Wound Number: #3   Wound Image    Dressing Appearance Dry;Intact;Clean   Drainage Amount None   Drainage Characteristics/Odor Serous   Appearance Pink   Tissue loss description Full thickness   Red (%), Wound Tissue Color 100 %   Wound Edges Open   Care Cleansed with:;Antimicrobial agent;Sterile normal saline   Dressing Changed;Foam;Gauze, wet to moist;Silicone;Other (comment)  (Xeroform and mepilex)   Off Loading Pillow     POC continued and reviewed with pt.  Pt able to reposition individually.  Aquaphor applied to lower legs and feet.

## 2025-07-18 NOTE — ASSESSMENT & PLAN NOTE
Anemia is likely due to Iron deficiency and chronic disease due to crohn's. Most recent hemoglobin and hematocrit are listed below.  Recent Labs     07/16/25  0423 07/17/25  0452 07/18/25  0521   HGB 7.8* 8.0* 7.9*   HCT 25.1* 26.4* 26.6*     Plan  - Monitor serial CBC: Daily  - Transfuse PRBC if patient becomes hemodynamically unstable, symptomatic or H/H drops below 7/21.  - Patient has received 1 units of PRBCs on 7/12/25  - Patient's anemia is currently stable

## 2025-07-18 NOTE — PLAN OF CARE
07/18/25 1627   Discharge Reassessment   Assessment Type Discharge Planning Reassessment   Did the patient's condition or plan change since previous assessment? No   Discharge Plan discussed with: Patient   Communicated JUNE with patient/caregiver Yes   Discharge Plan A Home with family   Discharge Plan B Home with family   DME Needed Upon Discharge  none   Transition of Care Barriers None   Why the patient remains in the hospital Requires continued medical care

## 2025-07-18 NOTE — ASSESSMENT & PLAN NOTE
Patient has a diagnosis of pneumonia. The cause of the pneumonia is suspected to be bacterial in etiology but organism is not known. The pneumonia is worsening due to fever and hypoxia. The patient has the following signs/symptoms of pneumonia: persistent hypoxia , cough, and shortness of breath. The patient does have a current oxygen requirement and the patient does not have a home oxygen requirement. I have reviewed the pertinent imaging. The following cultures have been collected: Blood cultures and Sputum culture The culture results are listed below.     Current antimicrobial regimen consists of the antibiotics listed below. Will monitor patient closely and continue current treatment plan unchanged.    Antibiotics (From admission, onward)      Start     Stop Route Frequency Ordered    07/17/25 2100  amoxicillin-clavulanate 875-125mg per tablet 1 tablet         -- Oral Every 12 hours 07/17/25 1719    07/15/25 2100  doxycycline capsule 100 mg         -- Oral 2 times daily 07/15/25 1955          Escalated antibiotics on 07/15 secondary to clinical deterioration and worsened spiking fevers.  Infectious Disease also consulted.  Patient is doing better.  If he remains afebrile by tomorrow, can discharge.

## 2025-07-18 NOTE — PLAN OF CARE
07/18/25 1036   Medicare Message   Important Message from Medicare regarding Discharge Appeal Rights Given to patient/caregiver;Explained to patient/caregiver;Signed/date by patient/caregiver   Date IMM was signed 07/18/25   Time IMM was signed 1023

## 2025-07-18 NOTE — ASSESSMENT & PLAN NOTE
Patient has Abnormal Magnesium: hypomagnesemia. Will continue to monitor electrolytes closely. Will replace the affected electrolytes and repeat labs to be done after interventions completed. The patient's magnesium results have been reviewed and are listed below.  Recent Labs   Lab 07/18/25  0521   MG 1.8

## 2025-07-18 NOTE — ASSESSMENT & PLAN NOTE
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 The patient's most recent sodium results are listed below.  Recent Labs     07/16/25  0423 07/17/25  0452 07/18/25  0521   * 133* 135*     Plan  - Correct the sodium by 4-6mEq in 24 hours.   - Monitor sodium Daily.   - Patient hyponatremia is worsening. Will continue current treatment
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
## 2025-07-19 VITALS
HEART RATE: 69 BPM | OXYGEN SATURATION: 100 % | RESPIRATION RATE: 17 BRPM | TEMPERATURE: 98 F | DIASTOLIC BLOOD PRESSURE: 69 MMHG | HEIGHT: 73 IN | SYSTOLIC BLOOD PRESSURE: 123 MMHG | WEIGHT: 206.81 LBS | BODY MASS INDEX: 27.41 KG/M2

## 2025-07-19 PROBLEM — S81.802A OPEN WOUND OF LEFT LOWER LEG: Status: RESOLVED | Noted: 2025-07-11 | Resolved: 2025-07-19

## 2025-07-19 PROBLEM — E87.1 HYPONATREMIA: Status: RESOLVED | Noted: 2025-07-14 | Resolved: 2025-07-19

## 2025-07-19 PROBLEM — J18.9 ACUTE PNEUMONIA: Status: RESOLVED | Noted: 2025-07-11 | Resolved: 2025-07-19

## 2025-07-19 PROBLEM — E83.42 HYPOMAGNESEMIA: Status: RESOLVED | Noted: 2025-07-14 | Resolved: 2025-07-19

## 2025-07-19 PROBLEM — D50.9 IRON DEFICIENCY ANEMIA: Status: RESOLVED | Noted: 2025-07-13 | Resolved: 2025-07-19

## 2025-07-19 LAB
ABSOLUTE EOSINOPHIL (SMH): 0.28 K/UL
ABSOLUTE MONOCYTE (SMH): 0.27 K/UL (ref 0.3–1)
ABSOLUTE NEUTROPHIL COUNT (SMH): 0.8 K/UL (ref 1.8–7.7)
ALBUMIN SERPL-MCNC: 2.8 G/DL (ref 3.5–5.2)
ALP SERPL-CCNC: 103 UNIT/L (ref 55–135)
ALT SERPL-CCNC: 11 UNIT/L (ref 10–44)
ANION GAP (SMH): 4 MMOL/L (ref 8–16)
AST SERPL-CCNC: 21 UNIT/L (ref 10–40)
BACTERIA BLD CULT: NORMAL
BASOPHILS # BLD AUTO: 0.01 K/UL
BASOPHILS NFR BLD AUTO: 0.5 %
BILIRUB SERPL-MCNC: 0.5 MG/DL (ref 0.1–1)
BUN SERPL-MCNC: 16 MG/DL (ref 8–23)
CALCIUM SERPL-MCNC: 8.2 MG/DL (ref 8.7–10.5)
CHLORIDE SERPL-SCNC: 103 MMOL/L (ref 95–110)
CO2 SERPL-SCNC: 30 MMOL/L (ref 23–29)
CREAT SERPL-MCNC: 0.8 MG/DL (ref 0.5–1.4)
ERYTHROCYTE [DISTWIDTH] IN BLOOD BY AUTOMATED COUNT: 18.7 % (ref 11.5–14.5)
GFR SERPLBLD CREATININE-BSD FMLA CKD-EPI: >60 ML/MIN/1.73/M2
GLUCOSE SERPL-MCNC: 85 MG/DL (ref 70–110)
HCT VFR BLD AUTO: 27.6 % (ref 40–54)
HGB BLD-MCNC: 8.2 GM/DL (ref 14–18)
IMM GRANULOCYTES # BLD AUTO: 0.01 K/UL (ref 0–0.04)
IMM GRANULOCYTES NFR BLD AUTO: 0.5 % (ref 0–0.5)
LYMPHOCYTES # BLD AUTO: 0.48 K/UL (ref 1–4.8)
MAGNESIUM SERPL-MCNC: 1.8 MG/DL (ref 1.6–2.6)
MCH RBC QN AUTO: 23.2 PG (ref 27–31)
MCHC RBC AUTO-ENTMCNC: 29.7 G/DL (ref 32–36)
MCV RBC AUTO: 78 FL (ref 82–98)
NUCLEATED RBC (/100WBC) (SMH): 0 /100 WBC
PLATELET # BLD AUTO: 120 K/UL (ref 150–450)
PMV BLD AUTO: ABNORMAL FL
POTASSIUM SERPL-SCNC: 5 MMOL/L (ref 3.5–5.1)
PROT SERPL-MCNC: 5.8 GM/DL (ref 6–8.4)
RBC # BLD AUTO: 3.54 M/UL (ref 4.6–6.2)
RELATIVE EOSINOPHIL (SMH): 15.2 % (ref 0–8)
RELATIVE LYMPHOCYTE (SMH): 26.1 % (ref 18–48)
RELATIVE MONOCYTE (SMH): 14.7 % (ref 4–15)
RELATIVE NEUTROPHIL (SMH): 43 % (ref 38–73)
SODIUM SERPL-SCNC: 137 MMOL/L (ref 136–145)
WBC # BLD AUTO: 1.84 K/UL (ref 3.9–12.7)

## 2025-07-19 PROCEDURE — 25000003 PHARM REV CODE 250: Performed by: STUDENT IN AN ORGANIZED HEALTH CARE EDUCATION/TRAINING PROGRAM

## 2025-07-19 PROCEDURE — 85025 COMPLETE CBC W/AUTO DIFF WBC: CPT | Performed by: INTERNAL MEDICINE

## 2025-07-19 PROCEDURE — 94761 N-INVAS EAR/PLS OXIMETRY MLT: CPT

## 2025-07-19 PROCEDURE — 36415 COLL VENOUS BLD VENIPUNCTURE: CPT | Performed by: INTERNAL MEDICINE

## 2025-07-19 PROCEDURE — 63600175 PHARM REV CODE 636 W HCPCS: Performed by: STUDENT IN AN ORGANIZED HEALTH CARE EDUCATION/TRAINING PROGRAM

## 2025-07-19 PROCEDURE — 80053 COMPREHEN METABOLIC PANEL: CPT | Performed by: INTERNAL MEDICINE

## 2025-07-19 PROCEDURE — 25000003 PHARM REV CODE 250

## 2025-07-19 PROCEDURE — 83735 ASSAY OF MAGNESIUM: CPT | Performed by: INTERNAL MEDICINE

## 2025-07-19 PROCEDURE — 63600175 PHARM REV CODE 636 W HCPCS: Performed by: INTERNAL MEDICINE

## 2025-07-19 PROCEDURE — 25000003 PHARM REV CODE 250: Performed by: INTERNAL MEDICINE

## 2025-07-19 PROCEDURE — 25000003 PHARM REV CODE 250: Performed by: HOSPITALIST

## 2025-07-19 RX ORDER — OXYCODONE AND ACETAMINOPHEN 10; 325 MG/1; MG/1
1 TABLET ORAL EVERY 8 HOURS PRN
Qty: 3 TABLET | Refills: 0 | Status: SHIPPED | OUTPATIENT
Start: 2025-07-19

## 2025-07-19 RX ORDER — CALCIUM CARBONATE 300MG(750)
400 TABLET,CHEWABLE ORAL DAILY
Qty: 90 TABLET | Refills: 0 | Status: SHIPPED | OUTPATIENT
Start: 2025-07-19 | End: 2025-07-19

## 2025-07-19 RX ORDER — THIAMINE HCL 100 MG
100 TABLET ORAL DAILY
Status: DISCONTINUED | OUTPATIENT
Start: 2025-07-19 | End: 2025-07-19 | Stop reason: HOSPADM

## 2025-07-19 RX ORDER — AMOXICILLIN AND CLAVULANATE POTASSIUM 875; 125 MG/1; MG/1
1 TABLET, FILM COATED ORAL EVERY 12 HOURS
Qty: 10 TABLET | Refills: 0 | Status: SHIPPED | OUTPATIENT
Start: 2025-07-19 | End: 2025-07-24

## 2025-07-19 RX ORDER — DOXYCYCLINE 100 MG/1
100 CAPSULE ORAL 2 TIMES DAILY
Qty: 10 CAPSULE | Refills: 0 | Status: SHIPPED | OUTPATIENT
Start: 2025-07-19 | End: 2025-07-24

## 2025-07-19 RX ORDER — LANOLIN ALCOHOL/MO/W.PET/CERES
100 CREAM (GRAM) TOPICAL DAILY
Qty: 90 TABLET | Refills: 1 | Status: SHIPPED | OUTPATIENT
Start: 2025-07-19 | End: 2025-07-19

## 2025-07-19 RX ORDER — OXYCODONE AND ACETAMINOPHEN 10; 325 MG/1; MG/1
1 TABLET ORAL EVERY 8 HOURS PRN
Qty: 3 TABLET | Refills: 0 | Status: SHIPPED | OUTPATIENT
Start: 2025-07-19 | End: 2025-07-19

## 2025-07-19 RX ORDER — CALCIUM CARBONATE 300MG(750)
400 TABLET,CHEWABLE ORAL DAILY
Qty: 90 TABLET | Refills: 0 | Status: SHIPPED | OUTPATIENT
Start: 2025-07-19

## 2025-07-19 RX ORDER — MAGNESIUM SULFATE HEPTAHYDRATE 40 MG/ML
2 INJECTION, SOLUTION INTRAVENOUS ONCE
Status: COMPLETED | OUTPATIENT
Start: 2025-07-19 | End: 2025-07-19

## 2025-07-19 RX ORDER — LANOLIN ALCOHOL/MO/W.PET/CERES
100 CREAM (GRAM) TOPICAL DAILY
Qty: 90 TABLET | Refills: 1 | Status: SHIPPED | OUTPATIENT
Start: 2025-07-19

## 2025-07-19 RX ORDER — AMOXICILLIN AND CLAVULANATE POTASSIUM 875; 125 MG/1; MG/1
1 TABLET, FILM COATED ORAL EVERY 12 HOURS
Qty: 10 TABLET | Refills: 0 | Status: SHIPPED | OUTPATIENT
Start: 2025-07-19 | End: 2025-07-19

## 2025-07-19 RX ORDER — DOXYCYCLINE 100 MG/1
100 CAPSULE ORAL 2 TIMES DAILY
Qty: 10 CAPSULE | Refills: 0 | Status: SHIPPED | OUTPATIENT
Start: 2025-07-19 | End: 2025-07-19

## 2025-07-19 RX ADMIN — MAGNESIUM SULFATE HEPTAHYDRATE 2 G: 40 INJECTION, SOLUTION INTRAVENOUS at 08:07

## 2025-07-19 RX ADMIN — Medication 100 MG: at 11:07

## 2025-07-19 RX ADMIN — POLYETHYLENE GLYCOL 3350 17 G: 17 POWDER, FOR SOLUTION ORAL at 08:07

## 2025-07-19 RX ADMIN — DOXYCYCLINE 100 MG: 100 CAPSULE ORAL at 08:07

## 2025-07-19 RX ADMIN — SENNOSIDES AND DOCUSATE SODIUM 1 TABLET: 50; 8.6 TABLET ORAL at 08:07

## 2025-07-19 RX ADMIN — AMOXICILLIN AND CLAVULANATE POTASSIUM 1 TABLET: 875; 125 TABLET, FILM COATED ORAL at 08:07

## 2025-07-19 RX ADMIN — CYANOCOBALAMIN 1000 MCG: 1000 INJECTION INTRAMUSCULAR; SUBCUTANEOUS at 08:07

## 2025-07-19 RX ADMIN — ACETAMINOPHEN 650 MG: 325 TABLET ORAL at 08:07

## 2025-07-19 RX ADMIN — PANTOPRAZOLE SODIUM 40 MG: 40 TABLET, DELAYED RELEASE ORAL at 05:07

## 2025-07-19 RX ADMIN — METHADONE HYDROCHLORIDE 110 MG: 10 TABLET ORAL at 08:07

## 2025-07-19 RX ADMIN — Medication: at 08:07

## 2025-07-19 NOTE — ASSESSMENT & PLAN NOTE
This patient is found to have pancytopenia, the likely etiology is cirrhosis and drug-related from Crohn's medicines, will monitor CBC Daily. Will transfuse red blood cells if the hemoglobin is <7g/dL (or <8 in the setting of ACS). Will transfuse platelets if platelet count is <10k. Hold DVT prophylaxis if platelets are <50k. The patient's hemoglobin, white blood cell count, and platelet count results have been reviewed and are listed below.  Recent Labs   Lab 07/19/25  0254   HGB 8.2*   WBC 1.84*   *   Hematology was consulted.  Will defer to them for further ongoing management of this disease process.    Noted to have acute vitamin B12 deficiency-ordered IV B12.

## 2025-07-19 NOTE — PROGRESS NOTES
DC instructions reviewed with pt, pt verbalizes understanding, copy given to pt. Pt escorted off unit via wheel chair with staff to cab waiting at front of hospital.

## 2025-07-19 NOTE — PLAN OF CARE
07/19/25 1120   Final Note   Assessment Type Final Discharge Note   Anticipated Discharge Disposition Home   Post-Acute Status   Discharge Delays None known at this time     Patient cleared for discharge from case management standpoint.    Chart and discharge orders reviewed.  Patient discharged with no further case management needs.    Pt will be transported by via cab or lyft     Review of all prior data   Bedside evaluation and exam

## 2025-07-19 NOTE — DISCHARGE SUMMARY
[facility]  Discharge Summary      Patient Name: Stevie Cristina  MRN: [id number]  ASHLEY: [id number]  Patient Class: IP- Inpatient  Admission Date: 7/11/2025  Hospital Length of Stay: 7 days  Discharge Date and Time: 7/19/2025 12:29 PM  Attending Physician: Temitope att. providers found   Discharging Provider: Aparna Martinez MD  Primary Care Provider: Temitope, Primary Doctor    Primary Care Team: Networked reference to record PCT     HPI:   70 year old pt getting admitted with severe sepsis/pneumonia  Pt overall is a poor historian and was not confused when saw in ER  He said he started having Nausea/Vomiting/diarrhea to ER physician but denied this c/o to me  He said he only had fever/chills/cough  Imaging showed evidence of pneumonia   His troponin levels were elevated but denied chest pain  Pt will be admitted under observation status as per  instruction     * No surgery found *      Hospital Course:   70-year-old gentleman presents to the emergency department with complaints of fever, chills and dyspnea.  Imaging consistent with pneumonia patient is started on broad-spectrum antibiotics.  Patient noted to be in severe sepsis on presentation which quickly resolved after 1 day of antibiotics.  Patient quickly defervesced.Found to have drop in H/H with history of crohn's disease. He received 1 unti of PRBCs. No evidence of active bleeding. ID consulted.  Infectious Disease recommended completing antibiotics for total of 10 days.  Patient's symptoms improve, discharge with instructions follow up outpatient with PCP, Hematology, Infectious Disease.    Physical Exam.   Cardiovascular:      Rate and Rhythm: Normal rate and regular rhythm.      Heart sounds: Normal heart sounds.   Pulmonary:      Effort: Pulmonary effort is normal.      Breath sounds: Normal breath sounds.      Goals of Care Treatment Preferences:  Code Status: Full Code         Consults:   Consults (From admission,  onward)          Status Ordering Provider     Inpatient consult to Infectious Diseases  Once        Provider:  Stevie Camarillo MD    Completed SADIQ TOVAR            Assessment & Plan  Crohn's disease  Noted, chronic Crohn disease.  No evidence of acute exacerbation currently.  Continue to monitor.    Open wound of left lower leg  Chronic issue for 2 years   Follow up outpatient wound care     Methadone dependence  Aware-continue home methadone dosing    Pancytopenia  This patient is found to have pancytopenia, the likely etiology is cirrhosis and drug-related from Crohn's medicines, will monitor CBC Daily. Will transfuse red blood cells if the hemoglobin is <7g/dL (or <8 in the setting of ACS). Will transfuse platelets if platelet count is <10k. Hold DVT prophylaxis if platelets are <50k. The patient's hemoglobin, white blood cell count, and platelet count results have been reviewed and are listed below.  Recent Labs   Lab 07/19/25  0254   HGB 8.2*   WBC 1.84*   *   Hematology was consulted.  Will defer to them for further ongoing management of this disease process.    Noted to have acute vitamin B12 deficiency-ordered IV B12.    Cirrhosis  Patient with known Cirrhosis with Child's class C. Co-morbidities are present and inclusive of ascites, malnutrition, and anemia/pancytopenia.  MELD-Na score calculated; MELD 3.0: 8 at 1/19/2025  6:14 AM  MELD-Na: 7 at 1/19/2025  6:14 AM  Calculated from:  Serum Creatinine: 0.7 mg/dL (Using min of 1 mg/dL) at 1/19/2025  6:14 AM  Serum Sodium: 137 mmol/L at 1/19/2025  6:14 AM  Total Bilirubin: 0.9 mg/dL (Using min of 1 mg/dL) at 1/19/2025  6:14 AM  Serum Albumin: 2.8 g/dL at 1/19/2025  6:14 AM  INR(ratio): 1.1 at 1/17/2025  4:20 AM  Age at listing (hypothetical): 69 years  Sex: Male at 1/19/2025  6:14 AM      Continue chronic meds. Etiology likely Hepatitis. Will avoid any hepatotoxic meds, and monitor CBC/CMP/INR for synthetic function.     Final Active Diagnoses:  "   Diagnosis Date Noted POA    Cirrhosis [K74.60] 07/15/2025 Yes    Pancytopenia [D61.818] 07/14/2025 Yes    Crohn's disease [K50.90] 07/11/2025 Yes    Open wound of left lower leg [S81.802A] 07/11/2025 Yes    Methadone dependence [F11.20] 07/11/2025 Yes      Problems Resolved During this Admission:    Diagnosis Date Noted Date Resolved POA    PRINCIPAL PROBLEM:  Acute pneumonia [J18.9] 07/11/2025 07/19/2025 Yes    Thrombocytopenia [D69.6] 07/15/2025 07/15/2025 Yes    Hypomagnesemia [E83.42] 07/14/2025 07/19/2025 Yes    Hyponatremia [E87.1] 07/14/2025 07/19/2025 Yes    Iron deficiency anemia [D50.9] 07/13/2025 07/19/2025 Yes    Severe sepsis [A41.9, R65.20] 07/11/2025 07/12/2025 Yes    NSTEMI (non-ST elevated myocardial infarction) [I21.4] 07/11/2025 07/12/2025 Yes       Discharged Condition: stable    Disposition: Home or Self Care    Follow Up:   Follow-up Information       [facility] Follow up.    Specialty: Wound Care  Contact information:  [address]  [place] [address]  [phone]             Lupe Rodríguez MD Follow up.    Specialty: Internal Medicine  Contact information:  [address]  [place] [address]  [phone]               Christian, Salima, NP-C Follow up.    Specialty: Hematology and Oncology  Contact information:  [address]  [place] [address]  [phone]               Stevie Camarillo MD Follow up in 1 month(s).    Specialty: Infectious Diseases  Contact information:  [address] [address]  [phone]                           Patient Instructions:      CANE FOR HOME USE     Order Specific Question Answer Comments   Type of Cane: Straight    Height: 6' 1" (1.854 m)    Weight: 93.8 kg (206 lb 12.7 oz)    Does patient have medical equipment at home? none    Length of need (1-99 months): 99    Please check all that apply: Patient is unable to safely ambulate without equipment.      Ambulatory referral/consult to " Outpatient Case Management   Referral Priority: Routine Referral Type: Consultation   Referral Reason: Specialty Services Required   Number of Visits Requested: 1     Diet Adult Regular     Notify your health care provider if you experience any of the following:  temperature >100.4     Notify your health care provider if you experience any of the following:  persistent nausea and vomiting or diarrhea     Notify your health care provider if you experience any of the following:  severe uncontrolled pain     Notify your health care provider if you experience any of the following:  redness, tenderness, or signs of infection (pain, swelling, redness, odor or green/yellow discharge around incision site)     Activity as tolerated       Significant Diagnostic Studies: Labs: CMP   Recent Labs   Lab 07/18/25  0521 07/19/25  0254   * 137   K 4.1 5.0    103   CO2 29 30*   GLU 90 85   BUN 18 16   CREATININE 0.6 0.8   CALCIUM 7.8* 8.2*   PROT 5.6* 5.8*   ALBUMIN 2.8* 2.8*   BILITOT 0.5 0.5   ALKPHOS 86 103   AST 22 21   ALT 11 11   ANIONGAP 4* 4*    and CBC   Recent Labs   Lab 07/18/25 0521 07/19/25  0254   WBC 1.16* 1.84*   HGB 7.9* 8.2*   HCT 26.6* 27.6*   * 120*       Pending Diagnostic Studies:       Procedure Component Value Units Date/Time    EXTRA TUBES [[id number]] Collected: 07/14/25 0847    Order Status: Sent Lab Status: In process Updated: 07/14/25 0855    Specimen: Blood, Venous     Narrative:      The following orders were created for panel order EXTRA TUBES.  Procedure                               Abnormality         Status                     ---------                               -----------         ------                     Lavender Top Hold[[id number]]                               In process                 Gold Top Hold[[id number]]                                   In process                   Please view results for these tests on the individual orders.    EXTRA TUBES [[id number]]  Collected: 07/11/25 1256    Order Status: Sent Lab Status: In process Updated: 07/11/25 1316    Specimen: Blood, Venous     Narrative:      The following orders were created for panel order EXTRA TUBES.  Procedure                               Abnormality         Status                     ---------                               -----------         ------                     Light Blue Top Hold[[id number]]                             In process                 Gold Top Hold[[id number]]                                   In process                   Please view results for these tests on the individual orders.    Erythropoietin [[id number]] Collected: 07/17/25 0452    Order Status: Sent Lab Status: In process Updated: 07/17/25 0500    Specimen: Blood     Vitamin B1 [[id number]] Collected: 07/17/25 0452    Order Status: Sent Lab Status: In process Updated: 07/17/25 0501    Specimen: Blood     Vitamin B6 [[id number]] Collected: 07/17/25 0452    Order Status: Sent Lab Status: In process Updated: 07/17/25 0500    Specimen: Blood            Medications:  Reconciled Home Medications:      Medication List        START taking these medications      amoxicillin-clavulanate 875-125mg 875-125 mg per tablet  Commonly known as: AUGMENTIN  Take 1 tablet by mouth every 12 (twelve) hours. for 5 days     doxycycline 100 MG Cap  Commonly known as: VIBRAMYCIN  Take 1 capsule (100 mg total) by mouth 2 (two) times a day. for 5 days     magnesium oxide 400 mg magnesium Tab  Take 400 mg by mouth once daily.     oxyCODONE-acetaminophen  mg per tablet  Commonly known as: PERCOCET  Take 1 tablet by mouth every 8 (eight) hours as needed for Pain.     thiamine 100 MG tablet  Take 1 tablet (100 mg total) by mouth once daily.            CONTINUE taking these medications      METHADONE ORAL  Take 1 tablet by mouth once daily.              Indwelling Lines/Drains at time of discharge:   Lines/Drains/Airways       None                        Time spent on the discharge of patient: 35 minutes         Aparna Martinez MD  Department of Hospital Medicine  [facility]

## 2025-07-19 NOTE — PLAN OF CARE
Problem: Infection  Goal: Absence of Infection Signs and Symptoms  Outcome: Met     Problem: Adult Inpatient Plan of Care  Goal: Plan of Care Review  Outcome: Met  Goal: Patient-Specific Goal (Individualized)  Outcome: Met  Goal: Absence of Hospital-Acquired Illness or Injury  Outcome: Met  Goal: Optimal Comfort and Wellbeing  Outcome: Met  Goal: Readiness for Transition of Care  Outcome: Met     Problem: Sepsis/Septic Shock  Goal: Optimal Coping  Outcome: Met  Goal: Absence of Bleeding  Outcome: Met  Goal: Blood Glucose Level Within Targeted Range  Outcome: Met  Goal: Absence of Infection Signs and Symptoms  Outcome: Met  Goal: Optimal Nutrition Intake  Outcome: Met     Problem: Pneumonia  Goal: Fluid Balance  Outcome: Met  Goal: Resolution of Infection Signs and Symptoms  Outcome: Met  Goal: Effective Oxygenation and Ventilation  Outcome: Met     Problem: Wound  Goal: Optimal Coping  Outcome: Met  Goal: Optimal Functional Ability  Outcome: Met  Goal: Absence of Infection Signs and Symptoms  Outcome: Met  Goal: Improved Oral Intake  Outcome: Met  Goal: Optimal Pain Control and Function  Outcome: Met  Goal: Skin Health and Integrity  Outcome: Met  Goal: Optimal Wound Healing  Outcome: Met

## 2025-07-19 NOTE — CARE UPDATE
07/18/25 2020   Patient Assessment/Suction   Level of Consciousness (AVPU) alert   Respiratory Effort Normal;Unlabored   Expansion/Accessory Muscles/Retractions no use of accessory muscles;expansion symmetric;no retractions   All Lung Fields Breath Sounds Anterior:;Lateral:;clear   Rhythm/Pattern, Respiratory unlabored;pattern regular   Cough Frequency no cough   PRE-TX-O2   Device (Oxygen Therapy) room air   SpO2 100 %   Pulse Oximetry Type Continuous   $ Pulse Oximetry - Multiple Charge Pulse Oximetry - Multiple   Pulse 75   Resp 18

## 2025-07-20 LAB
BACTERIA BLD CULT: NORMAL
EPO SERPL-ACNC: 44.3 MIU/ML (ref 2.6–18.5)

## 2025-07-21 LAB — PYRIDOXAL PHOS SERPL-MCNC: 2.5 UG/L (ref 3.4–65.2)

## 2025-07-22 ENCOUNTER — PATIENT OUTREACH (OUTPATIENT)
Dept: ADMINISTRATIVE | Facility: CLINIC | Age: 70
End: 2025-07-22
Payer: MEDICARE

## 2025-07-22 LAB — VIT B1 BLD-SCNC: 74.7 NMOL/L (ref 66.5–200)

## 2025-07-22 NOTE — PROGRESS NOTES
C3 nurse attempted to contact Stevie Cristina for a TCC post hospital discharge follow up call. No answer. LVM requesting a callback at [phone].    The patient does not have a scheduled HOSFU appointment. No PCP listed.

## 2025-07-23 NOTE — PROGRESS NOTES
C3 nurse attempted to contact Stevie Cristina for a TCC post hospital discharge follow up call. No answer. LVM requesting a callback at [phone].    The patient does not have a scheduled HOSFU appointment. No PCP listed.

## 2025-07-23 NOTE — PROGRESS NOTES
C3 nurse attempted to contact Stevie Cristina for a TCC post hospital discharge follow up call. No answer, Reached no VM.    The patient does not have a scheduled HOSFU appointment. No PCP listed.

## 2025-07-28 ENCOUNTER — TELEPHONE (OUTPATIENT)
Facility: CLINIC | Age: 70
End: 2025-07-28
Payer: MEDICARE

## 2025-07-28 NOTE — TELEPHONE ENCOUNTER
Labs received and reviewed with Dr Gonzalez and per his verbal order, patient to get B12 injections weekly x 4, then monthly, and to recheck labs with a CBC on Wednesday or Thursday. Attempted to call patient's wife with above, no answer. Unable to leave voicemail.    Notified patient, faxed referral

## 2025-07-31 NOTE — PHYSICIAN QUERY
Please clarify altered mental status associated with the clinical findings.  Metabolic encephalopathy

## 2025-08-06 ENCOUNTER — PATIENT OUTREACH (OUTPATIENT)
Dept: ADMINISTRATIVE | Facility: OTHER | Age: 70
End: 2025-08-06
Payer: MEDICARE

## 2025-08-06 NOTE — PROGRESS NOTES
CHW - Outreach Attempt    Community Health Worker left a voicemail message for 1st attempt to contact patient regardinst attempt. Unable to lvm.   Community Health Worker to attempt to contact patient on:   25